# Patient Record
Sex: FEMALE | Race: WHITE | NOT HISPANIC OR LATINO | Employment: OTHER | URBAN - METROPOLITAN AREA
[De-identification: names, ages, dates, MRNs, and addresses within clinical notes are randomized per-mention and may not be internally consistent; named-entity substitution may affect disease eponyms.]

---

## 2017-04-04 DIAGNOSIS — Z76.0 ENCOUNTER FOR MEDICATION REFILL: ICD-10-CM

## 2017-04-04 RX ORDER — GABAPENTIN 300 MG/1
CAPSULE ORAL
Qty: 90 CAPSULE | Refills: 0 | Status: SHIPPED | OUTPATIENT
Start: 2017-04-04 | End: 2017-06-26

## 2017-05-01 DIAGNOSIS — Z76.0 ENCOUNTER FOR MEDICATION REFILL: ICD-10-CM

## 2017-05-01 RX ORDER — DILTIAZEM HYDROCHLORIDE 240 MG/1
CAPSULE, EXTENDED RELEASE ORAL
Qty: 90 CAPSULE | Refills: 0 | Status: SHIPPED | OUTPATIENT
Start: 2017-05-01 | End: 2022-07-29

## 2017-05-31 ENCOUNTER — TRANSFERRED RECORDS (OUTPATIENT)
Dept: FAMILY MEDICINE | Facility: CLINIC | Age: 42
End: 2017-05-31

## 2017-06-26 DIAGNOSIS — Z76.0 ENCOUNTER FOR MEDICATION REFILL: ICD-10-CM

## 2017-06-26 RX ORDER — TRAMADOL HYDROCHLORIDE 50 MG/1
TABLET ORAL
Qty: 40 TABLET | Refills: 0 | Status: SHIPPED | OUTPATIENT
Start: 2017-06-26 | End: 2022-07-29

## 2017-06-26 RX ORDER — GABAPENTIN 300 MG/1
CAPSULE ORAL
Qty: 90 CAPSULE | Refills: 0 | Status: SHIPPED | OUTPATIENT
Start: 2017-06-26 | End: 2022-07-29

## 2017-06-26 RX ORDER — ZOLPIDEM TARTRATE 5 MG/1
TABLET ORAL
Qty: 30 TABLET | Refills: 0 | Status: SHIPPED | OUTPATIENT
Start: 2017-06-26 | End: 2022-08-02

## 2017-11-09 ENCOUNTER — TRANSFERRED RECORDS (OUTPATIENT)
Dept: FAMILY MEDICINE | Facility: CLINIC | Age: 42
End: 2017-11-09

## 2018-07-01 ENCOUNTER — HEALTH MAINTENANCE LETTER (OUTPATIENT)
Age: 43
End: 2018-07-01

## 2019-07-25 ENCOUNTER — TRANSFERRED RECORDS (OUTPATIENT)
Dept: MULTI SPECIALTY CLINIC | Facility: CLINIC | Age: 44
End: 2019-07-25

## 2019-07-25 LAB
HPV ABSTRACT: NORMAL
PAP-ABSTRACT: NORMAL

## 2019-09-17 ENCOUNTER — RECORDS - HEALTHEAST (OUTPATIENT)
Dept: ADMINISTRATIVE | Facility: OTHER | Age: 44
End: 2019-09-17

## 2019-09-19 ENCOUNTER — RECORDS - HEALTHEAST (OUTPATIENT)
Dept: ADMINISTRATIVE | Facility: OTHER | Age: 44
End: 2019-09-19

## 2019-09-24 ENCOUNTER — RECORDS - HEALTHEAST (OUTPATIENT)
Dept: ADMINISTRATIVE | Facility: OTHER | Age: 44
End: 2019-09-24

## 2019-09-26 ASSESSMENT — MIFFLIN-ST. JEOR: SCORE: 2176.82

## 2019-09-27 ASSESSMENT — MIFFLIN-ST. JEOR: SCORE: 2176.66

## 2019-10-03 ENCOUNTER — ANESTHESIA - HEALTHEAST (OUTPATIENT)
Dept: SURGERY | Facility: CLINIC | Age: 44
End: 2019-10-03

## 2019-10-03 ENCOUNTER — SURGERY - HEALTHEAST (OUTPATIENT)
Dept: SURGERY | Facility: CLINIC | Age: 44
End: 2019-10-03

## 2019-10-03 ASSESSMENT — MIFFLIN-ST. JEOR: SCORE: 2167.87

## 2019-11-03 ENCOUNTER — HEALTH MAINTENANCE LETTER (OUTPATIENT)
Age: 44
End: 2019-11-03

## 2019-11-12 ENCOUNTER — RECORDS - HEALTHEAST (OUTPATIENT)
Dept: ADMINISTRATIVE | Facility: OTHER | Age: 44
End: 2019-11-12

## 2019-12-02 ASSESSMENT — MIFFLIN-ST. JEOR: SCORE: 2172.69

## 2019-12-05 ENCOUNTER — SURGERY - HEALTHEAST (OUTPATIENT)
Dept: SURGERY | Facility: CLINIC | Age: 44
End: 2019-12-05

## 2019-12-05 ENCOUNTER — ANESTHESIA - HEALTHEAST (OUTPATIENT)
Dept: SURGERY | Facility: CLINIC | Age: 44
End: 2019-12-05

## 2019-12-05 ASSESSMENT — MIFFLIN-ST. JEOR: SCORE: 2156.36

## 2019-12-20 ENCOUNTER — RECORDS - HEALTHEAST (OUTPATIENT)
Dept: ADMINISTRATIVE | Facility: OTHER | Age: 44
End: 2019-12-20

## 2019-12-23 ENCOUNTER — RECORDS - HEALTHEAST (OUTPATIENT)
Dept: ADMINISTRATIVE | Facility: OTHER | Age: 44
End: 2019-12-23

## 2020-02-10 ENCOUNTER — HEALTH MAINTENANCE LETTER (OUTPATIENT)
Age: 45
End: 2020-02-10

## 2020-11-16 ENCOUNTER — HEALTH MAINTENANCE LETTER (OUTPATIENT)
Age: 45
End: 2020-11-16

## 2021-06-02 NOTE — ANESTHESIA PROCEDURE NOTES
Peripheral Block    Patient location during procedure: pre-op  Start time: 10/3/2019 3:00 PM  End time: 10/3/2019 3:22 PM  post-op analgesia per surgeon order as noted in medical record  Staffing:  Performing  Anesthesiologist: Arleth Lizarraga MD  Preanesthetic Checklist  Completed: patient identified, site marked, risks, benefits, and alternatives discussed, timeout performed, consent obtained, airway assessed, oxygen available, suction available, emergency drugs available and hand hygiene performed  Peripheral Block  Block type: sciatic, popliteal  Prep: ChloraPrep  Patient position: supine  Patient monitoring: cardiac monitor, continuous pulse oximetry, heart rate and blood pressure  Laterality: left  Injection technique: ultrasound guided    Ultrasound used to visualize needle placement in proximity to nerve being blocked: yes   Permanent ultrasound image captured for medical record  Sterile gel and probe cover used for ultrasound.    Needle  Needle type: Stimuplex   Needle gauge: 22 G  Needle length: 6 in  no peripheral nerve catheter placed  Assessment  Injection assessment: no difficulty with injection, negative aspiration for heme and incremental injection

## 2021-06-02 NOTE — ANESTHESIA PREPROCEDURE EVALUATION
Anesthesia Evaluation      Patient summary reviewed   No history of anesthetic complications     Airway   Mallampati: II   Pulmonary - normal exam   (+) sleep apnea,                          Cardiovascular   Exercise tolerance: > or = 4 METS  Rhythm: regular  Rate: normal,         Neuro/Psych    (+) neuromuscular disease,      Endo/Other    (+) hypothyroidism, obesity,      GI/Hepatic/Renal            Dental                         Anesthesia Plan  Planned anesthetic: general endotracheal and peripheral nerve block    ASA 3   Induction: intravenous   Anesthetic plan and risks discussed with: patient    Post-op plan: routine recovery

## 2021-06-02 NOTE — ANESTHESIA POSTPROCEDURE EVALUATION
Patient: Estephania Wong  LEFT ANKLE ACHILLES TENDON DEBRIDEMENT INSERTIONAL ACHILLES TENDINOPATHY DEBRIDEMENT YULIA EXCISION GASTROC RECESSION, LEFT ANKLE HARDWARE REMOVAL  Anesthesia type: general    Patient location: Phase II Recovery  Last vitals:   Vitals Value Taken Time   /64 10/3/2019  7:30 PM   Temp 36.5  C (97.7  F) 10/3/2019  5:36 PM   Pulse 100 10/3/2019  7:33 PM   Resp 16 10/3/2019  6:25 PM   SpO2 100 % 10/3/2019  7:33 PM   Vitals shown include unvalidated device data.  Post vital signs: stable  Level of consciousness: awake and responds to simple questions  Post-anesthesia pain: pain controlled  Post-anesthesia nausea and vomiting: no  Pulmonary: unassisted, return to baseline  Cardiovascular: stable and blood pressure at baseline  Hydration: adequate  Anesthetic events: no    QCDR Measures:  ASA# 11 - Esha-op Cardiac Arrest: ASA11B - Patient did NOT experience unanticipated cardiac arrest  ASA# 12 - Esha-op Mortality Rate: ASA12B - Patient did NOT die  ASA# 13 - PACU Re-Intubation Rate: ASA13B - Patient did NOT require a new airway mgmt  ASA# 10 - Composite Anes Safety: ASA10A - No serious adverse event    Additional Notes: doing well after additional treatment for nausea; no pain due to good block

## 2021-06-02 NOTE — ANESTHESIA POSTPROCEDURE EVALUATION
Patient: Estephania Wong  LEFT ANKLE ACHILLES TENDON DEBRIDEMENT INSERTIONAL ACHILLES TENDINOPATHY DEBRIDEMENT YULIA EXCISION GASTROC RECESSION, LEFT ANKLE HARDWARE REMOVAL  Anesthesia type: general    Patient location: PACU  Last vitals:   Vitals Value Taken Time   /67 10/3/2019  6:10 PM   Temp 36.5  C (97.7  F) 10/3/2019  5:36 PM   Pulse 86 10/3/2019  6:14 PM   Resp 20 10/3/2019  6:14 PM   SpO2 99 % 10/3/2019  6:14 PM   Vitals shown include unvalidated device data.  Post vital signs: stable  Level of consciousness: awake and responds to simple questions  Post-anesthesia pain: pain controlled  Post-anesthesia nausea and vomiting: no  Pulmonary: unassisted, return to baseline  Cardiovascular: stable and blood pressure at baseline  Hydration: adequate  Anesthetic events: no    QCDR Measures:  ASA# 11 - Esha-op Cardiac Arrest: ASA11B - Patient did NOT experience unanticipated cardiac arrest  ASA# 12 - Esha-op Mortality Rate: ASA12B - Patient did NOT die  ASA# 13 - PACU Re-Intubation Rate: NA - No ETT / LMA used for case  ASA# 10 - Composite Anes Safety: ASA10A - No serious adverse event    Additional Notes:  CPAP overnight

## 2021-06-02 NOTE — ANESTHESIA PROCEDURE NOTES
Peripheral Block    Patient location during procedure: pre-op  Start time: 10/3/2019 3:22 PM  End time: 10/3/2019 3:26 PM  post-op analgesia per surgeon order as noted in medical record  Staffing:  Performing  Anesthesiologist: Arleth Lizarraga MD  Preanesthetic Checklist  Completed: patient identified, site marked, risks, benefits, and alternatives discussed, timeout performed, consent obtained, airway assessed, oxygen available, suction available, emergency drugs available and hand hygiene performed  Peripheral Block  Block type: saphenous, adductor canal block  Prep: ChloraPrep  Patient position: supine  Patient monitoring: cardiac monitor, continuous pulse oximetry, heart rate and blood pressure  Laterality: left  Injection technique: ultrasound guided    Ultrasound used to visualize needle placement in proximity to nerve being blocked: yes   Permanent ultrasound image captured for medical record  Sterile gel and probe cover used for ultrasound.    Needle  Needle type: Stimuplex   Needle gauge: 20G  Needle length: 6 in  no peripheral nerve catheter placed  Assessment  Injection assessment: no difficulty with injection, negative aspiration for heme, no paresthesia on injection and incremental injection

## 2021-06-02 NOTE — ANESTHESIA CARE TRANSFER NOTE
Last vitals:   Vitals:    10/03/19 1736   BP: (!) 144/95   Pulse: (!) 112   Resp: 20   Temp: 36.5  C (97.7  F)   SpO2: 100%     Patient's level of consciousness is awake  Spontaneous respirations: yes  Maintains airway independently: yes  Dentition unchanged: yes  Oropharynx: oropharynx clear of all foreign objects    QCDR Measures:  ASA# 20 - Surgical Safety Checklist: WHO surgical safety checklist completed prior to induction    PQRS# 430 - Adult PONV Prevention: 4558F - Pt received => 2 anti-emetic agents (different classes) preop & intraop  ASA# 8 - Peds PONV Prevention: NA - Not pediatric patient, not GA or 2 or more risk factors NOT present  PQRS# 424 - Esha-op Temp Management: 4559F - At least one body temp DOCUMENTED => 35.5C or 95.9F within required timeframe  PQRS# 426 - PACU Transfer Protocol: - Transfer of care checklist used  ASA# 14 - Acute Post-op Pain: ASA14B - Patient did NOT experience pain >= 7 out of 10

## 2021-06-03 VITALS — HEIGHT: 66 IN | BODY MASS INDEX: 47.09 KG/M2 | WEIGHT: 293 LBS

## 2021-06-03 VITALS — BODY MASS INDEX: 47.09 KG/M2 | WEIGHT: 293 LBS | HEIGHT: 66 IN

## 2021-06-04 NOTE — ANESTHESIA CARE TRANSFER NOTE
Last vitals:   Vitals:    12/05/19 1701   BP: 145/74   Pulse: (!) 116   Resp: 12   Temp: 36  C (96.8  F)   SpO2: 100%     Patient's level of consciousness is drowsy  Spontaneous respirations: yes  Maintains airway independently: yes  Dentition unchanged: yes  Oropharynx: oropharynx clear of all foreign objects    QCDR Measures:  ASA# 20 - Surgical Safety Checklist: WHO surgical safety checklist completed prior to induction    PQRS# 430 - Adult PONV Prevention: 4558F - Pt received => 2 anti-emetic agents (different classes) preop & intraop  ASA# 8 - Peds PONV Prevention: NA - Not pediatric patient, not GA or 2 or more risk factors NOT present  PQRS# 424 - Esha-op Temp Management: 4559F - At least one body temp DOCUMENTED => 35.5C or 95.9F within required timeframe  PQRS# 426 - PACU Transfer Protocol: - Transfer of care checklist used  ASA# 14 - Acute Post-op Pain: ASA14B - Patient did NOT experience pain >= 7 out of 10

## 2021-06-04 NOTE — ANESTHESIA POSTPROCEDURE EVALUATION
Patient: Estephania Wong  RIGHT HAGLUNDS RESECTION,ACHILLES REPAIR,, GASTROCNEMIUS RECESSIN  Anesthesia type: general    Patient location: PACU  Last vitals:   Vitals Value Taken Time   /57 12/5/2019  5:30 PM   Temp 36.1  C (96.9  F) 12/5/2019  5:20 PM   Pulse 60 12/5/2019  5:32 PM   Resp 13 12/5/2019  5:32 PM   SpO2 95 % 12/5/2019  5:32 PM   Vitals shown include unvalidated device data.  Post vital signs: stable  Level of consciousness: awake and responds to simple questions  Post-anesthesia pain: pain controlled  Post-anesthesia nausea and vomiting: no  Pulmonary: unassisted, return to baseline  Cardiovascular: stable and blood pressure at baseline  Hydration: adequate  Anesthetic events: no    QCDR Measures:  ASA# 11 - Esha-op Cardiac Arrest: ASA11B - Patient did NOT experience unanticipated cardiac arrest  ASA# 12 - Esha-op Mortality Rate: ASA12B - Patient did NOT die  ASA# 13 - PACU Re-Intubation Rate: ASA13B - Patient did NOT require a new airway mgmt  ASA# 10 - Composite Anes Safety: ASA10A - No serious adverse event    Additional Notes:

## 2021-06-04 NOTE — ANESTHESIA PREPROCEDURE EVALUATION
Anesthesia Evaluation      Patient summary reviewed   No history of anesthetic complications     Airway   Mallampati: II   Pulmonary - normal exam   (+) sleep apnea,                          Cardiovascular   Exercise tolerance: > or = 4 METS  Rhythm: regular  Rate: normal,         Neuro/Psych    (+) neuromuscular disease,      Endo/Other    (+) hypothyroidism, obesity,      GI/Hepatic/Renal            Dental                           Anesthesia Plan  Planned anesthetic: general endotracheal and peripheral nerve block  popliteal block for POP per surgeon request.  ASA 3   Induction: intravenous   Anesthetic plan and risks discussed with: patient    Post-op plan: routine recovery

## 2021-06-04 NOTE — ANESTHESIA PROCEDURE NOTES
Peripheral Block    Patient location during procedure: pre-op  Start time: 12/5/2019 2:41 PM  End time: 12/5/2019 2:58 PM  post-op analgesia per surgeon order as noted in medical record  Staffing:  Performing  Anesthesiologist: Arleth Lizarraga MD  Preanesthetic Checklist  Completed: patient identified, site marked, risks, benefits, and alternatives discussed, timeout performed, consent obtained, at patient's request, airway assessed, oxygen available, suction available, emergency drugs available and hand hygiene performed  Peripheral Block  Block type: sciatic, popliteal  Prep: ChloraPrep  Patient position: supine  Patient monitoring: cardiac monitor, continuous pulse oximetry, heart rate and blood pressure  Laterality: right  Injection technique: ultrasound guided    Ultrasound used to visualize needle placement in proximity to nerve being blocked: yes   US used to visualize anesthetic spread  Visualized anatomic structures normal  No Pathological Findings  Permanent ultrasound image captured for medical record  Sterile gel and probe cover used for ultrasound.  Needle  Needle type: Stimuplex   Needle gauge: 20G  Needle length: 6 in  no peripheral nerve catheter placed  Assessment  Injection assessment: no difficulty with injection, negative aspiration for heme, no paresthesia on injection and incremental injection  Additional Notes  Difficult to visualize popliteal nerve, technically difficult block.

## 2021-09-18 ENCOUNTER — HEALTH MAINTENANCE LETTER (OUTPATIENT)
Age: 46
End: 2021-09-18

## 2022-01-08 ENCOUNTER — HEALTH MAINTENANCE LETTER (OUTPATIENT)
Age: 47
End: 2022-01-08

## 2022-05-13 ENCOUNTER — TRANSFERRED RECORDS (OUTPATIENT)
Dept: HEALTH INFORMATION MANAGEMENT | Facility: CLINIC | Age: 47
End: 2022-05-13
Payer: COMMERCIAL

## 2022-07-28 ASSESSMENT — ENCOUNTER SYMPTOMS
DIZZINESS: 0
EYE PAIN: 0
CHILLS: 0
FREQUENCY: 0
NAUSEA: 0
DYSURIA: 0
PALPITATIONS: 0
HEMATOCHEZIA: 0
HEARTBURN: 1
ABDOMINAL PAIN: 0
PARESTHESIAS: 0
SHORTNESS OF BREATH: 0
BREAST MASS: 0
ARTHRALGIAS: 1
COUGH: 0
JOINT SWELLING: 0
HEMATURIA: 0
WEAKNESS: 0
DIARRHEA: 0
NERVOUS/ANXIOUS: 1
FEVER: 0
CONSTIPATION: 0
MYALGIAS: 0
SORE THROAT: 0
HEADACHES: 1

## 2022-07-29 ENCOUNTER — OFFICE VISIT (OUTPATIENT)
Dept: FAMILY MEDICINE | Facility: CLINIC | Age: 47
End: 2022-07-29
Payer: COMMERCIAL

## 2022-07-29 ENCOUNTER — MYC MEDICAL ADVICE (OUTPATIENT)
Dept: FAMILY MEDICINE | Facility: CLINIC | Age: 47
End: 2022-07-29

## 2022-07-29 VITALS
HEIGHT: 66 IN | WEIGHT: 293 LBS | BODY MASS INDEX: 47.09 KG/M2 | HEART RATE: 60 BPM | SYSTOLIC BLOOD PRESSURE: 139 MMHG | DIASTOLIC BLOOD PRESSURE: 85 MMHG

## 2022-07-29 DIAGNOSIS — Z13.220 SCREENING FOR HYPERLIPIDEMIA: ICD-10-CM

## 2022-07-29 DIAGNOSIS — Z12.31 VISIT FOR SCREENING MAMMOGRAM: ICD-10-CM

## 2022-07-29 DIAGNOSIS — R79.89 ELEVATED FERRITIN: ICD-10-CM

## 2022-07-29 DIAGNOSIS — E03.8 SUBCLINICAL HYPOTHYROIDISM: ICD-10-CM

## 2022-07-29 DIAGNOSIS — G25.81 RESTLESS LEGS SYNDROME (RLS): ICD-10-CM

## 2022-07-29 DIAGNOSIS — G47.00 INSOMNIA, UNSPECIFIED TYPE: ICD-10-CM

## 2022-07-29 DIAGNOSIS — Z12.11 SCREEN FOR COLON CANCER: ICD-10-CM

## 2022-07-29 DIAGNOSIS — Z86.2 HISTORY OF ANEMIA: ICD-10-CM

## 2022-07-29 DIAGNOSIS — K76.9 LIVER LESION: ICD-10-CM

## 2022-07-29 DIAGNOSIS — Z76.0 ENCOUNTER FOR MEDICATION REFILL: ICD-10-CM

## 2022-07-29 DIAGNOSIS — E66.01 MORBID OBESITY (H): ICD-10-CM

## 2022-07-29 DIAGNOSIS — Z00.00 ENCOUNTER FOR MEDICAL EXAMINATION TO ESTABLISH CARE: ICD-10-CM

## 2022-07-29 DIAGNOSIS — Z00.00 ANNUAL PHYSICAL EXAM: Primary | ICD-10-CM

## 2022-07-29 DIAGNOSIS — Z79.899 CONTROLLED SUBSTANCE AGREEMENT SIGNED: ICD-10-CM

## 2022-07-29 DIAGNOSIS — G47.00 INSOMNIA, UNSPECIFIED TYPE: Primary | ICD-10-CM

## 2022-07-29 PROBLEM — M54.6 PAIN IN THORACIC SPINE: Status: ACTIVE | Noted: 2018-10-01

## 2022-07-29 PROBLEM — Z97.5 IUD (INTRAUTERINE DEVICE) IN PLACE: Status: ACTIVE | Noted: 2022-07-29

## 2022-07-29 PROBLEM — H02.33 REDUNDANT EYELID OF BOTH EYES: Status: ACTIVE | Noted: 2021-10-27

## 2022-07-29 PROBLEM — D64.9 ANEMIA: Status: ACTIVE | Noted: 2019-09-24

## 2022-07-29 PROBLEM — M25.569 KNEE PAIN: Status: ACTIVE | Noted: 2021-03-01

## 2022-07-29 PROBLEM — D64.9 ANEMIA: Status: RESOLVED | Noted: 2019-09-24 | Resolved: 2022-07-29

## 2022-07-29 PROBLEM — I10 HYPERTENSION: Status: ACTIVE | Noted: 2019-09-24

## 2022-07-29 PROBLEM — R51.9 HEADACHE, CHRONIC DAILY: Status: ACTIVE | Noted: 2019-09-17

## 2022-07-29 PROBLEM — M25.519 PAIN IN SHOULDER: Status: ACTIVE | Noted: 2021-10-01

## 2022-07-29 PROBLEM — G43.909 MIGRAINE HEADACHE: Status: ACTIVE | Noted: 2018-10-01

## 2022-07-29 PROBLEM — H02.36 REDUNDANT EYELID OF BOTH EYES: Status: ACTIVE | Noted: 2021-10-27

## 2022-07-29 PROBLEM — M79.2 NEUROPATHIC PAIN: Status: ACTIVE | Noted: 2019-11-12

## 2022-07-29 LAB
ALBUMIN SERPL BCG-MCNC: 4.2 G/DL (ref 3.5–5.2)
ALP SERPL-CCNC: 59 U/L (ref 35–104)
ALT SERPL W P-5'-P-CCNC: 23 U/L (ref 10–35)
ANION GAP SERPL CALCULATED.3IONS-SCNC: 14 MMOL/L (ref 7–15)
AST SERPL W P-5'-P-CCNC: 16 U/L (ref 10–35)
BILIRUB SERPL-MCNC: 0.4 MG/DL
BUN SERPL-MCNC: 18.7 MG/DL (ref 6–20)
CALCIUM SERPL-MCNC: 9.6 MG/DL (ref 8.6–10)
CHLORIDE SERPL-SCNC: 100 MMOL/L (ref 98–107)
CHOLEST SERPL-MCNC: 195 MG/DL
CREAT SERPL-MCNC: 0.85 MG/DL (ref 0.51–0.95)
DEPRECATED HCO3 PLAS-SCNC: 24 MMOL/L (ref 22–29)
ERYTHROCYTE [DISTWIDTH] IN BLOOD BY AUTOMATED COUNT: 15.7 % (ref 10–15)
FERRITIN SERPL-MCNC: 732 NG/ML (ref 6–175)
GFR SERPL CREATININE-BSD FRML MDRD: 86 ML/MIN/1.73M2
GLUCOSE SERPL-MCNC: 99 MG/DL (ref 70–99)
HBA1C MFR BLD: 6.2 % (ref 0–5.6)
HCT VFR BLD AUTO: 40.8 % (ref 35–47)
HDLC SERPL-MCNC: 46 MG/DL
HGB BLD-MCNC: 13.4 G/DL (ref 11.7–15.7)
LDLC SERPL CALC-MCNC: 119 MG/DL
MCH RBC QN AUTO: 30 PG (ref 26.5–33)
MCHC RBC AUTO-ENTMCNC: 32.8 G/DL (ref 31.5–36.5)
MCV RBC AUTO: 92 FL (ref 78–100)
NONHDLC SERPL-MCNC: 149 MG/DL
PLATELET # BLD AUTO: 329 10E3/UL (ref 150–450)
POTASSIUM SERPL-SCNC: 3.8 MMOL/L (ref 3.4–5.3)
PROT SERPL-MCNC: 7.3 G/DL (ref 6.4–8.3)
RBC # BLD AUTO: 4.46 10E6/UL (ref 3.8–5.2)
SODIUM SERPL-SCNC: 138 MMOL/L (ref 136–145)
T4 FREE SERPL-MCNC: 1.08 NG/DL (ref 0.9–1.7)
TRIGL SERPL-MCNC: 149 MG/DL
TSH SERPL DL<=0.005 MIU/L-ACNC: 4.23 UIU/ML (ref 0.3–4.2)
WBC # BLD AUTO: 12 10E3/UL (ref 4–11)

## 2022-07-29 PROCEDURE — 80053 COMPREHEN METABOLIC PANEL: CPT | Performed by: FAMILY MEDICINE

## 2022-07-29 PROCEDURE — 99214 OFFICE O/P EST MOD 30 MIN: CPT | Mod: 25 | Performed by: FAMILY MEDICINE

## 2022-07-29 PROCEDURE — 83550 IRON BINDING TEST: CPT | Performed by: FAMILY MEDICINE

## 2022-07-29 PROCEDURE — 84443 ASSAY THYROID STIM HORMONE: CPT | Performed by: FAMILY MEDICINE

## 2022-07-29 PROCEDURE — 82728 ASSAY OF FERRITIN: CPT | Performed by: FAMILY MEDICINE

## 2022-07-29 PROCEDURE — 36415 COLL VENOUS BLD VENIPUNCTURE: CPT | Performed by: FAMILY MEDICINE

## 2022-07-29 PROCEDURE — 80061 LIPID PANEL: CPT | Performed by: FAMILY MEDICINE

## 2022-07-29 PROCEDURE — 99386 PREV VISIT NEW AGE 40-64: CPT | Performed by: FAMILY MEDICINE

## 2022-07-29 PROCEDURE — 83540 ASSAY OF IRON: CPT | Performed by: FAMILY MEDICINE

## 2022-07-29 PROCEDURE — 85027 COMPLETE CBC AUTOMATED: CPT | Performed by: FAMILY MEDICINE

## 2022-07-29 PROCEDURE — 83036 HEMOGLOBIN GLYCOSYLATED A1C: CPT | Performed by: FAMILY MEDICINE

## 2022-07-29 PROCEDURE — 84439 ASSAY OF FREE THYROXINE: CPT | Performed by: FAMILY MEDICINE

## 2022-07-29 RX ORDER — CLONAZEPAM 1 MG/1
1 TABLET ORAL AT BEDTIME
Qty: 30 TABLET | Refills: 0 | Status: SHIPPED | OUTPATIENT
Start: 2022-07-29 | End: 2022-08-29

## 2022-07-29 RX ORDER — METHOCARBAMOL 500 MG/1
TABLET, FILM COATED ORAL
COMMUNITY
Start: 2022-07-26 | End: 2022-08-24

## 2022-07-29 RX ORDER — HYDROCHLOROTHIAZIDE 50 MG/1
50 TABLET ORAL DAILY
COMMUNITY
Start: 2022-06-10 | End: 2023-03-24

## 2022-07-29 RX ORDER — CELECOXIB 100 MG/1
100 CAPSULE ORAL 2 TIMES DAILY
Status: ON HOLD | COMMUNITY
Start: 2022-06-24 | End: 2022-10-06 | Stop reason: SINTOL

## 2022-07-29 RX ORDER — BUPROPION HYDROCHLORIDE 150 MG/1
150 TABLET ORAL EVERY MORNING
Qty: 90 TABLET | Refills: 3 | Status: SHIPPED | OUTPATIENT
Start: 2022-07-29 | End: 2023-07-10

## 2022-07-29 RX ORDER — PREGABALIN 200 MG/1
200 CAPSULE ORAL AT BEDTIME
COMMUNITY
Start: 2022-07-20

## 2022-07-29 RX ORDER — NORTRIPTYLINE HCL 10 MG
20 CAPSULE ORAL AT BEDTIME
COMMUNITY
Start: 2022-07-08

## 2022-07-29 RX ORDER — MULTIPLE VITAMINS W/ MINERALS TAB 9MG-400MCG
1 TAB ORAL DAILY
COMMUNITY

## 2022-07-29 RX ORDER — FLUTICASONE PROPIONATE 50 MCG
2 SPRAY, SUSPENSION (ML) NASAL DAILY PRN
COMMUNITY
Start: 2014-10-01

## 2022-07-29 RX ORDER — DULOXETIN HYDROCHLORIDE 20 MG/1
CAPSULE, DELAYED RELEASE ORAL
COMMUNITY
Start: 2020-07-01 | End: 2022-09-21

## 2022-07-29 RX ORDER — RIZATRIPTAN BENZOATE 10 MG/1
10 TABLET, ORALLY DISINTEGRATING ORAL
COMMUNITY
Start: 2022-03-20

## 2022-07-29 RX ORDER — POTASSIUM CHLORIDE 1500 MG/1
20 TABLET, EXTENDED RELEASE ORAL DAILY
COMMUNITY
Start: 2022-07-10 | End: 2022-12-26

## 2022-07-29 RX ORDER — PREGABALIN 100 MG/1
100 CAPSULE ORAL 2 TIMES DAILY
COMMUNITY
Start: 2022-07-20 | End: 2024-03-05 | Stop reason: ALTCHOICE

## 2022-07-29 RX ORDER — CLONAZEPAM 1 MG/1
TABLET ORAL
COMMUNITY
Start: 2022-06-30 | End: 2022-07-29

## 2022-07-29 RX ORDER — MAGNESIUM OXIDE 240 MG
POWDER IN PACKET (EA) ORAL
Status: ON HOLD | COMMUNITY
Start: 2021-09-14 | End: 2022-10-06

## 2022-07-29 RX ORDER — TIZANIDINE 2 MG/1
2 TABLET ORAL AT BEDTIME
COMMUNITY
Start: 2022-07-18 | End: 2023-11-29

## 2022-07-29 RX ORDER — VENLAFAXINE 25 MG/1
25 TABLET ORAL
COMMUNITY
Start: 2022-07-18 | End: 2024-03-05 | Stop reason: DRUGHIGH

## 2022-07-29 RX ORDER — ROPINIROLE 0.25 MG/1
0.25 TABLET, FILM COATED ORAL 2 TIMES DAILY
COMMUNITY
Start: 2022-07-18 | End: 2023-01-24

## 2022-07-29 RX ORDER — PROPRANOLOL HYDROCHLORIDE 80 MG/1
CAPSULE, EXTENDED RELEASE ORAL
COMMUNITY
Start: 2022-05-10 | End: 2022-08-15

## 2022-07-29 ASSESSMENT — ENCOUNTER SYMPTOMS
HEADACHES: 1
COUGH: 0
ARTHRALGIAS: 1
DIARRHEA: 0
ABDOMINAL PAIN: 0
JOINT SWELLING: 0
DYSURIA: 0
NERVOUS/ANXIOUS: 1
FREQUENCY: 0
HEMATURIA: 0
WEAKNESS: 0
SORE THROAT: 0
CONSTIPATION: 0
PALPITATIONS: 0
SHORTNESS OF BREATH: 0
HEMATOCHEZIA: 0
FEVER: 0
DIZZINESS: 0
NAUSEA: 0
CHILLS: 0
EYE PAIN: 0
MYALGIAS: 0
PARESTHESIAS: 0
BREAST MASS: 0
HEARTBURN: 1

## 2022-07-29 ASSESSMENT — ANXIETY QUESTIONNAIRES
2. NOT BEING ABLE TO STOP OR CONTROL WORRYING: NOT AT ALL
IF YOU CHECKED OFF ANY PROBLEMS ON THIS QUESTIONNAIRE, HOW DIFFICULT HAVE THESE PROBLEMS MADE IT FOR YOU TO DO YOUR WORK, TAKE CARE OF THINGS AT HOME, OR GET ALONG WITH OTHER PEOPLE: SOMEWHAT DIFFICULT
GAD7 TOTAL SCORE: 3
5. BEING SO RESTLESS THAT IT IS HARD TO SIT STILL: SEVERAL DAYS
3. WORRYING TOO MUCH ABOUT DIFFERENT THINGS: NOT AT ALL
GAD7 TOTAL SCORE: 3
6. BECOMING EASILY ANNOYED OR IRRITABLE: NOT AT ALL
7. FEELING AFRAID AS IF SOMETHING AWFUL MIGHT HAPPEN: NOT AT ALL
1. FEELING NERVOUS, ANXIOUS, OR ON EDGE: SEVERAL DAYS

## 2022-07-29 ASSESSMENT — PATIENT HEALTH QUESTIONNAIRE - PHQ9
SUM OF ALL RESPONSES TO PHQ QUESTIONS 1-9: 6
5. POOR APPETITE OR OVEREATING: SEVERAL DAYS

## 2022-07-29 NOTE — PROGRESS NOTES
SUBJECTIVE:   CC: Estephania Wong is an 46 year old woman who presents for preventive health visit.     Chief Complaint   Patient presents with     Physical     Fasting for labs.         Patient has been advised of split billing requirements and indicates understanding: Yes  Healthy Habits:     Getting at least 3 servings of Calcium per day:  NO    Bi-annual eye exam:  NO    Dental care twice a year:  NO    Sleep apnea or symptoms of sleep apnea:  Sleep apnea    Diet:  Regular (no restrictions)    Frequency of exercise:  2-3 days/week    Duration of exercise:  15-30 minutes    Medication side effects:  Not applicable    PHQ-2 Total Score: 2    Additional concerns today:  Yes    Previously seeing provider at [facility].     August 2003 - pulmonary embolism - broken ankle, sprained other ankle, immobilized, on OCPs.   Homocysteine level checked after this.    Liver lesion - 2018, hadn't been feeling well, went to ED, CT scan showed liver spot. Has been monitoring annually and stable.     C7 stenosis, worsening headaches. Following with [facility], s/p injection, took edge off pain.    Specialists  [facility] - neurology and sleep apnea  [facility] - Dr. Hart - spine, Dr. Magdaleno - shoulder and knee , Dr. Ford - laminectomy   [facility] - [facility], certifying for cannabis   Hematology - [facility]       Taking over prescriptions -   Celebrex  Clonazepam  fluticasone  hydrochlorothiazide  Methocarbamol   Potassium  Propranolol - initially for migraines  Requip  Zanaflex  Ambien       Ambien - has been taking for at least 10 years, started with [facility].   Restless leg symptoms were originally treated with gabapentin, wasn't working as well. Tried to add on clonazepam. RLS had came back, restarted with rheumatology, was working well, trying to titrate up. [facility] recommended pain management therapist. No longer seeing rheum, not underlying rheum problem.     Less sedated with  methocarbamol. Tizanidine at bedtime, sedating.    WEIGHT MANAGEMENT: Started effexor this spring, up 20 lbs without other changes. Has worked with dieticians in the past. Was considering bariatric surgery in the past. Insurance won't cover. Mobility is challenging with back pain. [place], has animals, likes to walk around and stay busy.     Today's PHQ-2 Score:   PHQ-2 ( 1999 [employer]) 7/28/2022   Q1: Little interest or pleasure in doing things 1   Q2: Feeling down, depressed or hopeless 1   PHQ-2 Score 2   Q1: Little interest or pleasure in doing things Several days   Q2: Feeling down, depressed or hopeless Several days   PHQ-2 Score 2     PHQ9: 6/27  GAD7: 3/21    Abuse: Current or Past (Physical, Sexual or Emotional) - No  Do you feel safe in your environment? Yes    Have you ever done Advance Care Planning? (For example, a Health Directive, POLST, or a discussion with a medical provider or your loved ones about your wishes): Yes, patient states has an Advance Care Planning document and will bring a copy to the clinic.    Social History     Tobacco Use     Smoking status: Never Smoker     Smokeless tobacco: Never Used   Substance Use Topics     Alcohol use: Never     Alcohol/week: 0.0 standard drinks     If you drink alcohol do you typically have >3 drinks per day or >7 drinks per week? No    Alcohol Use 7/28/2022   Prescreen: >3 drinks/day or >7 drinks/week? Not Applicable   Prescreen: >3 drinks/day or >7 drinks/week? -       Reviewed orders with patient.  Reviewed health maintenance and updated orders accordingly - Yes      Breast Cancer Screening:    Breast CA Risk Assessment (FHS-7) 7/28/2022   Do you have a family history of breast, colon, or ovarian cancer? No / Unknown     Mammogram Screening: Recommended annual mammography  Pertinent mammograms are reviewed under the imaging tab.    History of abnormal Pap smear: NO - age 30-65 PAP every 5 years with negative HPV co-testing recommended    "  Reviewed and updated as needed this visit by clinical staff   Tobacco  Allergies  Meds                Reviewed and updated as needed this visit by Provider   Tobacco  Allergies  Meds  Problems  Med Hx  Surg Hx  Fam Hx             Review of Systems   Constitutional: Negative for chills and fever.   HENT: Negative for congestion, ear pain, hearing loss and sore throat.    Eyes: Negative for pain and visual disturbance.   Respiratory: Negative for cough and shortness of breath.    Cardiovascular: Positive for peripheral edema. Negative for chest pain and palpitations.   Gastrointestinal: Positive for heartburn. Negative for abdominal pain, constipation, diarrhea, hematochezia and nausea.   Breasts:  Negative for tenderness, breast mass and discharge.   Genitourinary: Negative for dysuria, frequency, genital sores, hematuria, pelvic pain, urgency, vaginal bleeding and vaginal discharge.   Musculoskeletal: Positive for arthralgias. Negative for joint swelling and myalgias.   Skin: Negative for rash.   Neurological: Positive for headaches. Negative for dizziness, weakness and paresthesias.   Psychiatric/Behavioral: Negative for mood changes. The patient is nervous/anxious.      Symptoms are all at baseline and adequately controlled.      OBJECTIVE:   /85 (BP Location: Left arm, Patient Position: Sitting, Cuff Size: Adult Regular)   Pulse 60   Ht 1.683 m (5' 6.25\")   Wt (!) 169.2 kg (373 lb)   LMP  (LMP Unknown)   BMI 59.75 kg/m    Physical Exam  GENERAL: healthy, morbidly obese, alert and no distress  EYES: Eyes grossly normal to inspection, PERRL and conjunctivae and sclerae normal  HENT: ear canals and TM's normal, nose and mouth without ulcers or lesions  NECK: no adenopathy, no asymmetry, masses, or scars and thyroid normal to palpation  RESP: lungs clear to auscultation - no rales, rhonchi or wheezes  BREAST: declined  CV: regular rate and rhythm, normal S1 S2, no S3 or S4, no murmur, click or " rub, no peripheral edema and peripheral pulses strong  ABDOMEN: soft, nontender, no hepatosplenomegaly, no masses and bowel sounds normal  MS: no gross musculoskeletal defects noted, no edema  SKIN: no suspicious lesions or rashes  NEURO: Normal strength and tone, mentation intact and speech normal  PSYCH: mentation appears normal, affect normal/bright      ASSESSMENT/PLAN:     1. Annual physical exam  2. Encounter for medical examination to establish care  - REVIEW OF HEALTH MAINTENANCE PROTOCOL ORDERS    Reviewed health history and health maintenance recommendations.    3. Morbid obesity (H)  - Comprehensive Weight Management; Future  - buPROPion (WELLBUTRIN XL) 150 MG 24 hr tablet; Take 1 tablet (150 mg) by mouth every morning  Dispense: 90 tablet; Refill: 3  - Lipid panel reflex to direct LDL Fasting  - Comprehensive metabolic panel (BMP + Alb, Alk Phos, ALT, AST, Total. Bili, TP)  - Hemoglobin A1c  - TSH with free T4 reflex    BMI is 59.  Not interested in surgical weight loss.  Recommend trial of Wellbutrin as this might also help other comorbidities.  We will titrate up as tolerated.  She is open to meeting with comprehensive weight management team.  Screen for hypothyroidism or other metabolic abnormalities contributing to or side effect of morbid obesity.    4. Controlled substance agreement signed - 7/29/22  5. Insomnia, unspecified type  6. Restless legs syndrome (RLS)  - clonazePAM (KLONOPIN) 1 MG tablet; Take 1 tablet (1 mg) by mouth At Bedtime  Dispense: 30 tablet; Refill: 0    Controlled substance agreement reviewed and signed today.  Reviewed [place] .  I will take over prescribing her clonazepam and her Ambien as she has been on this combination for a long time.  I did discuss my concerns for respiratory suppression combining different sedating medications and we will continue to monitor.    7. Subclinical hypothyroidism  - TSH with free T4 reflex    Surveillance labs.  Has not been treated  "with levothyroxine.    8. History of anemia  - CBC with platelets  - Ferritin    Trending labs today.  Following with [facility] for hematology evaluation.  Wondering about possible referral to the you?  Await lab results.    9. Screening for hyperlipidemia  - Lipid panel reflex to direct LDL Fasting    Screening labs.    10. Visit for screening mammogram  - MA SCREENING DIGITAL BILAT - Future  (s+30); Future    11. Screen for colon cancer  - COLOGUARD([employer])     Patient has been advised of split billing requirements and indicates understanding: Yes    COUNSELING:  Reviewed preventive health counseling, as reflected in patient instructions    Estimated body mass index is 59.75 kg/m  as calculated from the following:    Height as of this encounter: 1.683 m (5' 6.25\").    Weight as of this encounter: 169.2 kg (373 lb).    Weight management plan: Patient referred to endocrine and/or weight management specialty    She reports that she has never smoked. She has never used smokeless tobacco.      Counseling Resources:  ATP IV Guidelines  Pooled Cohorts Equation Calculator  Breast Cancer Risk Calculator  BRCA-Related Cancer Risk Assessment: FHS-7 Tool  FRAX Risk Assessment  ICSI Preventive Guidelines  Dietary Guidelines for Americans, 2010  USDA's MyPlate  ASA Prophylaxis  Lung CA Screening    Damaris Son, DO  [facility]  "

## 2022-07-29 NOTE — LETTER
[facility]  07/29/22  Patient: Estephania Wong  YOB: 1975  Medical Record Number: [id number]                                                                                  Non-Opioid Controlled Substance Agreement    This is an agreement between you and your provider regarding safe and appropriate use of controlled substances prescribed by your care team. Controlled substances are?medicines that can cause physical and mental dependence (abuse).     There are strict laws about having and using these medicines. We here at [facility] are  committed to working with you in your efforts to get better. To support you in this work, we'll help you schedule regular office appointments for medicine refills. If we must cancel or change your appointment for any reason, we'll make sure you have enough medicine to last until your next appointment.     As a Provider, I will:     Listen carefully to your concerns while treating you with respect.     Recommend a treatment plan that I believe is in your best interest and may involve therapies other than medicine.      Talk with you often about the possible benefits and the risk of harm of any medicine that we prescribe for you.    Assess the safety of this medicine and check how well it works.      Provide a plan on how to taper (discontinue or go off) using this medicine if the decision is made to stop its use.      ::  As a Patient, I understand controlled substances:       Are prescribed by my care provider to help me function or work and manage my condition(s).?    Are strong medicines and can cause serious side effects.       Need to be taken exactly as prescribed.?Combining controlled substances with certain medicines or chemicals (such as illegal drugs, alcohol, sedatives, sleeping pills, and benzodiazepines) can be dangerous or even fatal.? If I stop taking my medicines suddenly, I may have severe withdrawal symptoms.      The risks, benefits, and side effects of these medicine(s) were explained to me. I agree that:    1. I will take part in other treatments as advised by my care team. This may be psychiatry or counseling, physical therapy, behavioral therapy, group treatment or a referral to specialist.    2. I will keep all my appointments and understand this is part of the monitoring of controlled substances.?My care team may require an office visit for EVERY controlled substance refill. If I miss appointments or don t follow instructions, my care team may stop my medicine    3. I will take my medicines as prescribed. I will not change the dose or schedule unless my care team tells me to. There will be no refills if I run out early.      4. I may be asked to come to the clinic and complete a urine drug test or complete a pill count. If I don t give a urine sample or participate in a pill count, the care team may stop my medicine.    5. I will only receive controlled substance prescriptions from this clinic. If I am treated by another provider, I will tell them that I am taking controlled substances and that I have a treatment agreement with this provider. I will inform my [facility] care team within one business day if I am given a prescription for any controlled substance by another healthcare provider. My [facility] care team can contact other providers and pharmacists about my use of any medicines.    6. It is up to me to make sure that I don't run out of my medicines on weekends or holidays.?If my care team is willing to refill my prescription without a visit, I must request refills only during office hours. Refills may take up to 3 business days to process. I will use one pharmacy to fill all my controlled substance prescriptions. I will notify the clinic about any changes to my insurance or medicine availability.    7. I am responsible for my prescriptions. If the medicine/prescription is lost, stolen or  destroyed, it will not be replaced.?I also agree not to share controlled substance medicines with anyone.     8. I am aware I should not use any illegal or recreational drugs. I agree not to drink alcohol unless my care team says I can.     9. If I enroll in the [place] Medical Cannabis program, I will tell my care team before my next refill.    10. I will tell my care team right away if I become pregnant, have a new medical problem treated outside of my regular clinic, or have a change in my medicines.     11. I understand that this medicine can affect my thinking, judgment and reaction time.? Alcohol and drugs affect the brain and body, which can affect the safety of my driving. Being under the influence of alcohol or drugs can affect my decision-making, behaviors, personal safety and the safety of others. Driving while impaired (DWI) can occur if a person is driving, operating or in physical control of a car, motorcycle, boat, snowmobile, ATV, motorbike, off-road vehicle or any other motor vehicle ([place] Statute 169A.20). I understand the risk if I choose to drive or operate any vehicle or machinery.    I understand that if I do not follow any of the conditions above, my prescriptions or treatment may be stopped or changed.   I agree that my provider, clinic care team and pharmacy may work with any city, state or federal law enforcement agency that investigates the misuse, sale or other diversion of my controlled medicine. I will allow my provider to discuss my care with, or share a copy of, this agreement with any other treating provider, pharmacy or emergency room where I receive care.     I have read this agreement and have asked questions about anything I did not understand.    ________________________________________________________  Patient Signature - Estephania Wong     ___________________                   Date     ________________________________________________________  Provider Signature - Damaris VENEGAS  Son, DO       ___________________                   Date     ________________________________________________________  Witness Signature (required if provider not present while patient signing)          ___________________                   Date

## 2022-07-30 LAB
IRON BINDING CAPACITY (ROCHE): 334 UG/DL (ref 240–430)
IRON SATN MFR SERPL: 16 % (ref 15–46)
IRON SERPL-MCNC: 52 UG/DL (ref 37–145)

## 2022-07-30 NOTE — RESULT ENCOUNTER NOTE
"Patient updated by [employer] message with lab results.   The 10-year ASCVD risk score ([place] HARPER Jr., et al., 2013) is: 1.6%      Dawit Frafan,  Your labs have returned.   Ferritin still elevated, hemoglobin normalized, I am adding on iron binding studies to help clarify.   TSH mildly elevated, free T4 normal. This is \"subclinical hypothyroidism\" and we wll continue to monitor.   Cholesterol labs look okay. Continue working on healthy lifestyle habits to help boost your HDL (good cholesterol) and manage LDL (bad cholesterol).  A1c is 6.2, in prediabetes range.   Everything else looks good.  Please reach out by Cam[employer]Cam with questions or concerns.  Damaris Son, DO   "

## 2022-07-31 NOTE — RESULT ENCOUNTER NOTE
"Patient updated by [employer] message with iron studies results.      Iron studies show a lower iron saturation index. This means you do not have \"iron overload\" despite the elevated ferritin.   Damaris Son, DO "

## 2022-08-01 ENCOUNTER — TRANSFERRED RECORDS (OUTPATIENT)
Dept: HEALTH INFORMATION MANAGEMENT | Facility: CLINIC | Age: 47
End: 2022-08-01

## 2022-08-02 RX ORDER — ZOLPIDEM TARTRATE 5 MG/1
TABLET ORAL
Qty: 30 TABLET | Refills: 0 | Status: SHIPPED | OUTPATIENT
Start: 2022-08-02 | End: 2022-08-29

## 2022-08-02 NOTE — TELEPHONE ENCOUNTER
Please see [employer] message. Pharmacy and medication pended.     Jeni sOcar RN

## 2022-08-02 NOTE — TELEPHONE ENCOUNTER
1. Encounter for medication refill  2. Insomnia, unspecified type  - zolpidem (AMBIEN) 5 MG tablet; TAKE 1 TABLET BY MOUTH EVERY DAY AT BEDTIME AS NEEDED FOR SLEEP  Dispense: 30 tablet; Refill: 0    Sign CSA at appointment.  [facility] reviewed.  Is due for refill.  Refill sent to pharmacy.    3. Liver lesion  - MR Liver wo & w Contrast; Future     Proceed with MRI of liver to further evaluate liver lesion so we can have a definitive plan for follow-up in place.    Damaris Son, DO

## 2022-08-09 ENCOUNTER — TRANSFERRED RECORDS (OUTPATIENT)
Dept: HEALTH INFORMATION MANAGEMENT | Facility: CLINIC | Age: 47
End: 2022-08-09

## 2022-08-09 ENCOUNTER — ANCILLARY PROCEDURE (OUTPATIENT)
Dept: MAMMOGRAPHY | Facility: HOSPITAL | Age: 47
End: 2022-08-09
Attending: FAMILY MEDICINE
Payer: COMMERCIAL

## 2022-08-09 DIAGNOSIS — Z12.31 VISIT FOR SCREENING MAMMOGRAM: ICD-10-CM

## 2022-08-09 PROCEDURE — 77067 SCR MAMMO BI INCL CAD: CPT

## 2022-08-15 ENCOUNTER — MYC MEDICAL ADVICE (OUTPATIENT)
Dept: FAMILY MEDICINE | Facility: CLINIC | Age: 47
End: 2022-08-15

## 2022-08-15 DIAGNOSIS — F40.232 FEAR OF OTHER MEDICAL CARE: ICD-10-CM

## 2022-08-15 DIAGNOSIS — G43.909 MIGRAINE WITHOUT STATUS MIGRAINOSUS, NOT INTRACTABLE, UNSPECIFIED MIGRAINE TYPE: Primary | ICD-10-CM

## 2022-08-15 DIAGNOSIS — F41.9 ANXIETY DUE TO INVASIVE PROCEDURE: ICD-10-CM

## 2022-08-16 RX ORDER — PROPRANOLOL HYDROCHLORIDE 80 MG/1
80 CAPSULE, EXTENDED RELEASE ORAL DAILY
Qty: 90 CAPSULE | Refills: 3 | Status: SHIPPED | OUTPATIENT
Start: 2022-08-16 | End: 2023-09-08

## 2022-08-16 RX ORDER — DIAZEPAM 10 MG
10 TABLET ORAL
Qty: 1 TABLET | Refills: 0 | Status: SHIPPED | OUTPATIENT
Start: 2022-08-16 | End: 2022-09-21

## 2022-08-16 NOTE — TELEPHONE ENCOUNTER
3. Fear of other medical care  - diazepam (VALIUM) 10 MG tablet; Take 1 tablet (10 mg) by mouth once as needed for anxiety (procedural sedation)  Dispense: 1 tablet; Refill: 0     Procedural sedation sent to pharmacy.    Damaris Son DO

## 2022-08-16 NOTE — TELEPHONE ENCOUNTER
1. Migraine without status migrainosus, not intractable, unspecified migraine type  - propranolol ER (INDERAL LA) 80 MG 24 hr capsule; Take 1 capsule (80 mg) by mouth daily  Dispense: 90 capsule; Refill: 3     Damaris Son DO

## 2022-08-23 ENCOUNTER — TRANSFERRED RECORDS (OUTPATIENT)
Dept: HEALTH INFORMATION MANAGEMENT | Facility: CLINIC | Age: 47
End: 2022-08-23

## 2022-09-14 ENCOUNTER — HOSPITAL ENCOUNTER (OUTPATIENT)
Dept: MRI IMAGING | Facility: HOSPITAL | Age: 47
Discharge: HOME | End: 2022-09-14
Attending: FAMILY MEDICINE | Admitting: FAMILY MEDICINE
Payer: COMMERCIAL

## 2022-09-14 DIAGNOSIS — K76.9 LIVER LESION: ICD-10-CM

## 2022-09-14 PROCEDURE — A9581 GADOXETATE DISODIUM INJ: HCPCS | Performed by: FAMILY MEDICINE

## 2022-09-14 PROCEDURE — 255N000002 HC RX 255 OP 636: Performed by: FAMILY MEDICINE

## 2022-09-14 PROCEDURE — 74183 MRI ABD W/O CNTR FLWD CNTR: CPT

## 2022-09-14 RX ADMIN — GADOXETATE DISODIUM 10 ML: 181.43 INJECTION, SOLUTION INTRAVENOUS at 16:23

## 2022-09-15 NOTE — RESULT ENCOUNTER NOTE
Patient updated by [employer] message with MRI results.       Liver spot appears benign and unchanged. We do not need to continue monitoring this.  Damaris Son, DO

## 2022-09-20 ENCOUNTER — TRANSFERRED RECORDS (OUTPATIENT)
Dept: HEALTH INFORMATION MANAGEMENT | Facility: CLINIC | Age: 47
End: 2022-09-20

## 2022-09-21 ENCOUNTER — OFFICE VISIT (OUTPATIENT)
Dept: FAMILY MEDICINE | Facility: CLINIC | Age: 47
End: 2022-09-21
Payer: COMMERCIAL

## 2022-09-21 VITALS
DIASTOLIC BLOOD PRESSURE: 84 MMHG | HEIGHT: 66 IN | BODY MASS INDEX: 47.09 KG/M2 | HEART RATE: 74 BPM | SYSTOLIC BLOOD PRESSURE: 128 MMHG | WEIGHT: 293 LBS

## 2022-09-21 DIAGNOSIS — M25.511 CHRONIC RIGHT SHOULDER PAIN: ICD-10-CM

## 2022-09-21 DIAGNOSIS — G89.29 CHRONIC RIGHT SHOULDER PAIN: ICD-10-CM

## 2022-09-21 DIAGNOSIS — Z23 IMMUNIZATION DUE: ICD-10-CM

## 2022-09-21 DIAGNOSIS — Z01.818 PREOP GENERAL PHYSICAL EXAM: Primary | ICD-10-CM

## 2022-09-21 LAB
ALBUMIN SERPL BCG-MCNC: 4.1 G/DL (ref 3.5–5.2)
ALP SERPL-CCNC: 62 U/L (ref 35–104)
ALT SERPL W P-5'-P-CCNC: 27 U/L (ref 10–35)
ANION GAP SERPL CALCULATED.3IONS-SCNC: 10 MMOL/L (ref 7–15)
AST SERPL W P-5'-P-CCNC: 24 U/L (ref 10–35)
BILIRUB SERPL-MCNC: 0.5 MG/DL
BUN SERPL-MCNC: 14.7 MG/DL (ref 6–20)
CALCIUM SERPL-MCNC: 9.3 MG/DL (ref 8.6–10)
CHLORIDE SERPL-SCNC: 100 MMOL/L (ref 98–107)
CREAT SERPL-MCNC: 0.9 MG/DL (ref 0.51–0.95)
DEPRECATED HCO3 PLAS-SCNC: 27 MMOL/L (ref 22–29)
ERYTHROCYTE [DISTWIDTH] IN BLOOD BY AUTOMATED COUNT: 16.2 % (ref 10–15)
GFR SERPL CREATININE-BSD FRML MDRD: 80 ML/MIN/1.73M2
GLUCOSE SERPL-MCNC: 116 MG/DL (ref 70–99)
HCG SERPL QL: NEGATIVE
HCT VFR BLD AUTO: 39.8 % (ref 35–47)
HGB BLD-MCNC: 13 G/DL (ref 11.7–15.7)
MCH RBC QN AUTO: 30 PG (ref 26.5–33)
MCHC RBC AUTO-ENTMCNC: 32.7 G/DL (ref 31.5–36.5)
MCV RBC AUTO: 92 FL (ref 78–100)
PLATELET # BLD AUTO: 305 10E3/UL (ref 150–450)
POTASSIUM SERPL-SCNC: 3.7 MMOL/L (ref 3.4–5.3)
PROT SERPL-MCNC: 7.2 G/DL (ref 6.4–8.3)
RBC # BLD AUTO: 4.34 10E6/UL (ref 3.8–5.2)
SODIUM SERPL-SCNC: 137 MMOL/L (ref 136–145)
WBC # BLD AUTO: 8.2 10E3/UL (ref 4–11)

## 2022-09-21 PROCEDURE — 36415 COLL VENOUS BLD VENIPUNCTURE: CPT | Performed by: FAMILY MEDICINE

## 2022-09-21 PROCEDURE — 91312 COVID-19,PF,PFIZER BOOSTER BIVALENT: CPT | Performed by: FAMILY MEDICINE

## 2022-09-21 PROCEDURE — 93005 ELECTROCARDIOGRAM TRACING: CPT | Performed by: FAMILY MEDICINE

## 2022-09-21 PROCEDURE — 93010 ELECTROCARDIOGRAM REPORT: CPT | Performed by: INTERNAL MEDICINE

## 2022-09-21 PROCEDURE — 0124A COVID-19,PF,PFIZER BOOSTER BIVALENT: CPT | Performed by: FAMILY MEDICINE

## 2022-09-21 PROCEDURE — 99214 OFFICE O/P EST MOD 30 MIN: CPT | Mod: 25 | Performed by: FAMILY MEDICINE

## 2022-09-21 PROCEDURE — 90686 IIV4 VACC NO PRSV 0.5 ML IM: CPT | Performed by: FAMILY MEDICINE

## 2022-09-21 PROCEDURE — 90471 IMMUNIZATION ADMIN: CPT | Performed by: FAMILY MEDICINE

## 2022-09-21 PROCEDURE — 85027 COMPLETE CBC AUTOMATED: CPT | Performed by: FAMILY MEDICINE

## 2022-09-21 PROCEDURE — 80053 COMPREHEN METABOLIC PANEL: CPT | Performed by: FAMILY MEDICINE

## 2022-09-21 NOTE — PATIENT INSTRUCTIONS
Preparing for Your Surgery  Getting started  A nurse will call you to review your health history and instructions. They will give you an arrival time based on your scheduled surgery time. Please be ready to share:    Your doctor's clinic name and phone number    Your medical, surgical and anesthesia history    A list of allergies and sensitivities    A list of medicines, including herbal treatments and over-the-counter drugs    Whether the patient has a legal guardian (ask how to send us the papers in advance)  Please tell us if you're pregnant--or if there's any chance you might be pregnant. Some surgeries may injure a fetus (unborn baby), so they require a pregnancy test. Surgeries that are safe for a fetus don't always need a test, and you can choose whether to have one.   If you have a child who's having surgery, please ask for a copy of Preparing for Your Child's Surgery.    Preparing for surgery    Within 30 days of surgery: Have a pre-op exam (sometimes called an H&P, or History and Physical). This can be done at a clinic or pre-operative center.  ? If you're having a , you may not need this exam. Talk to your care team.    At your pre-op exam, talk to your care team about all medicines you take. If you need to stop any medicines before surgery, ask when to start taking them again.  ? We do this for your safety. Many medicines can make you bleed too much during surgery. Some change how well surgery (anesthesia) drugs work.    Call your insurance company to let them know you're having surgery. (If you don't have insurance, call [phone].)    Call your clinic if there's any change in your health. This includes signs of a cold or flu (sore throat, runny nose, cough, rash, fever). It also includes a scrape or scratch near the surgery site.    If you have questions on the day of surgery, call your hospital or surgery center.  COVID testing  You may need to be tested for COVID-19 before having  surgery. If so, we will give you instructions.  Eating and drinking guidelines  For your safety: Unless your surgeon tells you otherwise, follow the guidelines below.    Eat and drink as usual until 8 hours before surgery. After that, no food or milk.    Drink clear liquids until 2 hours before surgery. These are liquids you can see through, like water, Gatorade and Propel Water. You may also have black coffee and tea (no cream or milk).    Nothing by mouth within 2 hours of surgery. This includes gum, candy and breath mints.    If you drink alcohol: Stop drinking it the night before surgery.    If your care team tells you to take medicine on the morning of surgery, it's okay to take it with a sip of water.  Preventing infection    Shower or bathe the night before and morning of your surgery. Follow the instructions your clinic gave you. (If no instructions, use regular soap.)    Don't shave or clip hair near your surgery site. We'll remove the hair if needed.    Don't smoke or vape the morning of surgery. You may chew nicotine gum up to 2 hours before surgery. A nicotine patch is okay.  ? Note: Some surgeries require you to completely quit smoking and nicotine. Check with your surgeon.    Your care team will make every effort to keep you safe from infection. We will:  ? Clean our hands often with soap and water (or an alcohol-based hand rub).  ? Clean the skin at your surgery site with a special soap that kills germs.  ? Give you a special gown to keep you warm. (Cold raises the risk of infection.)  ? Wear special hair covers, masks, gowns and gloves during surgery.  ? Give antibiotic medicine, if prescribed. Not all surgeries need antibiotics.  What to bring on the day of surgery    Photo ID and insurance card    Copy of your health care directive, if you have one    Glasses and hearing aides (bring cases)  ? You can't wear contacts during surgery    Inhaler and eye drops, if you use them (tell us about these when  you arrive)    CPAP machine or breathing device, if you use them    A few personal items, if spending the night    If you have . . .  ? A pacemaker, ICD (cardiac defibrillator) or other implant: Bring the ID card.  ? An implanted stimulator: Bring the remote control.  ? A legal guardian: Bring a copy of the certified (court-stamped) guardianship papers.  Please remove any jewelry, including body piercings. Leave jewelry and other valuables at home.  If you're going home the day of surgery    You must have a responsible adult drive you home. They should stay with you overnight as well.    If you don't have someone to stay with you, and you aren't safe to go home alone, we may keep you overnight. Insurance often won't pay for this.  After surgery  If it's hard to control your pain or you need more pain medicine, please call your surgeon's office.  Questions?   If you have any questions for your care team, list them here: _________________________________________________________________________________________________________________________________________________________________________ ____________________________________ ____________________________________ ____________________________________  For informational purposes only. Not to replace the advice of your health care provider. Copyright   2003, 2019 [facility]. All rights reserved. Clinically reviewed by Ro Wooten MD. [employer] [id number] - REV 07/21.    How to Take Your Medication Before Surgery  - Take all of your medications before surgery except as noted below  - HOLD (do not take) Celebrex for Ibuprofen for 7 days before surgery

## 2022-09-21 NOTE — PROGRESS NOTES
[facility]  [address] [facility] [address]  Phone: [phone]  Fax: [phone]  Primary Provider: System, Provider Not In  Pre-op Performing Provider: AKBAR HUNTLEY    PREOPERATIVE EVALUATION:  Today's date: 9/21/2022    Estephania Wong is a 46 year old female who presents for a preoperative evaluation.    Surgical Information:  Surgery/Procedure: RIGHT SHOULDER ARTHROSCOPY, ACROMIOPLASTY, BICEPS TENOTOMY  Surgery Location: [facility]  Surgeon: Dr. Magdaleno  Surgery Date: 10/6/22  Time of Surgery: TBD  Where patient plans to recover: At home with family  Fax number for surgical facility: [phone]    Type of Anesthesia Anticipated: General with Block    Assessment & Plan     The proposed surgical procedure is considered INTERMEDIATE risk.    1. Preop general physical exam  2. Chronic right shoulder pain  - Comprehensive metabolic panel (BMP + Alb, Alk Phos, ALT, AST, Total. Bili, TP); Future  - CBC with platelets; Future  - EKG 12-lead, tracing only    Risks and Recommendations:  The patient has the following additional risks and recommendations for perioperative complications:   - Morbid obesity (BMI >40)   - Obstructive sleep apnea   - Hx provoked PE, encourage ambulation post op, monitor for VTE symptoms    Medication Instructions:  Patient is to take all scheduled medications on the day of surgery EXCEPT for modifications listed below:   - celecoxib (Celebrex): HOLD 3 days before surgery. May continue without modification for management of severe pain.     RECOMMENDATION:  APPROVAL GIVEN to proceed with proposed procedure, without further diagnostic evaluation.    3. Immunization due  - INFLUENZA VACCINE IM > 6 MONTHS VALENT IIV4 (AFLURIA/FLUZONE)  - COVID-19,PF,PFIZER BOOSTER BIVALENT (12+YRS)         Subjective     HPI related to upcoming procedure:     Scheduled for right shoulder arthroscopy, acromioplasty, and biceps tenotomy on 10/6/22.     Covid: Has  PCR scheduled.      Preop Questions 9/20/2022   1. Have you ever had a heart attack or stroke? No   2. Have you ever had surgery on your heart or blood vessels, such as a stent placement, a coronary artery bypass, or surgery on an artery in your head, neck, heart, or legs? No   3. Do you have chest pain with activity? No   4. Do you have a history of  heart failure? No   5. Do you currently have a cold, bronchitis or symptoms of other infection? No   6. Do you have a cough, shortness of breath, or wheezing? No   7. Do you or anyone in your family have previous history of blood clots? YES - provoked, OCPs and immobility with ankle fracture and sprained ankle.   8. Do you or does anyone in your family have a serious bleeding problem such as prolonged bleeding following surgeries or cuts? No   9. Have you ever had problems with anemia or been told to take iron pills? YES - remote history, most recent hemoglobin normal. Has been treated with iron infusions for RLS.    10. Have you had any abnormal blood loss such as black, tarry or bloody stools, or abnormal vaginal bleeding? YES - abnormal uterine bleeding, on Mirena IUD.    11. Have you ever had a blood transfusion? YES - menorrhagia    11a. Have you ever had a transfusion reaction? No   12. Are you willing to have a blood transfusion if it is medically needed before, during, or after your surgery? Yes   13. Have you or any of your relatives ever had problems with anesthesia? No   14. Do you have sleep apnea, excessive snoring or daytime drowsiness? YES - has CPAP   14a. Do you have a CPAP machine? Yes   15. Do you have any artifical heart valves or other implanted medical devices like a pacemaker, defibrillator, or continuous glucose monitor? No   16. Do you have artificial joints? No   17. Are you allergic to latex? No   18. Is there any chance that you may be pregnant? No       Health Care Directive:  Patient does not have a Health Care Directive or Living Will:  Discussed advance care planning with patient; however, patient declined at this time.    Preoperative Review of :   reviewed - controlled substances reflected in medication list.      Status of Chronic Conditions:  See problem list for active medical problems.  Problems all longstanding and stable, except as noted/documented.  See ROS for pertinent symptoms related to these conditions.      Review of Systems  Constitutional, neuro, ENT, endocrine, pulmonary, cardiac, gastrointestinal, genitourinary, musculoskeletal, integument and psychiatric systems are negative, except as otherwise noted.    Patient Active Problem List    Diagnosis Date Noted     Mirena IUD (intrauterine device) in place - inserted July 2019 07/29/2022     Priority: Medium     Controlled substance agreement signed - 7/29/22 07/29/2022     Priority: Medium     Redundant eyelid of both eyes 10/27/2021     Priority: Medium     Pulmonary embolism (H) 10/27/2021     Priority: Medium     Formatting of this note might be different from the original.  Formatting of this note might be different from the original.  FROM OC 'S AND FRACTURE ANKLE  Formatting of this note might be different from the original.  FROM OC 'S AND FRACTURE ANKLE       Elevated homocysteine 10/27/2021     Priority: Medium     Pain in shoulder 10/01/2021     Priority: Medium     Knee pain 03/01/2021     Priority: Medium     Neuropathic pain 11/12/2019     Priority: Medium     Hypertension 09/24/2019     Priority: Medium     Headache, chronic daily 09/17/2019     Priority: Medium     Liver lesion 07/25/2019     Priority: Medium     6/28/19: Dense hepatic steatosis. 2 x 2 cm indeterminate mass. Recommend either multiphasic enhanced CT or MRI as an outpatient for further evaluation.    9/17/20: Mild hepatic steatosis has improved. Stable 2 cm enhancing mass in segment 2 of the liver.     8/24/21: Stable 2 cm enhancing lesion in segment 2 of the liver.          Pain in thoracic  spine 10/01/2018     Priority: Medium     Migraine headache 10/01/2018     Priority: Medium     Insomnia 08/01/2017     Priority: Medium     Family history of ischemic heart disease 12/01/2015     Priority: Medium     SOB (shortness of breath) 12/01/2015     Priority: Medium     Subclinical hypothyroidism 05/20/2015     Priority: Medium     Morbid obesity (H) 05/20/2015     Priority: Medium     Low back pain 05/20/2015     Priority: Medium     Diagnosis updated by automated process. Provider to review and confirm.       Restless legs syndrome (RLS) 05/20/2015     Priority: Medium     Vitamin D deficiency 05/20/2015     Priority: Medium     Mixed hyperlipidemia 05/20/2015     Priority: Medium     Father with CAD 55       GERD (gastroesophageal reflux disease) 03/13/2014     Priority: Medium     Obstructive sleep apnea 12/02/2011     Priority: Medium     Metabolic syndrome 10/03/2011     Priority: Medium     Drug resistance to insulin 10/03/2011     Priority: Medium     Fibromyalgia 07/01/2010     Priority: Medium      Past Medical History:   Diagnosis Date     Anemia 9/24/2019     Back pain oct 2013     Fracture of ankle, left, closed 2003    with plates & screws     Hypocalcemia 2/14/2016    Formatting of this note might be different from the original. Formatting of this note might be different from the original. Noted on February 13, 2016. Formatting of this note might be different from the original. Noted on February 13, 2016.     Iron deficiency anemia 5/20/2015     IUD contraception 2009     LBP (low back pain) 5/20/2015     Pneumonia 2009     Pulmonary embolism, bilateral (H) 2003     Sleep apnea      Past Surgical History:   Procedure Laterality Date     GALLBLADDER SURGERY  01/01/1999     HC GASTROCNEMIUS RECESSION Left 10/03/2019    Procedure: LEFT ANKLE ACHILLES TENDON DEBRIDEMENT INSERTIONAL ACHILLES TENDINOPATHY DEBRIDEMENT YULIA EXCISION GASTROC RECESSION;  Surgeon: Jace Rocha DO;  Location:  [facility];  Service: Orthopedics     HC GASTROCNEMIUS RECESSION Right 12/05/2019    Procedure: GASTROCNEMIUS RECESSIN;  Surgeon: Jace Rocha DO;  Location: [facility] OR;  Service: Orthopedics     LAMINECTOMY       REMOVE HARDWARE LOWER EXTREMITY Left 10/03/2019    Procedure: LEFT ANKLE HARDWARE REMOVAL;  Surgeon: Jace Rocha DO;  Location: [facility] OR;  Service: Orthopedics     REPAIR TENDON ACHILLES Right 12/05/2019    Procedure: RIGHT HAGLUNDS RESECTION,ACHILLES REPAIR,;  Surgeon: Jace Rocha DO;  Location: [facility] OR;  Service: Orthopedics     Current Outpatient Medications   Medication Sig Dispense Refill     buPROPion (WELLBUTRIN XL) 150 MG 24 hr tablet Take 1 tablet (150 mg) by mouth every morning 90 tablet 3     celecoxib (CELEBREX) 100 MG capsule        cholecalciferol 25 MCG (1000 UT) TABS Take 1,000 Units by mouth       clonazePAM (KLONOPIN) 1 MG tablet TAKE ONE TABLET BY MOUTH AT BEDTIME 30 tablet 0     fluticasone (FLONASE) 50 MCG/ACT nasal spray 2 sprays       hydrochlorothiazide (HYDRODIURIL) 50 MG tablet        ibuprofen (ADVIL/MOTRIN) 200 MG capsule Take 800 mg by mouth 2 times daily as needed for pain (back pain - 4-6 tabs/day)       levonorgestrel (MIRENA) 20 MCG/DAY IUD 1 each by Intrauterine route       Magnesium Oxide (MAGNESIUM OXIDE 400) 240 MG PACK        medical cannabis (Patient's own supply)        methocarbamol (ROBAXIN) 500 MG tablet Take 1 tablet (500 mg) by mouth daily as needed for muscle spasms 90 tablet 1     multivitamin w/minerals (THERA-VIT-M) tablet Take 1 tablet by mouth daily       nortriptyline (PAMELOR) 10 MG capsule        omeprazole (PRILOSEC) 20 MG DR capsule Take 20 mg by mouth       potassium chloride ER (KLOR-CON M) 20 MEQ CR tablet        pregabalin (LYRICA) 100 MG capsule        Pregabalin (LYRICA) 200 MG capsule        propranolol ER (INDERAL LA) 80 MG 24 hr capsule Take 1 capsule (80 mg) by mouth daily 90 capsule  "3     rizatriptan (MAXALT-MLT) 10 MG ODT        rOPINIRole (REQUIP) 0.25 MG tablet        tiZANidine (ZANAFLEX) 2 MG tablet        venlafaxine (EFFEXOR) 25 MG tablet        zolpidem (AMBIEN) 5 MG tablet TAKE 1 TABLET BY MOUTH EVERY DAY AT BEDTIME AS NEEDED FOR SLEEP 30 tablet 0     acetaminophen (TYLENOL) 500 MG tablet Take 2 tablets (1,000 mg) by mouth 2 times daily as needed for mild pain (Patient not taking: No sig reported) 100 tablet 0     diazepam (VALIUM) 10 MG tablet Take 1 tablet (10 mg) by mouth once as needed for anxiety (procedural sedation) 1 tablet 0     DULoxetine (CYMBALTA) 20 MG capsule  (Patient not taking: Reported on 9/21/2022)         Allergies   Allergen Reactions     Vancomycin Itching     Metformin Nausea     Sulfa Drugs Rash        Social History     Tobacco Use     Smoking status: Never Smoker     Smokeless tobacco: Never Used   Substance Use Topics     Alcohol use: Never     Family History   Problem Relation Age of Onset     Cancer Mother      Coronary Artery Disease Father 55     Venous thrombosis Sister      History   Drug Use Unknown         Objective     /84 (BP Location: Left arm, Patient Position: Sitting, Cuff Size: Adult Large)   Pulse 74   Ht 1.683 m (5' 6.25\")   Wt (!) 167.9 kg (370 lb 4 oz)   LMP  (LMP Unknown)   BMI 59.31 kg/m      Physical Exam    GENERAL APPEARANCE: healthy, obese, alert and no distress     EYES: EOMI, PERRL     HENT: ear canals and TM's normal and nose and mouth without ulcers or lesions     NECK: no adenopathy, no asymmetry, masses, or scars and thyroid normal to palpation     RESP: lungs clear to auscultation - no rales, rhonchi or wheezes     CV: regular rates and rhythm, normal S1 S2, no S3 or S4 and no murmur, click or rub     ABDOMEN:  soft, nontender, no HSM or masses and bowel sounds normal     MS: extremities normal- no gross deformities noted, no evidence of inflammation in joints, FROM in all extremities.     SKIN: no suspicious lesions " or rashes     NEURO: Normal strength and tone, sensory exam grossly normal, mentation intact and speech normal     PSYCH: mentation appears normal. and affect normal/bright     LYMPHATICS: No cervical adenopathy    Recent Labs   Lab Test 07/29/22  1635   HGB 13.4         POTASSIUM 3.8   CR 0.85   A1C 6.2*        Diagnostics:   Recent Results (from the past 240 hour(s))   EKG 12-lead, tracing only    Collection Time: 09/21/22  2:54 PM   Result Value Ref Range    Systolic Blood Pressure  mmHg    Diastolic Blood Pressure  mmHg    Ventricular Rate 67 BPM    Atrial Rate 67 BPM    MS Interval 168 ms    QRS Duration 88 ms     ms    QTc 443 ms    P Axis 44 degrees    R AXIS 7 degrees    T Axis 42 degrees    Interpretation ECG       Sinus rhythm  Normal ECG  When compared with ECG of 06-NOV-2018 03:29,  No significant change was found  Confirmed by SHARMIN GUILLEN MD LOC: ([id number]) on 9/23/2022 8:14:38 AM     Comprehensive metabolic panel (BMP + Alb, Alk Phos, ALT, AST, Total. Bili, TP)    Collection Time: 09/21/22  3:17 PM   Result Value Ref Range    Sodium 137 136 - 145 mmol/L    Potassium 3.7 3.4 - 5.3 mmol/L    Chloride 100 98 - 107 mmol/L    Carbon Dioxide (CO2) 27 22 - 29 mmol/L    Anion Gap 10 7 - 15 mmol/L    Urea Nitrogen 14.7 6.0 - 20.0 mg/dL    Creatinine 0.90 0.51 - 0.95 mg/dL    Calcium 9.3 8.6 - 10.0 mg/dL    Glucose 116 (H) 70 - 99 mg/dL    Alkaline Phosphatase 62 35 - 104 U/L    AST 24 10 - 35 U/L    ALT 27 10 - 35 U/L    Protein Total 7.2 6.4 - 8.3 g/dL    Albumin 4.1 3.5 - 5.2 g/dL    Bilirubin Total 0.5 <=1.2 mg/dL    GFR Estimate 80 >60 mL/min/1.73m2   CBC with platelets    Collection Time: 09/21/22  3:17 PM   Result Value Ref Range    WBC Count 8.2 4.0 - 11.0 10e3/uL    RBC Count 4.34 3.80 - 5.20 10e6/uL    Hemoglobin 13.0 11.7 - 15.7 g/dL    Hematocrit 39.8 35.0 - 47.0 %    MCV 92 78 - 100 fL    MCH 30.0 26.5 - 33.0 pg    MCHC 32.7 31.5 - 36.5 g/dL    RDW 16.2 (H) 10.0 - 15.0 %     Platelet Count 305 150 - 450 10e3/uL   HCG qualitative    Collection Time: 09/21/22  3:17 PM   Result Value Ref Range    hCG Serum Qualitative Negative Negative        EKG required for comorbidities and not completed in the last 90 days.     Revised Cardiac Risk Index (RCRI):  The patient has the following serious cardiovascular risks for perioperative complications:   - No serious cardiac risks = 0 points     RCRI Interpretation: 0 points: Class I (very low risk - 0.4% complication rate)           Signed Electronically by: Damaris Son DO  Copy of this evaluation report is provided to requesting physician.

## 2022-09-21 NOTE — H&P (VIEW-ONLY)
[facility]  [address] [facility] [address]  Phone: [phone]  Fax: [phone]  Primary Provider: System, Provider Not In  Pre-op Performing Provider: AKBAR HUNTLEY    PREOPERATIVE EVALUATION:  Today's date: 9/21/2022    Estephania Wong is a 46 year old female who presents for a preoperative evaluation.    Surgical Information:  Surgery/Procedure: RIGHT SHOULDER ARTHROSCOPY, ACROMIOPLASTY, BICEPS TENOTOMY  Surgery Location: [facility]  Surgeon: Dr. Magdaleno  Surgery Date: 10/6/22  Time of Surgery: TBD  Where patient plans to recover: At home with family  Fax number for surgical facility: [phone]    Type of Anesthesia Anticipated: General with Block    Assessment & Plan     The proposed surgical procedure is considered INTERMEDIATE risk.    1. Preop general physical exam  2. Chronic right shoulder pain  - Comprehensive metabolic panel (BMP + Alb, Alk Phos, ALT, AST, Total. Bili, TP); Future  - CBC with platelets; Future  - EKG 12-lead, tracing only    Risks and Recommendations:  The patient has the following additional risks and recommendations for perioperative complications:   - Morbid obesity (BMI >40)   - Obstructive sleep apnea   - Hx provoked PE, encourage ambulation post op, monitor for VTE symptoms    Medication Instructions:  Patient is to take all scheduled medications on the day of surgery EXCEPT for modifications listed below:   - celecoxib (Celebrex): HOLD 3 days before surgery. May continue without modification for management of severe pain.     RECOMMENDATION:  APPROVAL GIVEN to proceed with proposed procedure, without further diagnostic evaluation.    3. Immunization due  - INFLUENZA VACCINE IM > 6 MONTHS VALENT IIV4 (AFLURIA/FLUZONE)  - COVID-19,PF,PFIZER BOOSTER BIVALENT (12+YRS)         Subjective     HPI related to upcoming procedure:     Scheduled for right shoulder arthroscopy, acromioplasty, and biceps tenotomy on 10/6/22.     Covid: Has  PCR scheduled.      Preop Questions 9/20/2022   1. Have you ever had a heart attack or stroke? No   2. Have you ever had surgery on your heart or blood vessels, such as a stent placement, a coronary artery bypass, or surgery on an artery in your head, neck, heart, or legs? No   3. Do you have chest pain with activity? No   4. Do you have a history of  heart failure? No   5. Do you currently have a cold, bronchitis or symptoms of other infection? No   6. Do you have a cough, shortness of breath, or wheezing? No   7. Do you or anyone in your family have previous history of blood clots? YES - provoked, OCPs and immobility with ankle fracture and sprained ankle.   8. Do you or does anyone in your family have a serious bleeding problem such as prolonged bleeding following surgeries or cuts? No   9. Have you ever had problems with anemia or been told to take iron pills? YES - remote history, most recent hemoglobin normal. Has been treated with iron infusions for RLS.    10. Have you had any abnormal blood loss such as black, tarry or bloody stools, or abnormal vaginal bleeding? YES - abnormal uterine bleeding, on Mirena IUD.    11. Have you ever had a blood transfusion? YES - menorrhagia    11a. Have you ever had a transfusion reaction? No   12. Are you willing to have a blood transfusion if it is medically needed before, during, or after your surgery? Yes   13. Have you or any of your relatives ever had problems with anesthesia? No   14. Do you have sleep apnea, excessive snoring or daytime drowsiness? YES - has CPAP   14a. Do you have a CPAP machine? Yes   15. Do you have any artifical heart valves or other implanted medical devices like a pacemaker, defibrillator, or continuous glucose monitor? No   16. Do you have artificial joints? No   17. Are you allergic to latex? No   18. Is there any chance that you may be pregnant? No       Health Care Directive:  Patient does not have a Health Care Directive or Living Will:  Discussed advance care planning with patient; however, patient declined at this time.    Preoperative Review of :   reviewed - controlled substances reflected in medication list.      Status of Chronic Conditions:  See problem list for active medical problems.  Problems all longstanding and stable, except as noted/documented.  See ROS for pertinent symptoms related to these conditions.      Review of Systems  Constitutional, neuro, ENT, endocrine, pulmonary, cardiac, gastrointestinal, genitourinary, musculoskeletal, integument and psychiatric systems are negative, except as otherwise noted.    Patient Active Problem List    Diagnosis Date Noted     Mirena IUD (intrauterine device) in place - inserted July 2019 07/29/2022     Priority: Medium     Controlled substance agreement signed - 7/29/22 07/29/2022     Priority: Medium     Redundant eyelid of both eyes 10/27/2021     Priority: Medium     Pulmonary embolism (H) 10/27/2021     Priority: Medium     Formatting of this note might be different from the original.  Formatting of this note might be different from the original.  FROM OC 'S AND FRACTURE ANKLE  Formatting of this note might be different from the original.  FROM OC 'S AND FRACTURE ANKLE       Elevated homocysteine 10/27/2021     Priority: Medium     Pain in shoulder 10/01/2021     Priority: Medium     Knee pain 03/01/2021     Priority: Medium     Neuropathic pain 11/12/2019     Priority: Medium     Hypertension 09/24/2019     Priority: Medium     Headache, chronic daily 09/17/2019     Priority: Medium     Liver lesion 07/25/2019     Priority: Medium     6/28/19: Dense hepatic steatosis. 2 x 2 cm indeterminate mass. Recommend either multiphasic enhanced CT or MRI as an outpatient for further evaluation.    9/17/20: Mild hepatic steatosis has improved. Stable 2 cm enhancing mass in segment 2 of the liver.     8/24/21: Stable 2 cm enhancing lesion in segment 2 of the liver.          Pain in thoracic  spine 10/01/2018     Priority: Medium     Migraine headache 10/01/2018     Priority: Medium     Insomnia 08/01/2017     Priority: Medium     Family history of ischemic heart disease 12/01/2015     Priority: Medium     SOB (shortness of breath) 12/01/2015     Priority: Medium     Subclinical hypothyroidism 05/20/2015     Priority: Medium     Morbid obesity (H) 05/20/2015     Priority: Medium     Low back pain 05/20/2015     Priority: Medium     Diagnosis updated by automated process. Provider to review and confirm.       Restless legs syndrome (RLS) 05/20/2015     Priority: Medium     Vitamin D deficiency 05/20/2015     Priority: Medium     Mixed hyperlipidemia 05/20/2015     Priority: Medium     Father with CAD 55       GERD (gastroesophageal reflux disease) 03/13/2014     Priority: Medium     Obstructive sleep apnea 12/02/2011     Priority: Medium     Metabolic syndrome 10/03/2011     Priority: Medium     Drug resistance to insulin 10/03/2011     Priority: Medium     Fibromyalgia 07/01/2010     Priority: Medium      Past Medical History:   Diagnosis Date     Anemia 9/24/2019     Back pain oct 2013     Fracture of ankle, left, closed 2003    with plates & screws     Hypocalcemia 2/14/2016    Formatting of this note might be different from the original. Formatting of this note might be different from the original. Noted on February 13, 2016. Formatting of this note might be different from the original. Noted on February 13, 2016.     Iron deficiency anemia 5/20/2015     IUD contraception 2009     LBP (low back pain) 5/20/2015     Pneumonia 2009     Pulmonary embolism, bilateral (H) 2003     Sleep apnea      Past Surgical History:   Procedure Laterality Date     GALLBLADDER SURGERY  01/01/1999     HC GASTROCNEMIUS RECESSION Left 10/03/2019    Procedure: LEFT ANKLE ACHILLES TENDON DEBRIDEMENT INSERTIONAL ACHILLES TENDINOPATHY DEBRIDEMENT YULIA EXCISION GASTROC RECESSION;  Surgeon: Jace Rocha DO;  Location:  [facility];  Service: Orthopedics     HC GASTROCNEMIUS RECESSION Right 12/05/2019    Procedure: GASTROCNEMIUS RECESSIN;  Surgeon: Jace Rocha DO;  Location: [facility] OR;  Service: Orthopedics     LAMINECTOMY       REMOVE HARDWARE LOWER EXTREMITY Left 10/03/2019    Procedure: LEFT ANKLE HARDWARE REMOVAL;  Surgeon: Jace Rocha DO;  Location: [facility] OR;  Service: Orthopedics     REPAIR TENDON ACHILLES Right 12/05/2019    Procedure: RIGHT HAGLUNDS RESECTION,ACHILLES REPAIR,;  Surgeon: Jace Rocha DO;  Location: [facility] OR;  Service: Orthopedics     Current Outpatient Medications   Medication Sig Dispense Refill     buPROPion (WELLBUTRIN XL) 150 MG 24 hr tablet Take 1 tablet (150 mg) by mouth every morning 90 tablet 3     celecoxib (CELEBREX) 100 MG capsule        cholecalciferol 25 MCG (1000 UT) TABS Take 1,000 Units by mouth       clonazePAM (KLONOPIN) 1 MG tablet TAKE ONE TABLET BY MOUTH AT BEDTIME 30 tablet 0     fluticasone (FLONASE) 50 MCG/ACT nasal spray 2 sprays       hydrochlorothiazide (HYDRODIURIL) 50 MG tablet        ibuprofen (ADVIL/MOTRIN) 200 MG capsule Take 800 mg by mouth 2 times daily as needed for pain (back pain - 4-6 tabs/day)       levonorgestrel (MIRENA) 20 MCG/DAY IUD 1 each by Intrauterine route       Magnesium Oxide (MAGNESIUM OXIDE 400) 240 MG PACK        medical cannabis (Patient's own supply)        methocarbamol (ROBAXIN) 500 MG tablet Take 1 tablet (500 mg) by mouth daily as needed for muscle spasms 90 tablet 1     multivitamin w/minerals (THERA-VIT-M) tablet Take 1 tablet by mouth daily       nortriptyline (PAMELOR) 10 MG capsule        omeprazole (PRILOSEC) 20 MG DR capsule Take 20 mg by mouth       potassium chloride ER (KLOR-CON M) 20 MEQ CR tablet        pregabalin (LYRICA) 100 MG capsule        Pregabalin (LYRICA) 200 MG capsule        propranolol ER (INDERAL LA) 80 MG 24 hr capsule Take 1 capsule (80 mg) by mouth daily 90 capsule  "3     rizatriptan (MAXALT-MLT) 10 MG ODT        rOPINIRole (REQUIP) 0.25 MG tablet        tiZANidine (ZANAFLEX) 2 MG tablet        venlafaxine (EFFEXOR) 25 MG tablet        zolpidem (AMBIEN) 5 MG tablet TAKE 1 TABLET BY MOUTH EVERY DAY AT BEDTIME AS NEEDED FOR SLEEP 30 tablet 0     acetaminophen (TYLENOL) 500 MG tablet Take 2 tablets (1,000 mg) by mouth 2 times daily as needed for mild pain (Patient not taking: No sig reported) 100 tablet 0     diazepam (VALIUM) 10 MG tablet Take 1 tablet (10 mg) by mouth once as needed for anxiety (procedural sedation) 1 tablet 0     DULoxetine (CYMBALTA) 20 MG capsule  (Patient not taking: Reported on 9/21/2022)         Allergies   Allergen Reactions     Vancomycin Itching     Metformin Nausea     Sulfa Drugs Rash        Social History     Tobacco Use     Smoking status: Never Smoker     Smokeless tobacco: Never Used   Substance Use Topics     Alcohol use: Never     Family History   Problem Relation Age of Onset     Cancer Mother      Coronary Artery Disease Father 55     Venous thrombosis Sister      History   Drug Use Unknown         Objective     /84 (BP Location: Left arm, Patient Position: Sitting, Cuff Size: Adult Large)   Pulse 74   Ht 1.683 m (5' 6.25\")   Wt (!) 167.9 kg (370 lb 4 oz)   LMP  (LMP Unknown)   BMI 59.31 kg/m      Physical Exam    GENERAL APPEARANCE: healthy, obese, alert and no distress     EYES: EOMI, PERRL     HENT: ear canals and TM's normal and nose and mouth without ulcers or lesions     NECK: no adenopathy, no asymmetry, masses, or scars and thyroid normal to palpation     RESP: lungs clear to auscultation - no rales, rhonchi or wheezes     CV: regular rates and rhythm, normal S1 S2, no S3 or S4 and no murmur, click or rub     ABDOMEN:  soft, nontender, no HSM or masses and bowel sounds normal     MS: extremities normal- no gross deformities noted, no evidence of inflammation in joints, FROM in all extremities.     SKIN: no suspicious lesions " or rashes     NEURO: Normal strength and tone, sensory exam grossly normal, mentation intact and speech normal     PSYCH: mentation appears normal. and affect normal/bright     LYMPHATICS: No cervical adenopathy    Recent Labs   Lab Test 07/29/22  1635   HGB 13.4         POTASSIUM 3.8   CR 0.85   A1C 6.2*        Diagnostics:   Recent Results (from the past 240 hour(s))   EKG 12-lead, tracing only    Collection Time: 09/21/22  2:54 PM   Result Value Ref Range    Systolic Blood Pressure  mmHg    Diastolic Blood Pressure  mmHg    Ventricular Rate 67 BPM    Atrial Rate 67 BPM    WV Interval 168 ms    QRS Duration 88 ms     ms    QTc 443 ms    P Axis 44 degrees    R AXIS 7 degrees    T Axis 42 degrees    Interpretation ECG       Sinus rhythm  Normal ECG  When compared with ECG of 06-NOV-2018 03:29,  No significant change was found  Confirmed by SHARMIN GUILLEN MD LOC: ([id number]) on 9/23/2022 8:14:38 AM     Comprehensive metabolic panel (BMP + Alb, Alk Phos, ALT, AST, Total. Bili, TP)    Collection Time: 09/21/22  3:17 PM   Result Value Ref Range    Sodium 137 136 - 145 mmol/L    Potassium 3.7 3.4 - 5.3 mmol/L    Chloride 100 98 - 107 mmol/L    Carbon Dioxide (CO2) 27 22 - 29 mmol/L    Anion Gap 10 7 - 15 mmol/L    Urea Nitrogen 14.7 6.0 - 20.0 mg/dL    Creatinine 0.90 0.51 - 0.95 mg/dL    Calcium 9.3 8.6 - 10.0 mg/dL    Glucose 116 (H) 70 - 99 mg/dL    Alkaline Phosphatase 62 35 - 104 U/L    AST 24 10 - 35 U/L    ALT 27 10 - 35 U/L    Protein Total 7.2 6.4 - 8.3 g/dL    Albumin 4.1 3.5 - 5.2 g/dL    Bilirubin Total 0.5 <=1.2 mg/dL    GFR Estimate 80 >60 mL/min/1.73m2   CBC with platelets    Collection Time: 09/21/22  3:17 PM   Result Value Ref Range    WBC Count 8.2 4.0 - 11.0 10e3/uL    RBC Count 4.34 3.80 - 5.20 10e6/uL    Hemoglobin 13.0 11.7 - 15.7 g/dL    Hematocrit 39.8 35.0 - 47.0 %    MCV 92 78 - 100 fL    MCH 30.0 26.5 - 33.0 pg    MCHC 32.7 31.5 - 36.5 g/dL    RDW 16.2 (H) 10.0 - 15.0 %     Platelet Count 305 150 - 450 10e3/uL   HCG qualitative    Collection Time: 09/21/22  3:17 PM   Result Value Ref Range    hCG Serum Qualitative Negative Negative        EKG required for comorbidities and not completed in the last 90 days.     Revised Cardiac Risk Index (RCRI):  The patient has the following serious cardiovascular risks for perioperative complications:   - No serious cardiac risks = 0 points     RCRI Interpretation: 0 points: Class I (very low risk - 0.4% complication rate)           Signed Electronically by: Damaris Son DO  Copy of this evaluation report is provided to requesting physician.

## 2022-09-22 NOTE — RESULT ENCOUNTER NOTE
Patient updated by [employer] message with lab results.       Dawit Farfan,  Your labs have returned and are in the normal range.  Damaris Son, DO

## 2022-09-23 LAB
ATRIAL RATE - MUSE: 67 BPM
DIASTOLIC BLOOD PRESSURE - MUSE: NORMAL MMHG
INTERPRETATION ECG - MUSE: NORMAL
P AXIS - MUSE: 44 DEGREES
PR INTERVAL - MUSE: 168 MS
QRS DURATION - MUSE: 88 MS
QT - MUSE: 420 MS
QTC - MUSE: 443 MS
R AXIS - MUSE: 7 DEGREES
SYSTOLIC BLOOD PRESSURE - MUSE: NORMAL MMHG
T AXIS - MUSE: 42 DEGREES
VENTRICULAR RATE- MUSE: 67 BPM

## 2022-09-26 NOTE — RESULT ENCOUNTER NOTE
Patient updated by [employer] message with EKG results.      Dawit Farfan,  EKG reviewed by cardiology as well, normal, no significant change compared to previous.  Damaris Son, DO

## 2022-10-03 ENCOUNTER — LAB (OUTPATIENT)
Dept: LAB | Facility: CLINIC | Age: 47
End: 2022-10-03
Payer: COMMERCIAL

## 2022-10-03 DIAGNOSIS — Z01.818 PREOP GENERAL PHYSICAL EXAM: ICD-10-CM

## 2022-10-03 DIAGNOSIS — Z20.822 ENCOUNTER FOR LABORATORY TESTING FOR COVID-19 VIRUS: ICD-10-CM

## 2022-10-03 LAB — HCG UR QL: NEGATIVE

## 2022-10-03 PROCEDURE — 81025 URINE PREGNANCY TEST: CPT

## 2022-10-03 PROCEDURE — U0005 INFEC AGEN DETEC AMPLI PROBE: HCPCS

## 2022-10-03 PROCEDURE — U0003 INFECTIOUS AGENT DETECTION BY NUCLEIC ACID (DNA OR RNA); SEVERE ACUTE RESPIRATORY SYNDROME CORONAVIRUS 2 (SARS-COV-2) (CORONAVIRUS DISEASE [COVID-19]), AMPLIFIED PROBE TECHNIQUE, MAKING USE OF HIGH THROUGHPUT TECHNOLOGIES AS DESCRIBED BY CMS-2020-01-R: HCPCS

## 2022-10-04 LAB — SARS-COV-2 RNA RESP QL NAA+PROBE: NEGATIVE

## 2022-10-05 ENCOUNTER — ANESTHESIA EVENT (OUTPATIENT)
Dept: SURGERY | Facility: CLINIC | Age: 47
End: 2022-10-05
Payer: COMMERCIAL

## 2022-10-06 ENCOUNTER — ANESTHESIA (OUTPATIENT)
Dept: SURGERY | Facility: CLINIC | Age: 47
End: 2022-10-06
Payer: COMMERCIAL

## 2022-10-06 ENCOUNTER — HOSPITAL ENCOUNTER (OUTPATIENT)
Facility: CLINIC | Age: 47
Discharge: HOME | End: 2022-10-07
Attending: ORTHOPAEDIC SURGERY | Admitting: ORTHOPAEDIC SURGERY
Payer: COMMERCIAL

## 2022-10-06 DIAGNOSIS — G89.29 CHRONIC RIGHT SHOULDER PAIN: ICD-10-CM

## 2022-10-06 DIAGNOSIS — Z98.890 HISTORY OF ARTHROSCOPY OF RIGHT SHOULDER: Primary | ICD-10-CM

## 2022-10-06 DIAGNOSIS — M25.511 CHRONIC RIGHT SHOULDER PAIN: ICD-10-CM

## 2022-10-06 PROCEDURE — 258N000003 HC RX IP 258 OP 636: Performed by: ANESTHESIOLOGY

## 2022-10-06 PROCEDURE — 272N000001 HC OR GENERAL SUPPLY STERILE: Performed by: ORTHOPAEDIC SURGERY

## 2022-10-06 PROCEDURE — 250N000011 HC RX IP 250 OP 636: Performed by: PHYSICIAN ASSISTANT

## 2022-10-06 PROCEDURE — 250N000011 HC RX IP 250 OP 636: Performed by: ANESTHESIOLOGY

## 2022-10-06 PROCEDURE — 250N000013 HC RX MED GY IP 250 OP 250 PS 637: Performed by: PHYSICIAN ASSISTANT

## 2022-10-06 PROCEDURE — 360N000077 HC SURGERY LEVEL 4, PER MIN: Performed by: ORTHOPAEDIC SURGERY

## 2022-10-06 PROCEDURE — 250N000011 HC RX IP 250 OP 636: Performed by: ORTHOPAEDIC SURGERY

## 2022-10-06 PROCEDURE — 258N000001 HC RX 258: Performed by: ORTHOPAEDIC SURGERY

## 2022-10-06 PROCEDURE — 250N000009 HC RX 250: Performed by: NURSE ANESTHETIST, CERTIFIED REGISTERED

## 2022-10-06 PROCEDURE — 370N000017 HC ANESTHESIA TECHNICAL FEE, PER MIN: Performed by: ORTHOPAEDIC SURGERY

## 2022-10-06 PROCEDURE — 258N000003 HC RX IP 258 OP 636: Performed by: PHYSICIAN ASSISTANT

## 2022-10-06 PROCEDURE — 250N000009 HC RX 250: Performed by: ANESTHESIOLOGY

## 2022-10-06 PROCEDURE — 250N000011 HC RX IP 250 OP 636: Performed by: NURSE ANESTHETIST, CERTIFIED REGISTERED

## 2022-10-06 PROCEDURE — 250N000025 HC SEVOFLURANE, PER MIN: Performed by: ORTHOPAEDIC SURGERY

## 2022-10-06 PROCEDURE — 710N000010 HC RECOVERY PHASE 1, LEVEL 2, PER MIN: Performed by: ORTHOPAEDIC SURGERY

## 2022-10-06 PROCEDURE — C9290 INJ, BUPIVACAINE LIPOSOME: HCPCS | Performed by: ANESTHESIOLOGY

## 2022-10-06 PROCEDURE — 258N000003 HC RX IP 258 OP 636: Performed by: NURSE ANESTHETIST, CERTIFIED REGISTERED

## 2022-10-06 PROCEDURE — 999N000141 HC STATISTIC PRE-PROCEDURE NURSING ASSESSMENT: Performed by: ORTHOPAEDIC SURGERY

## 2022-10-06 RX ORDER — FENTANYL CITRATE 50 UG/ML
50 INJECTION, SOLUTION INTRAMUSCULAR; INTRAVENOUS
Status: DISCONTINUED | OUTPATIENT
Start: 2022-10-06 | End: 2022-10-06 | Stop reason: HOSPADM

## 2022-10-06 RX ORDER — OXYCODONE HYDROCHLORIDE 5 MG/1
10 TABLET ORAL EVERY 4 HOURS PRN
Status: DISCONTINUED | OUTPATIENT
Start: 2022-10-06 | End: 2022-10-07 | Stop reason: HOSPADM

## 2022-10-06 RX ORDER — ONDANSETRON 2 MG/ML
INJECTION INTRAMUSCULAR; INTRAVENOUS PRN
Status: DISCONTINUED | OUTPATIENT
Start: 2022-10-06 | End: 2022-10-06

## 2022-10-06 RX ORDER — NALOXONE HYDROCHLORIDE 0.4 MG/ML
0.2 INJECTION, SOLUTION INTRAMUSCULAR; INTRAVENOUS; SUBCUTANEOUS
Status: DISCONTINUED | OUTPATIENT
Start: 2022-10-06 | End: 2022-10-07 | Stop reason: HOSPADM

## 2022-10-06 RX ORDER — VENLAFAXINE 25 MG/1
25 TABLET ORAL DAILY
Status: DISCONTINUED | OUTPATIENT
Start: 2022-10-06 | End: 2022-10-07 | Stop reason: HOSPADM

## 2022-10-06 RX ORDER — NEOSTIGMINE METHYLSULFATE 1 MG/ML
VIAL (ML) INJECTION PRN
Status: DISCONTINUED | OUTPATIENT
Start: 2022-10-06 | End: 2022-10-06

## 2022-10-06 RX ORDER — NALOXONE HYDROCHLORIDE 0.4 MG/ML
0.4 INJECTION, SOLUTION INTRAMUSCULAR; INTRAVENOUS; SUBCUTANEOUS
Status: DISCONTINUED | OUTPATIENT
Start: 2022-10-06 | End: 2022-10-07 | Stop reason: HOSPADM

## 2022-10-06 RX ORDER — SODIUM CHLORIDE, SODIUM LACTATE, POTASSIUM CHLORIDE, CALCIUM CHLORIDE 600; 310; 30; 20 MG/100ML; MG/100ML; MG/100ML; MG/100ML
INJECTION, SOLUTION INTRAVENOUS CONTINUOUS
Status: DISCONTINUED | OUTPATIENT
Start: 2022-10-06 | End: 2022-10-07 | Stop reason: HOSPADM

## 2022-10-06 RX ORDER — AMOXICILLIN 250 MG
1 CAPSULE ORAL 2 TIMES DAILY
Status: DISCONTINUED | OUTPATIENT
Start: 2022-10-06 | End: 2022-10-07 | Stop reason: HOSPADM

## 2022-10-06 RX ORDER — BUPIVACAINE HYDROCHLORIDE 5 MG/ML
INJECTION, SOLUTION EPIDURAL; INTRACAUDAL
Status: COMPLETED | OUTPATIENT
Start: 2022-10-06 | End: 2022-10-06

## 2022-10-06 RX ORDER — PROPOFOL 10 MG/ML
INJECTION, EMULSION INTRAVENOUS PRN
Status: DISCONTINUED | OUTPATIENT
Start: 2022-10-06 | End: 2022-10-06

## 2022-10-06 RX ORDER — SODIUM CHLORIDE, SODIUM LACTATE, POTASSIUM CHLORIDE, CALCIUM CHLORIDE 600; 310; 30; 20 MG/100ML; MG/100ML; MG/100ML; MG/100ML
INJECTION, SOLUTION INTRAVENOUS CONTINUOUS
Status: DISCONTINUED | OUTPATIENT
Start: 2022-10-06 | End: 2022-10-06 | Stop reason: HOSPADM

## 2022-10-06 RX ORDER — FLUTICASONE PROPIONATE 50 MCG
2 SPRAY, SUSPENSION (ML) NASAL DAILY PRN
Status: DISCONTINUED | OUTPATIENT
Start: 2022-10-06 | End: 2022-10-07 | Stop reason: HOSPADM

## 2022-10-06 RX ORDER — ACETAMINOPHEN 325 MG/1
975 TABLET ORAL EVERY 8 HOURS
Status: DISCONTINUED | OUTPATIENT
Start: 2022-10-06 | End: 2022-10-07 | Stop reason: HOSPADM

## 2022-10-06 RX ORDER — HYDROMORPHONE HCL IN WATER/PF 6 MG/30 ML
0.4 PATIENT CONTROLLED ANALGESIA SYRINGE INTRAVENOUS
Status: DISCONTINUED | OUTPATIENT
Start: 2022-10-06 | End: 2022-10-07 | Stop reason: HOSPADM

## 2022-10-06 RX ORDER — ACETAMINOPHEN 325 MG/1
650 TABLET ORAL EVERY 4 HOURS PRN
Status: DISCONTINUED | OUTPATIENT
Start: 2022-10-09 | End: 2022-10-07 | Stop reason: HOSPADM

## 2022-10-06 RX ORDER — BISACODYL 10 MG
10 SUPPOSITORY, RECTAL RECTAL DAILY PRN
Status: DISCONTINUED | OUTPATIENT
Start: 2022-10-06 | End: 2022-10-07 | Stop reason: HOSPADM

## 2022-10-06 RX ORDER — DEXMEDETOMIDINE HYDROCHLORIDE 4 UG/ML
INJECTION, SOLUTION INTRAVENOUS CONTINUOUS PRN
Status: DISCONTINUED | OUTPATIENT
Start: 2022-10-06 | End: 2022-10-06

## 2022-10-06 RX ORDER — KETOROLAC TROMETHAMINE 30 MG/ML
15 INJECTION, SOLUTION INTRAMUSCULAR; INTRAVENOUS EVERY 6 HOURS
Status: CANCELLED | OUTPATIENT
Start: 2022-10-06 | End: 2022-10-07

## 2022-10-06 RX ORDER — OXYCODONE HYDROCHLORIDE 5 MG/1
5 TABLET ORAL EVERY 4 HOURS PRN
Status: DISCONTINUED | OUTPATIENT
Start: 2022-10-06 | End: 2022-10-07 | Stop reason: HOSPADM

## 2022-10-06 RX ORDER — CALCIUM CARBONATE 500 MG/1
500 TABLET, CHEWABLE ORAL 4 TIMES DAILY PRN
Status: DISCONTINUED | OUTPATIENT
Start: 2022-10-06 | End: 2022-10-07 | Stop reason: HOSPADM

## 2022-10-06 RX ORDER — PROPRANOLOL HYDROCHLORIDE 80 MG/1
80 CAPSULE, EXTENDED RELEASE ORAL AT BEDTIME
Status: DISCONTINUED | OUTPATIENT
Start: 2022-10-06 | End: 2022-10-07 | Stop reason: HOSPADM

## 2022-10-06 RX ORDER — GLYCOPYRROLATE 0.2 MG/ML
INJECTION, SOLUTION INTRAMUSCULAR; INTRAVENOUS PRN
Status: DISCONTINUED | OUTPATIENT
Start: 2022-10-06 | End: 2022-10-06

## 2022-10-06 RX ORDER — HYDROXYZINE HYDROCHLORIDE 25 MG/1
25 TABLET, FILM COATED ORAL EVERY 6 HOURS PRN
Status: DISCONTINUED | OUTPATIENT
Start: 2022-10-06 | End: 2022-10-07 | Stop reason: HOSPADM

## 2022-10-06 RX ORDER — OXYCODONE HYDROCHLORIDE 5 MG/1
5 TABLET ORAL EVERY 4 HOURS PRN
Status: DISCONTINUED | OUTPATIENT
Start: 2022-10-06 | End: 2022-10-06 | Stop reason: HOSPADM

## 2022-10-06 RX ORDER — PANTOPRAZOLE SODIUM 20 MG/1
40 TABLET, DELAYED RELEASE ORAL
Status: DISCONTINUED | OUTPATIENT
Start: 2022-10-07 | End: 2022-10-07 | Stop reason: HOSPADM

## 2022-10-06 RX ORDER — ONDANSETRON 2 MG/ML
4 INJECTION INTRAMUSCULAR; INTRAVENOUS EVERY 6 HOURS PRN
Status: DISCONTINUED | OUTPATIENT
Start: 2022-10-06 | End: 2022-10-07 | Stop reason: HOSPADM

## 2022-10-06 RX ORDER — MAGNESIUM OXIDE 400 MG/1
400 TABLET ORAL 2 TIMES DAILY
COMMUNITY

## 2022-10-06 RX ORDER — LIDOCAINE HYDROCHLORIDE 10 MG/ML
INJECTION, SOLUTION INFILTRATION; PERINEURAL PRN
Status: DISCONTINUED | OUTPATIENT
Start: 2022-10-06 | End: 2022-10-06

## 2022-10-06 RX ORDER — RIZATRIPTAN BENZOATE 5 MG/1
10 TABLET, ORALLY DISINTEGRATING ORAL
Status: DISCONTINUED | OUTPATIENT
Start: 2022-10-06 | End: 2022-10-07 | Stop reason: HOSPADM

## 2022-10-06 RX ORDER — CEFAZOLIN SODIUM/WATER 3 G/30 ML
3 SYRINGE (ML) INTRAVENOUS
Status: COMPLETED | OUTPATIENT
Start: 2022-10-06 | End: 2022-10-06

## 2022-10-06 RX ORDER — BUPROPION HYDROCHLORIDE 150 MG/1
150 TABLET ORAL EVERY MORNING
Status: DISCONTINUED | OUTPATIENT
Start: 2022-10-07 | End: 2022-10-07 | Stop reason: HOSPADM

## 2022-10-06 RX ORDER — POLYETHYLENE GLYCOL 3350 17 G/17G
17 POWDER, FOR SOLUTION ORAL DAILY
Status: DISCONTINUED | OUTPATIENT
Start: 2022-10-07 | End: 2022-10-07 | Stop reason: HOSPADM

## 2022-10-06 RX ORDER — CEFAZOLIN SODIUM/WATER 3 G/30 ML
3 SYRINGE (ML) INTRAVENOUS SEE ADMIN INSTRUCTIONS
Status: DISCONTINUED | OUTPATIENT
Start: 2022-10-06 | End: 2022-10-06 | Stop reason: HOSPADM

## 2022-10-06 RX ORDER — AMOXICILLIN 250 MG
1-2 CAPSULE ORAL 2 TIMES DAILY
Qty: 30 TABLET | Refills: 0 | Status: SHIPPED | OUTPATIENT
Start: 2022-10-06 | End: 2022-12-19

## 2022-10-06 RX ORDER — PROCHLORPERAZINE MALEATE 10 MG
10 TABLET ORAL EVERY 6 HOURS PRN
Status: DISCONTINUED | OUTPATIENT
Start: 2022-10-06 | End: 2022-10-07 | Stop reason: HOSPADM

## 2022-10-06 RX ORDER — ONDANSETRON 2 MG/ML
4 INJECTION INTRAMUSCULAR; INTRAVENOUS EVERY 30 MIN PRN
Status: DISCONTINUED | OUTPATIENT
Start: 2022-10-06 | End: 2022-10-06 | Stop reason: HOSPADM

## 2022-10-06 RX ORDER — DEXAMETHASONE SODIUM PHOSPHATE 4 MG/ML
INJECTION, SOLUTION INTRA-ARTICULAR; INTRALESIONAL; INTRAMUSCULAR; INTRAVENOUS; SOFT TISSUE PRN
Status: DISCONTINUED | OUTPATIENT
Start: 2022-10-06 | End: 2022-10-06

## 2022-10-06 RX ORDER — HYDROMORPHONE HCL IN WATER/PF 6 MG/30 ML
0.2 PATIENT CONTROLLED ANALGESIA SYRINGE INTRAVENOUS EVERY 5 MIN PRN
Status: DISCONTINUED | OUTPATIENT
Start: 2022-10-06 | End: 2022-10-06 | Stop reason: HOSPADM

## 2022-10-06 RX ORDER — PROPOFOL 10 MG/ML
INJECTION, EMULSION INTRAVENOUS CONTINUOUS PRN
Status: DISCONTINUED | OUTPATIENT
Start: 2022-10-06 | End: 2022-10-06

## 2022-10-06 RX ORDER — ONDANSETRON 4 MG/1
4 TABLET, ORALLY DISINTEGRATING ORAL EVERY 30 MIN PRN
Status: DISCONTINUED | OUTPATIENT
Start: 2022-10-06 | End: 2022-10-06 | Stop reason: HOSPADM

## 2022-10-06 RX ORDER — HYDROXYZINE HYDROCHLORIDE 25 MG/1
25 TABLET, FILM COATED ORAL EVERY 6 HOURS PRN
Qty: 30 TABLET | Refills: 0 | Status: SHIPPED | OUTPATIENT
Start: 2022-10-06 | End: 2022-12-19

## 2022-10-06 RX ORDER — CEFAZOLIN SODIUM 2 G/100ML
2 INJECTION, SOLUTION INTRAVENOUS EVERY 8 HOURS
Status: COMPLETED | OUTPATIENT
Start: 2022-10-06 | End: 2022-10-07

## 2022-10-06 RX ORDER — LIDOCAINE 40 MG/G
CREAM TOPICAL
Status: DISCONTINUED | OUTPATIENT
Start: 2022-10-06 | End: 2022-10-07 | Stop reason: HOSPADM

## 2022-10-06 RX ORDER — HYDROMORPHONE HCL IN WATER/PF 6 MG/30 ML
0.2 PATIENT CONTROLLED ANALGESIA SYRINGE INTRAVENOUS
Status: DISCONTINUED | OUTPATIENT
Start: 2022-10-06 | End: 2022-10-07 | Stop reason: HOSPADM

## 2022-10-06 RX ORDER — LIDOCAINE 40 MG/G
CREAM TOPICAL
Status: DISCONTINUED | OUTPATIENT
Start: 2022-10-06 | End: 2022-10-06 | Stop reason: HOSPADM

## 2022-10-06 RX ORDER — ROPINIROLE 0.25 MG/1
0.25 TABLET, FILM COATED ORAL 2 TIMES DAILY
Status: DISCONTINUED | OUTPATIENT
Start: 2022-10-06 | End: 2022-10-07 | Stop reason: HOSPADM

## 2022-10-06 RX ORDER — OXYCODONE HYDROCHLORIDE 5 MG/1
5-10 TABLET ORAL EVERY 6 HOURS PRN
Qty: 20 TABLET | Refills: 0 | Status: SHIPPED | OUTPATIENT
Start: 2022-10-06 | End: 2022-12-19

## 2022-10-06 RX ORDER — ONDANSETRON 4 MG/1
4 TABLET, ORALLY DISINTEGRATING ORAL EVERY 6 HOURS PRN
Status: DISCONTINUED | OUTPATIENT
Start: 2022-10-06 | End: 2022-10-07 | Stop reason: HOSPADM

## 2022-10-06 RX ORDER — FENTANYL CITRATE 50 UG/ML
INJECTION, SOLUTION INTRAMUSCULAR; INTRAVENOUS PRN
Status: DISCONTINUED | OUTPATIENT
Start: 2022-10-06 | End: 2022-10-06

## 2022-10-06 RX ORDER — HYDROCHLOROTHIAZIDE 25 MG/1
50 TABLET ORAL ONCE
Status: COMPLETED | OUTPATIENT
Start: 2022-10-06 | End: 2022-10-06

## 2022-10-06 RX ORDER — PREGABALIN 100 MG/1
100 CAPSULE ORAL DAILY
Status: DISCONTINUED | OUTPATIENT
Start: 2022-10-07 | End: 2022-10-07 | Stop reason: HOSPADM

## 2022-10-06 RX ADMIN — CEFAZOLIN SODIUM 2 G: 2 INJECTION, SOLUTION INTRAVENOUS at 20:01

## 2022-10-06 RX ADMIN — MIDAZOLAM HYDROCHLORIDE 2 MG: 1 INJECTION, SOLUTION INTRAMUSCULAR; INTRAVENOUS at 11:36

## 2022-10-06 RX ADMIN — PROPOFOL 200 MG: 10 INJECTION, EMULSION INTRAVENOUS at 12:15

## 2022-10-06 RX ADMIN — PROPOFOL 50 MCG/KG/MIN: 10 INJECTION, EMULSION INTRAVENOUS at 12:26

## 2022-10-06 RX ADMIN — BUPIVACAINE HYDROCHLORIDE 20 ML: 5 INJECTION, SOLUTION EPIDURAL; INTRACAUDAL; PERINEURAL at 11:34

## 2022-10-06 RX ADMIN — HYDROCHLOROTHIAZIDE 50 MG: 25 TABLET ORAL at 18:33

## 2022-10-06 RX ADMIN — Medication 3 G: at 12:08

## 2022-10-06 RX ADMIN — LIDOCAINE HYDROCHLORIDE 20 MG: 10 INJECTION, SOLUTION INFILTRATION; PERINEURAL at 12:15

## 2022-10-06 RX ADMIN — GLYCOPYRROLATE 0.8 MG: 0.2 INJECTION, SOLUTION INTRAMUSCULAR; INTRAVENOUS at 13:45

## 2022-10-06 RX ADMIN — BUPIVACAINE 10 ML: 13.3 INJECTION, SUSPENSION, LIPOSOMAL INFILTRATION at 11:34

## 2022-10-06 RX ADMIN — ROPINIROLE HYDROCHLORIDE 0.25 MG: 0.25 TABLET, FILM COATED ORAL at 22:39

## 2022-10-06 RX ADMIN — PHENYLEPHRINE HYDROCHLORIDE 100 MCG: 10 INJECTION INTRAVENOUS at 12:48

## 2022-10-06 RX ADMIN — SENNOSIDES AND DOCUSATE SODIUM 1 TABLET: 50; 8.6 TABLET ORAL at 20:01

## 2022-10-06 RX ADMIN — ROPINIROLE HYDROCHLORIDE 0.25 MG: 0.25 TABLET, FILM COATED ORAL at 18:41

## 2022-10-06 RX ADMIN — FENTANYL CITRATE 50 MCG: 50 INJECTION, SOLUTION INTRAMUSCULAR; INTRAVENOUS at 12:25

## 2022-10-06 RX ADMIN — ONDANSETRON 4 MG: 2 INJECTION INTRAMUSCULAR; INTRAVENOUS at 13:55

## 2022-10-06 RX ADMIN — DEXAMETHASONE SODIUM PHOSPHATE 4 MG: 4 INJECTION, SOLUTION INTRA-ARTICULAR; INTRALESIONAL; INTRAMUSCULAR; INTRAVENOUS; SOFT TISSUE at 12:15

## 2022-10-06 RX ADMIN — VENLAFAXINE 25 MG: 25 TABLET ORAL at 18:41

## 2022-10-06 RX ADMIN — PROPRANOLOL HYDROCHLORIDE 80 MG: 80 CAPSULE, EXTENDED RELEASE ORAL at 20:01

## 2022-10-06 RX ADMIN — ROCURONIUM BROMIDE 20 MG: 10 INJECTION, SOLUTION INTRAVENOUS at 13:02

## 2022-10-06 RX ADMIN — SODIUM CHLORIDE, POTASSIUM CHLORIDE, SODIUM LACTATE AND CALCIUM CHLORIDE: 600; 310; 30; 20 INJECTION, SOLUTION INTRAVENOUS at 10:28

## 2022-10-06 RX ADMIN — ROCURONIUM BROMIDE 50 MG: 10 INJECTION, SOLUTION INTRAVENOUS at 12:15

## 2022-10-06 RX ADMIN — OXYCODONE HYDROCHLORIDE 5 MG: 5 TABLET ORAL at 21:02

## 2022-10-06 RX ADMIN — DEXMEDETOMIDINE 0.5 MCG/KG/HR: 100 INJECTION, SOLUTION, CONCENTRATE INTRAVENOUS at 12:30

## 2022-10-06 RX ADMIN — SODIUM CHLORIDE, POTASSIUM CHLORIDE, SODIUM LACTATE AND CALCIUM CHLORIDE: 600; 310; 30; 20 INJECTION, SOLUTION INTRAVENOUS at 13:42

## 2022-10-06 RX ADMIN — ACETAMINOPHEN 975 MG: 325 TABLET, FILM COATED ORAL at 18:33

## 2022-10-06 RX ADMIN — FENTANYL CITRATE 50 MCG: 50 INJECTION, SOLUTION INTRAMUSCULAR; INTRAVENOUS at 12:15

## 2022-10-06 RX ADMIN — NEOSTIGMINE METHYLSULFATE 5 MG: 1 INJECTION, SOLUTION INTRAVENOUS at 13:45

## 2022-10-06 RX ADMIN — SODIUM CHLORIDE, POTASSIUM CHLORIDE, SODIUM LACTATE AND CALCIUM CHLORIDE: 600; 310; 30; 20 INJECTION, SOLUTION INTRAVENOUS at 18:24

## 2022-10-06 RX ADMIN — FENTANYL CITRATE 100 MCG: 50 INJECTION, SOLUTION INTRAMUSCULAR; INTRAVENOUS at 11:35

## 2022-10-06 ASSESSMENT — ACTIVITIES OF DAILY LIVING (ADL)
ADLS_ACUITY_SCORE: 35
ADLS_ACUITY_SCORE: 40
ADLS_ACUITY_SCORE: 39
ADLS_ACUITY_SCORE: 35
ADLS_ACUITY_SCORE: 39

## 2022-10-06 NOTE — ANESTHESIA PROCEDURE NOTES
Interscalene Procedure Note    Pre-Procedure   Staff -        Anesthesiologist:  Casper Tyler MD       Performed By: anesthesiologist       Location: pre-op       Procedure Start/Stop Times: 10/6/2022 11:34 AM and 10/6/2022 11:40 AM       Pre-Anesthestic Checklist: patient identified, IV checked, site marked, risks and benefits discussed, informed consent, monitors and equipment checked, pre-op evaluation, at physician/surgeon's request and post-op pain management  Timeout:       Correct Patient: Yes        Correct Procedure: Yes        Correct Site: Yes        Correct Position: Yes        Correct Laterality: Yes        Site Marked: Yes  Procedure Documentation  Procedure: Interscalene       Laterality: right       Patient Position: sitting       Patient Prep/Sterile Barriers: sterile gloves, mask       Skin prep: Chloraprep       Needle Type: short bevel       Needle Gauge: 22.        Needle Length (Inches): 4        Ultrasound guided       1. Ultrasound was used to identify targeted nerve, plexus, vascular marker, or fascial plane and place a needle adjacent to it in real-time.       2. Ultrasound was used to visualize the spread of anesthetic in close proximity to the above referenced structure.       3. A permanent image is entered into the patient's record.       4. The visualized anatomic structures appeared normal.       5. There were no apparent abnormal pathologic findings.    Assessment/Narrative         The placement was negative for: blood aspirated, painful injection and site bleeding       Paresthesias: No.       Bolus given via needle. no blood aspirated via catheter.        Secured via.        Insertion/Infusion Method: Single Shot       Complications: none    Medication(s) Administered   Bupivacaine 0.5% PF (Infiltration) - Infiltration   20 mL - 10/6/2022 11:34:00 AM  Bupivacaine liposome (Exparel) 1.3% LA inj susp (Infiltration) - Infiltration   10 mL - 10/6/2022 11:34:00 AM  Medication  Administration Time: 10/6/2022 11:34 AM

## 2022-10-06 NOTE — INTERVAL H&P NOTE
"I have reviewed the surgical (or preoperative) H&P that is linked to this encounter, and examined the patient. There are no significant changes    Clinical Conditions Present on Arrival:  Clinically Significant Risk Factors Present on Admission                   # Severe Obesity: Estimated body mass index is 59.31 kg/m  as calculated from the following:    Height as of 9/21/22: 1.683 m (5' 6.25\").    Weight as of 9/21/22: 167.9 kg (370 lb 4 oz).     Jai Magdaleno MD, DO on 10/6/2022 at 9:42 AM    "

## 2022-10-06 NOTE — ANESTHESIA PROCEDURE NOTES
Airway         Procedure Start/Stop Times: 10/6/2022 12:17 PM  Staff -        Anesthesiologist:  Casper Tyler MD       CRNA: Kezia Carbajal APRN CRNA       Performed By: anesthesiologistIndications and Patient Condition       Indications for airway management: elenita-procedural       Induction type:intravenous       Mask difficulty assessment: 1 - vent by mask    Final Airway Details       Final airway type: endotracheal airway       Successful airway: ETT - single  Endotracheal Airway Details        ETT size (mm): 7.5       Cuffed: yes       Cuff volume (mL): 7       Successful intubation technique: direct laryngoscopy       DL Blade Type: Landaverde 2       Grade View of Cords: 1       Adjucts: stylet and tooth guard       Position: Left       Measured from: gums/teeth       Secured at (cm): 22       Bite block used: Soft    Post intubation assessment        Placement verified by: capnometry, equal breath sounds and chest rise        Number of attempts at approach: 1       Number of other approaches attempted: 0       Secured with: silk tape       Ease of procedure: easy (small mouth and large tonguebut good view of cords with cricoid pressure)       Dentition: Intact and Unchanged    Medication(s) Administered   Medication Administration Time: 10/6/2022 12:17 PM

## 2022-10-06 NOTE — OP NOTE
Procedure Date: 10/06/2022    PREOPERATIVE DIAGNOSES:  Right shoulder impingement syndrome with biceps tendinitis.    POSTOPERATIVE DIAGNOSES:  Right shoulder impingement syndrome with biceps tendinitis, including full thickness delamination of articular cartilage to the inferior third of the glenoid.    PROCEDURE:  Right shoulder arthroscopy, chondroplasty, microfracture glenoid, acromioplasty, and biceps tenotomy.    SURGEON:  Jai Magdaleno DO    ASSISTANT:  Sindhu Guzman, expert PA-USAMA, who assisted in patient positioning, patient safety, instrumentation assistance, incision closure, dressing and sling application.  Her training in orthopedic surgery was invaluable throughout the procedure and the portions she assisted with were not appropriate for a surgical technician.    ANESTHESIA:  General with regional block.    ESTIMATED BLOOD LOSS:  Minimal.    SPECIMENS:  None.    COMPLICATIONS:  None.    DISPOSITION:  The patient tolerated the procedure well.    INDICATIONS FOR PROCEDURE:  Estephania Wong is a 46-year-old female with ongoing chronic right shoulder pain, has failed outpatient nonoperative therapies due to the above-stated preoperative diagnosis.  After discussing the diagnosis, risks, benefits, potential complications, alternatives, we elected to proceed with surgical treatment.  She understands the risks of surgery to include but not be limited to bleeding, infection, blood clot, reaction to anesthesia, need for future surgery, injury to artery, nerve, artery, vein and/or death.  She received a preoperative history and physical. In the preoperative holding area, operative extremity was marked.  Informed consent was obtained.  She received preoperative antibiotics and an interscalene block.    PROCEDURE AND FINDINGS:  The patient was transferred to the operative suite and anesthetized.  She was placed in beach chair on the [facility].  Her head was well secured and all bony prominences were well padded.   The right upper extremity was prepped and draped in the usual sterile fashion.  Appropriate timeout was taken.  Posterior portal was localized with a spinal needle.  Joint was insufflated with fluid.  A stab incision was created.  The arthroscope was placed into the joint showing intact articular cartilage to the humeral head and superior two-thirds of the glenoid.  Anterior inferior glenoid showed large carpet-type delamination of the articular cartilage with a posterior hinge. Labrum was intact.  Biceps anchor was normal.  Biceps tendon was normally located.  Looking anteriorly showed intact subscapularis at the lesser tuberosity.  Axillary recess was unremarkable.  No loose joint bodies were noted.  A look superiorly showed very low-grade articular-sided tearing of the rotator cuff at the greater tuberosity.  Anterior superior portal was created.  Probing the superior labrum showed no significant separation of the labrum from the glenoid.  Retraction of the biceps tendon showed it to be erythematous and thickened.  Meniscal biter was then used to complete a biceps tenotomy. Mechanical shaver was reintroduced into the joint and tendinous debris was lavaged, followed by chondroplasty to the inferior glenoid to remove all unstable cartilage.  Mechanical shaver then used to debride the subchondral bone and a microfracture awl used to stimulate subchondral bone with good evidence of bleeding.  Bony debris was lavaged.  Arthroscope was removed from the joint and placed in the subacromial space where marked subacromial subdeltoid bursitis was encountered.  Lateral working portal was created.  Subacromial, subdeltoid bursectomies were completed with mechanical shaver.  Soft tissue ablator was then used to skeletonize the acromion and AC joint, showing maintenance of the AC joint with slight inferior hypertrophic spur.  An anteroinferior acromial prominence.  Mechanical bur was then used to complete acromioplasty with  coplaning of the distal clavicle.  Viewing laterally, inspection of the rotator cuff from the bursal aspect showed evidence of high-grade or full thickness tears. Subacromial space was thoroughly lavaged and aspirated and the arthroscope and equipment removed.  Arthroscopy portals were closed with simple nylon sutures.  Soft sterile compression dressing was applied, followed by application of a super sling.  The patient was transferred to recovery room in satisfactory condition.    Jai Magdaleno DO        D: 10/06/2022   T: 10/06/2022   MT: peggy    Name:     RAGHAVENDRA WILLIAMBradley  MRN:      [id number]        Account:        [id number]   :      1975           Procedure Date: 10/06/2022     Document: [id number]

## 2022-10-06 NOTE — ANESTHESIA POSTPROCEDURE EVALUATION
Patient: Estephania Wong    Procedure: Procedure(s):  RIGHT SHOULDER ARTHROSCOPY, ACROMIOPLASTY, BICEPS TENOTOMY, CHONDROPLASTY WITH MICROFRACTURE GLENOID       Anesthesia Type:  General    Note:  Disposition: Inpatient   Postop Pain Control: Uneventful            Sign Out: Well controlled pain   PONV: No   Neuro/Psych: Uneventful            Sign Out: Acceptable/Baseline neuro status   Airway/Respiratory: Uneventful            Sign Out: Acceptable/Baseline resp. status   CV/Hemodynamics: Uneventful            Sign Out: Acceptable CV status; No obvious hypovolemia; No obvious fluid overload   Other NRE: NONE   DID A NON-ROUTINE EVENT OCCUR? No           Last vitals:  Vitals Value Taken Time   /67 10/06/22 1420   Temp 35.9  C (96.6  F) 10/06/22 1418   Pulse 82 10/06/22 1421   Resp 24 10/06/22 1421   SpO2 96 % 10/06/22 1421   Vitals shown include unvalidated device data.    Electronically Signed By: Casper Tyler MD  October 6, 2022  2:23 PM

## 2022-10-06 NOTE — PHARMACY-ADMISSION MEDICATION HISTORY
Pharmacy Note - Admission Medication History    Pertinent Provider Information: N/A   ______________________________________________________________________    Prior To Admission (PTA) med list completed and updated in EMR.       PTA Med List   Medication Sig Last Dose     aspirin (ASA) 325 MG EC tablet Take 1 tablet (325 mg) by mouth daily      buPROPion (WELLBUTRIN XL) 150 MG 24 hr tablet Take 1 tablet (150 mg) by mouth every morning 10/6/2022 at AM     celecoxib (CELEBREX) 100 MG capsule Take 100 mg by mouth 2 times daily 10/6/2022 at AM     clonazePAM (KLONOPIN) 1 MG tablet TAKE ONE TABLET BY MOUTH EVERY NIGHT AT BEDTIME 10/5/2022 at PM     fluticasone (FLONASE) 50 MCG/ACT nasal spray Spray 2 sprays in nostril daily as needed Unknown at Unknown time     hydrochlorothiazide (HYDRODIURIL) 50 MG tablet Take 50 mg by mouth daily 10/5/2022 at AM     hydrOXYzine (ATARAX) 25 MG tablet Take 1 tablet (25 mg) by mouth every 6 hours as needed for itching or anxiety (with pain, moderate pain)      ibuprofen (ADVIL/MOTRIN) 200 MG capsule Take 800 mg by mouth 2 times daily as needed for pain (back pain - 4-6 tabs/day) Unknown at Unknown time     levonorgestrel (MIRENA) 20 MCG/DAY IUD 1 each by Intrauterine route once      magnesium oxide (MAG-OX) 400 MG tablet Take 400 mg by mouth 2 times daily 10/5/2022 at Unknown time     medical cannabis (Patient's own supply)       methocarbamol (ROBAXIN) 500 MG tablet Take 1 tablet (500 mg) by mouth daily as needed for muscle spasms Unknown at Unknown time     multivitamin w/minerals (THERA-VIT-M) tablet Take 1 tablet by mouth daily 9/29/2022     nortriptyline (PAMELOR) 10 MG capsule Take 20 mg by mouth At Bedtime 10/5/2022 at PM     omeprazole (PRILOSEC) 20 MG DR capsule Take 20 mg by mouth daily 10/5/2022 at AM     oxyCODONE (ROXICODONE) 5 MG tablet Take 1-2 tablets (5-10 mg) by mouth every 6 hours as needed for moderate to severe pain      potassium chloride ER (KLOR-CON M) 20 MEQ CR  tablet Take 20 mEq by mouth daily 10/5/2022 at AM     pregabalin (LYRICA) 100 MG capsule Take 100 mg by mouth 2 times daily Morning and Afternoon 10/6/2022 at AM x1     Pregabalin (LYRICA) 200 MG capsule Take 200 mg by mouth At Bedtime 10/5/2022 at PM     propranolol ER (INDERAL LA) 80 MG 24 hr capsule Take 1 capsule (80 mg) by mouth daily 10/5/2022 at PM     rizatriptan (MAXALT-MLT) 10 MG ODT Take 10 mg by mouth at onset of headache Unknown at Unknown time     rOPINIRole (REQUIP) 0.25 MG tablet Take 0.25 mg by mouth 2 times daily 6PM, 11PM 10/5/2022 at 2300     senna-docusate (SENOKOT-S/PERICOLACE) 8.6-50 MG tablet Take 1-2 tablets by mouth 2 times daily Take while on oral narcotics to prevent or treat constipation.      tiZANidine (ZANAFLEX) 2 MG tablet Take 2 mg by mouth At Bedtime 10/5/2022 at PM     venlafaxine (EFFEXOR) 25 MG tablet Take 25 mg by mouth daily (with lunch) 10/5/2022 at 1200     Vitamin D3 (CHOLECALCIFEROL) 125 MCG (5000 UT) tablet Take 1 tablet by mouth daily 10/5/2022 at Unknown time     zolpidem (AMBIEN) 5 MG tablet TAKE 1 TABLET BY MOUTH EVERY DAY AT BEDTIME AS NEEDED FOR SLEEP Unknown at Unknown time       Information source(s): Patient and Clinic records    Method of interview communication: in-person    Patient was asked about OTC/herbal products specifically.  PTA med list reflects this.    Based on the pharmacist's assessment, the PTA med list information appears reliable    Allergies were reviewed, assessed, and updated with the patient.      Patient does not use any multi-dose medications prior to admission.     Thank you for the opportunity to participate in the care of this patient.      Omar Roberts [facility]     10/6/2022     10:34 AM

## 2022-10-06 NOTE — ANESTHESIA CARE TRANSFER NOTE
Patient: Estephania Wong    Procedure: Procedure(s):  RIGHT SHOULDER ARTHROSCOPY, ACROMIOPLASTY, BICEPS TENOTOMY, CHONDROPLASTY WITH MICROFRACTURE GLENOID       Diagnosis: Biceps tendinitis [M75.20]  Osteoarthritis of AC (acromioclavicular) joint [M19.019]  Shoulder impingement, right [M75.41]  Diagnosis Additional Information: No value filed.    Anesthesia Type:   General     Note:    Oropharynx: nasal airway in place  Level of Consciousness: awake  Oxygen Supplementation: face mask  Level of Supplemental Oxygen (L/min / FiO2): 6  Independent Airway: airway patency satisfactory and stable  Dentition: dentition unchanged  Vital Signs Stable: post-procedure vital signs reviewed and stable  Report to RN Given: handoff report given  Patient transferred to: PACU    Handoff Report: Identifed the Patient, Identified the Reponsible Provider, Reviewed the pertinent medical history, Discussed the surgical course, Reviewed Intra-OP anesthesia mangement and issues during anesthesia, Set expectations for post-procedure period and Allowed opportunity for questions and acknowledgement of understanding      Vitals:  Vitals Value Taken Time   /67 10/06/22 1420   Temp 35.9  C (96.6  F) 10/06/22 1418   Pulse 79 10/06/22 1420   Resp 26 10/06/22 1420   SpO2 96 % 10/06/22 1420   Vitals shown include unvalidated device data.    Electronically Signed By: HILDA Washington CRNA  October 6, 2022  2:22 PM

## 2022-10-06 NOTE — ANESTHESIA PREPROCEDURE EVALUATION
Anesthesia Pre-Procedure Evaluation    Patient: Estephania Wong   MRN: [id number] : 1975        Procedure : Procedure(s):  RIGHT SHOULDER ARTHROSCOPY, ACROMIOPLASTY, BICEPS TENOTOMY          Past Medical History:   Diagnosis Date     Anemia 2019     Back pain 10/2013     Fracture of ankle, left, closed     with plates & screws     Hypertension      Hypocalcemia 2016    Formatting of this note might be different from the original. Formatting of this note might be different from the original. Noted on 2016. Formatting of this note might be different from the original. Noted on 2016.     Iron deficiency anemia 2015     IUD contraception      LBP (low back pain) 2015     Obese      Pneumonia      Pulmonary embolism, bilateral (H)      Sleep apnea       Past Surgical History:   Procedure Laterality Date     GALLBLADDER SURGERY  1999     HC GASTROCNEMIUS RECESSION Left 10/03/2019    Procedure: LEFT ANKLE ACHILLES TENDON DEBRIDEMENT INSERTIONAL ACHILLES TENDINOPATHY DEBRIDEMENT YULIA EXCISION GASTROC RECESSION;  Surgeon: Jace Rocha DO;  Location: [facility] Main OR;  Service: Orthopedics     HC GASTROCNEMIUS RECESSION Right 2019    Procedure: GASTROCNEMIUS RECESSIN;  Surgeon: Jace Rocha DO;  Location: [facility] Main OR;  Service: Orthopedics     LAMINECTOMY       REMOVE HARDWARE LOWER EXTREMITY Left 10/03/2019    Procedure: LEFT ANKLE HARDWARE REMOVAL;  Surgeon: Jace Rocha DO;  Location: [facility] Main OR;  Service: Orthopedics     REPAIR TENDON ACHILLES Right 2019    Procedure: RIGHT HAGLUNDS RESECTION,ACHILLES REPAIR,;  Surgeon: Jace Rocha DO;  Location: [facility] Main OR;  Service: Orthopedics      Allergies   Allergen Reactions     Vancomycin Itching     Metformin Nausea     Sulfa Drugs Rash      Social History     Tobacco Use     Smoking status: Never Smoker     Smokeless tobacco:  Never Used   Substance Use Topics     Alcohol use: Never      Wt Readings from Last 1 Encounters:   10/06/22 (!) 167.7 kg (369 lb 11.2 oz)        Anesthesia Evaluation            ROS/MED HX  ENT/Pulmonary:  - neg pulmonary ROS   (+) sleep apnea, uses CPAP, RAYRAY risk factors, recent URI,     Neurologic:  - neg neurologic ROS     Cardiovascular:  - neg cardiovascular ROS   (+) hypertension-----    METS/Exercise Tolerance:     Hematologic:  - neg hematologic  ROS     Musculoskeletal:  - neg musculoskeletal ROS     GI/Hepatic:     (+) GERD,     Renal/Genitourinary:  - neg Renal ROS     Endo: Comment: BMI 59    (+) thyroid problem, Obesity,     Psychiatric/Substance Use:  - neg psychiatric ROS     Infectious Disease:  - neg infectious disease ROS     Malignancy:  - neg malignancy ROS     Other:  - neg other ROS          Physical Exam    Airway        Mallampati: II   TM distance: > 3 FB   Neck ROM: full   Mouth opening: > 3 cm    Respiratory Devices and Support         Dental  no notable dental history         Cardiovascular   cardiovascular exam normal       Rhythm and rate: regular and normal     Pulmonary   pulmonary exam normal                OUTSIDE LABS:  CBC:   Lab Results   Component Value Date    WBC 8.2 09/21/2022    WBC 12.0 (H) 07/29/2022    HGB 13.0 09/21/2022    HGB 13.4 07/29/2022    HCT 39.8 09/21/2022    HCT 40.8 07/29/2022     09/21/2022     07/29/2022     BMP:   Lab Results   Component Value Date     09/21/2022     07/29/2022    POTASSIUM 3.7 09/21/2022    POTASSIUM 3.8 07/29/2022    CHLORIDE 100 09/21/2022    CHLORIDE 100 07/29/2022    CO2 27 09/21/2022    CO2 24 07/29/2022    BUN 14.7 09/21/2022    BUN 18.7 07/29/2022    CR 0.90 09/21/2022    CR 0.85 07/29/2022     (H) 09/21/2022    GLC 99 07/29/2022     COAGS:   Lab Results   Component Value Date    PTT 29 05/06/2009    INR 1.07 11/23/2015    FIBR 429 (H) 05/06/2009     POC:   Lab Results   Component Value Date    BGM  141 (H) 05/07/2009    HCG Negative 10/03/2022    HCGS Negative 09/21/2022     HEPATIC:   Lab Results   Component Value Date    ALBUMIN 4.1 09/21/2022    PROTTOTAL 7.2 09/21/2022    ALT 27 09/21/2022    AST 24 09/21/2022    ALKPHOS 62 09/21/2022    BILITOTAL 0.5 09/21/2022     OTHER:   Lab Results   Component Value Date    A1C 6.2 (H) 07/29/2022    MABEL 9.3 09/21/2022    PHOS 3.2 05/08/2009    MAG 1.9 05/07/2009    LIPASE 68 (H) 11/06/2018    TSH 4.23 (H) 07/29/2022    T4 1.08 07/29/2022    CRP 5.7 (H) 06/28/2019    SED 30 (H) 06/28/2019       Anesthesia Plan    ASA Status:  3   NPO Status:  NPO Appropriate    Anesthesia Type: General.     - Airway: ETT      Maintenance: Balanced.        Consents    Anesthesia Plan(s) and associated risks, benefits, and realistic alternatives discussed. Questions answered and patient/representative(s) expressed understanding.    - Discussed:     - Discussed with:  Patient, Spouse      - Extended Intubation/Ventilatory Support Discussed: No.      - Patient is DNR/DNI Status: No    Use of blood products discussed: No .     Postoperative Care    Pain management: IV analgesics, Peripheral nerve block (Single Shot).   PONV prophylaxis: Ondansetron (or other 5HT-3), Aprepitant     Comments:    Other Comments: Plan for GETA with zofran, decadron, RISNB PSR for POPC            Casper Tyler MD

## 2022-10-06 NOTE — BRIEF OP NOTE
[facility]    Brief Operative Note    Pre-operative diagnosis: Biceps tendinitis [M75.20]  Osteoarthritis of AC (acromioclavicular) joint [M19.019]  Shoulder impingement, right [M75.41]  Post-operative diagnosis Same as preoperative diagnosis including full thickness articular cartilage delamination to the glendoid    Procedure: Procedure(s):  RIGHT SHOULDER ARTHROSCOPY, ACROMIOPLASTY, BICEPS TENOTOMY, CHONDROPLASTY WITH MICROFRACTURE GLENOID  Surgeon: Surgeon(s) and Role:     * Jai Magdaleno MD, DO - Primary     * Sindhu Guzman PA-C - Assisting  Anesthesia: General with Block   Estimated Blood Loss: Minimal    Drains: None  Specimens: * No specimens in log *  Findings:   See dictated report  Complications: None.  Implants: * No implants in log *    Jai Magdaleno MD, DO on 10/6/2022 at 1:59 PM

## 2022-10-07 ENCOUNTER — APPOINTMENT (OUTPATIENT)
Dept: OCCUPATIONAL THERAPY | Facility: CLINIC | Age: 47
End: 2022-10-07
Attending: PHYSICIAN ASSISTANT
Payer: COMMERCIAL

## 2022-10-07 VITALS
RESPIRATION RATE: 20 BRPM | HEART RATE: 78 BPM | WEIGHT: 293 LBS | OXYGEN SATURATION: 93 % | HEIGHT: 66 IN | DIASTOLIC BLOOD PRESSURE: 59 MMHG | BODY MASS INDEX: 47.09 KG/M2 | SYSTOLIC BLOOD PRESSURE: 116 MMHG | TEMPERATURE: 97.7 F

## 2022-10-07 LAB
FASTING STATUS PATIENT QL REPORTED: ABNORMAL
GLUCOSE BLD-MCNC: 137 MG/DL (ref 70–125)
HGB BLD-MCNC: 13.1 G/DL (ref 11.7–15.7)

## 2022-10-07 PROCEDURE — 250N000013 HC RX MED GY IP 250 OP 250 PS 637: Performed by: PHYSICIAN ASSISTANT

## 2022-10-07 PROCEDURE — 97166 OT EVAL MOD COMPLEX 45 MIN: CPT | Mod: GO

## 2022-10-07 PROCEDURE — 85018 HEMOGLOBIN: CPT | Performed by: PHYSICIAN ASSISTANT

## 2022-10-07 PROCEDURE — 97535 SELF CARE MNGMENT TRAINING: CPT | Mod: GO

## 2022-10-07 PROCEDURE — 36415 COLL VENOUS BLD VENIPUNCTURE: CPT | Performed by: ORTHOPAEDIC SURGERY

## 2022-10-07 PROCEDURE — 82947 ASSAY GLUCOSE BLOOD QUANT: CPT | Performed by: ORTHOPAEDIC SURGERY

## 2022-10-07 PROCEDURE — 250N000011 HC RX IP 250 OP 636: Performed by: PHYSICIAN ASSISTANT

## 2022-10-07 RX ORDER — ACETAMINOPHEN 325 MG/1
650 TABLET ORAL EVERY 4 HOURS PRN
COMMUNITY
Start: 2022-10-09

## 2022-10-07 RX ADMIN — SENNOSIDES AND DOCUSATE SODIUM 1 TABLET: 50; 8.6 TABLET ORAL at 09:29

## 2022-10-07 RX ADMIN — OXYCODONE HYDROCHLORIDE 5 MG: 5 TABLET ORAL at 02:41

## 2022-10-07 RX ADMIN — PANTOPRAZOLE SODIUM 40 MG: 20 TABLET, DELAYED RELEASE ORAL at 05:56

## 2022-10-07 RX ADMIN — VENLAFAXINE 25 MG: 25 TABLET ORAL at 09:29

## 2022-10-07 RX ADMIN — BUPROPION 150 MG: 150 TABLET, EXTENDED RELEASE ORAL at 11:35

## 2022-10-07 RX ADMIN — ACETAMINOPHEN 975 MG: 325 TABLET, FILM COATED ORAL at 09:29

## 2022-10-07 RX ADMIN — POLYETHYLENE GLYCOL (3350) 17 G: 17 POWDER, FOR SOLUTION ORAL at 09:27

## 2022-10-07 RX ADMIN — CEFAZOLIN SODIUM 2 G: 2 INJECTION, SOLUTION INTRAVENOUS at 04:15

## 2022-10-07 RX ADMIN — ACETAMINOPHEN 975 MG: 325 TABLET, FILM COATED ORAL at 00:31

## 2022-10-07 RX ADMIN — BENZOCAINE AND MENTHOL 1 LOZENGE: 15; 3.6 LOZENGE ORAL at 01:00

## 2022-10-07 RX ADMIN — OXYCODONE HYDROCHLORIDE 10 MG: 5 TABLET ORAL at 06:54

## 2022-10-07 RX ADMIN — PREGABALIN 100 MG: 100 CAPSULE ORAL at 09:30

## 2022-10-07 ASSESSMENT — ACTIVITIES OF DAILY LIVING (ADL)
ADLS_ACUITY_SCORE: 42
IADL_COMMENTS: SPOUSE CAN ASSIST AS NEEDED
ADLS_ACUITY_SCORE: 40
ADLS_ACUITY_SCORE: 42
PREVIOUS_RESPONSIBILITIES: MEAL PREP;HOUSEKEEPING;MEDICATION MANAGEMENT

## 2022-10-07 NOTE — DISCHARGE SUMMARY
"Discharge instruction and education provided to pt including \"Stop\" light tool. Pt and family acknowledge understanding. Pt discharge to home with family transport.   "

## 2022-10-07 NOTE — PROGRESS NOTES
"Addendum: Hgb 13.1. Okay to discharge.    Viky Avalos PA-C on 10/7/2022 at 12:00 PM      Orthopedic Progress Note      Assessment: 1 Day Post-Op  S/P Procedure(s):  RIGHT SHOULDER ARTHROSCOPY, ACROMIOPLASTY, BICEPS TENOTOMY, CHONDROPLASTY WITH MICROFRACTURE GLENOID     Plan:   - Continue OT.  - Weightbearing status: NWB Right Upper Extremity  - Continue sling & abduction pillow  - Anticoagulation: Xarelto, in addition to SCDs, karyn stockings and early ambulation.  - Discharge planning: Discharge to home today pending OT eval, hgb.    Subjective:  Pain: Left shoulder pain well controlled on Tylenol, oxycodone and aspirin.  Nausea, Vomiting:  No  Chest pain: No  Lightheadedness, Dizziness:  No  Neuro: Denies new numbness or tingling in right upper extremity    Patient is doing well on POD #1. Tolerating oral intake, pain well controlled on oral medications, voiding & ambulating. She is using her IS. Hgb pending.    Objective:  /59 (BP Location: Left arm)   Pulse 78   Temp 97.7  F (36.5  C) (Oral)   Resp 20   Ht 1.676 m (5' 6\")   Wt (!) 167.7 kg (369 lb 11.2 oz)   SpO2 93%   BMI 59.67 kg/m    The patient is A&Ox3. Appears comfortable, sitting up at bedside. Wearing sling.  Sensation is intact to light touch in Right upper extremity, hand & fingers.  Right upper extremity motor strength 5/5 in ulnar, median and radial nerve distributions. Fires axillary. Flexes elbow. Flexes & extends wrist. Full composite fist. Opposes thumb.  Palpable Right radial pulse. Hand warm with brisk cap refill in fingers.  Calves are soft and non-tender.   Right shoulder dressing is covered. Dressing C/D/I.     Pertinent Labs   Lab Results: personally reviewed.   Lab Results   Component Value Date    INR 1.07 11/23/2015    INR 1.04 08/25/2009    INR 1.08 05/06/2009     Lab Results   Component Value Date    WBC 8.2 09/21/2022    HGB 13.0 09/21/2022    HCT 39.8 09/21/2022    MCV 92 09/21/2022     09/21/2022     Lab Results "   Component Value Date     09/21/2022    CO2 27 09/21/2022     Report completed by:  Viky Avalos PA-C  [facility]    Date: 10/7/2022  Time: 8:30 AM

## 2022-10-07 NOTE — PLAN OF CARE
Patient vital signs are at baseline: Yes  Patient able to ambulate as they were prior to admission or with assist devices provided by therapies during their stay:  Yes  Patient MUST void prior to discharge:  Yes  Patient able to tolerate oral intake:  Yes  Pain has adequate pain control using Oral analgesics:  Yes  Does patient have an identified :  Yes  Has goal D/C date and time been discussed with patient:  Yes - Pt's CMS is intact to right arm, only numbness to thumb noted. Dressing CDI. Plan to discharge home today with spouse. Discussed with the pt a transfer to the PT gym at 7 to wait for discharge. She agreed to this plan.

## 2022-10-07 NOTE — PROGRESS NOTES
Care Management Discharge Note    Discharge Date: 10/07/2022     Discharge Disposition:  home    Discharge Services:  none    Discharge Transportation:  Family or friend    Education Provided on the Discharge Plan:  AVS per bedside RN.    Persons Notified of Discharge Plans: patient    Patient/Family in Agreement with the Plan:  yes      Additional Information:  Chart reviewed. Patient discharge per bedside RN.         Radha Chery RN

## 2022-10-07 NOTE — DISCHARGE SUMMARY
ORTHOPEDIC DISCHARGE SUMMARY       Estephania Wong,  1975, MRN [id number]    Admission Date: 10/6/2022      Admission Diagnoses: Biceps tendinitis [M75.20]  Osteoarthritis of AC (acromioclavicular) joint [M19.019]  Shoulder impingement, right [M75.41]  Impingement syndrome of right shoulder [M75.41]     Discharge Date:  10/7/2022    Post-operative Day:  1 Day Post-Op    Reason for Admission: The patient was admitted for the following: Procedure(s):  RIGHT SHOULDER ARTHROSCOPY, ACROMIOPLASTY, BICEPS TENOTOMY, CHONDROPLASTY WITH MICROFRACTURE GLENOID    BRIEF HOSPITAL COURSE   Estephania Wong is a pleasant 46 year old female who underwent the aforementioned procedure with Dr. Magdaleno on 10/6/2022. There were no intraoperative complications and the patient was transferred to the recovery room and later the orthopedic unit in stable condition. Once the patient reached the orthopedic floor our orthopedic pain protocol was implemented along with the following:    Anticoagulation Medications: Xarelto  Therapy: PT and OT  Activity: NWB  Bracing: None    Consultations during Admission: None    COMPLICATIONS/SIGNIFICANT FINDINGS    None    DISCHARGE INFORMATION   Condition at discharge: Good  Discharge destination: Home  Patient was seen by myself on the date of discharge.    FOLLOW UP CARE   Follow up with orthopedics in 2 weeks or sooner should the need arise. Ortho will continue to manage pain control, post op anticoagulation and incision care.     Follow up with your PCP for management of chronic medical problems and to evaluate for post op medical complications including constipation, nausea/vomiting, DVT/PE, anemia, changes in blood pressure, fevers/chills, urinary retention and atelectasis/pneumonia.     Subjective   Patient is doing well on POD #1. Pain is well controlled with oral medications. Ambulating. Tolerating oral intake. Voiding.    Physical Exam   /59 (BP Location: Left arm)   Pulse 78   " Temp 97.7  F (36.5  C) (Oral)   Resp 20   Ht 1.676 m (5' 6\")   Wt (!) 167.7 kg (369 lb 11.2 oz)   SpO2 93%   BMI 59.67 kg/m    The patient is A&Ox3. Appears comfortable, sitting up at bedside. Wearing sling.  Sensation is intact to light touch in Right upper extremity, hand & fingers.  Right upper extremity motor strength 5/5 in ulnar, median and radial nerve distributions. Fires axillary. Flexes elbow. Flexes & extends wrist. Full composite fist. Opposes thumb.  Palpable Right radial pulse. Hand warm with brisk cap refill in fingers.  Calves are soft and non-tender.   Right shoulder dressing is covered. Dressing C/D/I.     Pertinent Results at Discharge     Hemoglobin   Date/Time Value Ref Range Status   09/21/2022 03:17 PM 13.0 11.7 - 15.7 g/dL Final   07/29/2022 04:35 PM 13.4 11.7 - 15.7 g/dL Final   06/28/2019 08:46 PM 12.1 12.0 - 16.0 g/dL Final   11/23/2015 06:40 PM 11.7 11.7 - 15.7 g/dL Final   04/28/2015 01:36 PM 11.5 (A) 11.8 - 15.5 g/dl Final   08/25/2009 03:10 PM 10.6 (L) 11.7 - 15.7 g/dL Final     INR   Date/Time Value Ref Range Status   11/23/2015 06:40 PM 1.07 0.86 - 1.14 Final   08/25/2009 03:10 PM 1.04 0.86 - 1.14 Final   05/06/2009 04:50 AM 1.08 0.86 - 1.14 Final     Platelet Count   Date/Time Value Ref Range Status   09/21/2022 03:17  150 - 450 10e3/uL Final   07/29/2022 04:35  150 - 450 10e3/uL Final   06/28/2019 08:46  140 - 440 thou/uL Final   11/23/2015 06:40  150 - 450 10e9/L Final   04/28/2015 01:36  140 - 450 K/uL Final   08/25/2009 03:10  150 - 450 10e9/L Final       Problem List   Active Problems:    * No active hospital problems. *    Viky Avalos PA-C/Dr. Magdaleno  [facility]  [phone]  Date: 10/7/2022  Time: 8:33 AM    "

## 2022-10-07 NOTE — PROGRESS NOTES
Patient vital signs are at baseline: No,  Reason:  Pt is weaned down to 1 L O2, encouraging IS use.  Patient able to ambulate as they were prior to admission or with assist devices provided by therapies during their stay:  Yes  Patient MUST void prior to discharge:  Yes  Patient able to tolerate oral intake:  Yes  Pain has adequate pain control using Oral analgesics:  Yes  Does patient have an identified :  Yes  Has goal D/C date and time been discussed with patient:  Yes     Plan is to discharge home tomorrow with her .

## 2022-10-13 ENCOUNTER — E-VISIT (OUTPATIENT)
Dept: FAMILY MEDICINE | Facility: CLINIC | Age: 47
End: 2022-10-13
Payer: COMMERCIAL

## 2022-10-13 DIAGNOSIS — N89.8 VAGINAL DISCHARGE: Primary | ICD-10-CM

## 2022-10-13 PROCEDURE — 99207 PR NON-BILLABLE SERV PER CHARTING: CPT | Performed by: FAMILY MEDICINE

## 2022-10-14 NOTE — PATIENT INSTRUCTIONS
I have asked my staff to reach out to schedule a visit so we can appropriately evaluate this concern.   I can add you on to the end of my schedule tomorrow.  Damaris Son, DO

## 2022-10-14 NOTE — TELEPHONE ENCOUNTER
Provider E-Visit time total (minutes): n/a    Please schedule Naheed at the end of my day tomorrow (Friday 10/14) for exam and testing. Okay to double book at the end of the day.    Damaris Son, DO

## 2022-10-21 ENCOUNTER — TRANSFERRED RECORDS (OUTPATIENT)
Dept: HEALTH INFORMATION MANAGEMENT | Facility: CLINIC | Age: 47
End: 2022-10-21

## 2022-10-25 ENCOUNTER — TRANSFERRED RECORDS (OUTPATIENT)
Dept: HEALTH INFORMATION MANAGEMENT | Facility: CLINIC | Age: 47
End: 2022-10-25

## 2022-11-18 ENCOUNTER — TRANSFERRED RECORDS (OUTPATIENT)
Dept: HEALTH INFORMATION MANAGEMENT | Facility: CLINIC | Age: 47
End: 2022-11-18

## 2022-11-29 ENCOUNTER — TRANSFERRED RECORDS (OUTPATIENT)
Dept: HEALTH INFORMATION MANAGEMENT | Facility: CLINIC | Age: 47
End: 2022-11-29

## 2022-12-05 ENCOUNTER — TRANSFERRED RECORDS (OUTPATIENT)
Dept: HEALTH INFORMATION MANAGEMENT | Facility: CLINIC | Age: 47
End: 2022-12-05

## 2022-12-07 ENCOUNTER — MYC MEDICAL ADVICE (OUTPATIENT)
Dept: FAMILY MEDICINE | Facility: CLINIC | Age: 47
End: 2022-12-07

## 2022-12-13 ENCOUNTER — TRANSFERRED RECORDS (OUTPATIENT)
Dept: HEALTH INFORMATION MANAGEMENT | Facility: CLINIC | Age: 47
End: 2022-12-13

## 2022-12-18 DIAGNOSIS — G25.81 RESTLESS LEGS SYNDROME (RLS): ICD-10-CM

## 2022-12-18 DIAGNOSIS — Z79.899 CONTROLLED SUBSTANCE AGREEMENT SIGNED: ICD-10-CM

## 2022-12-18 DIAGNOSIS — G47.00 INSOMNIA, UNSPECIFIED TYPE: ICD-10-CM

## 2022-12-19 ENCOUNTER — VIRTUAL VISIT (OUTPATIENT)
Dept: FAMILY MEDICINE | Facility: CLINIC | Age: 47
End: 2022-12-19
Payer: COMMERCIAL

## 2022-12-19 DIAGNOSIS — U07.1 INFECTION DUE TO 2019 NOVEL CORONAVIRUS: Primary | ICD-10-CM

## 2022-12-19 PROCEDURE — 99213 OFFICE O/P EST LOW 20 MIN: CPT | Mod: 95 | Performed by: NURSE PRACTITIONER

## 2022-12-19 RX ORDER — CLONAZEPAM 1 MG/1
TABLET ORAL
Qty: 30 TABLET | Refills: 0 | Status: SHIPPED | OUTPATIENT
Start: 2022-12-19 | End: 2023-01-23

## 2022-12-19 NOTE — PROGRESS NOTES
Naheed is a 46 year old who is being evaluated via a billable video visit.      How would you like to obtain your AVS? MyChart  If the video visit is dropped, the invitation should be resent by: Text to cell phone: [phone]  Will anyone else be joining your video visit? No        Assessment & Plan       Infection due to 2019 novel coronavirus  Tolerating symptoms well.  Discussed options.  Wishes to hold clonazepam and ambien for 8 days while taking paxlovid.  Concerned with previous pneumonia.  - nirmatrelvir and ritonavir (PAXLOVID) therapy pack; Take 3 tablets by mouth 2 times daily for 5 days (Take 2 Nirmatrelvir tablets and 1 Ritonavir tablet twice daily for 5 days)            No follow-ups on file.    HILDA France Ra, CNP  [facility]    Subjective   Naheed is a 46 year old, presenting for the following health issues:  Covid Concern      HPI       COVID-19 Symptom Review  How many days ago did these symptoms start? 12/18/2022    Are any of the following symptoms significant for you?    New or worsening difficulty breathing? No    Worsening cough? Yes, I am coughing up mucus.    Fever or chills? Yes, I felt feverish or had chills.    Headache: YES    Sore throat: No    Chest pain: No    Diarrhea: No    Body aches? YES    What treatments has patient tried? Advil   Does patient live in a nursing home, group home, or shelter? No  Does patient have a way to get food/medications during quarantined? Yes, I have a friend or family member who can help me.            Review of Systems   Constitutional, HEENT, cardiovascular, pulmonary, gi and gu systems are negative, except as otherwise noted.      Objective           Vitals:  No vitals were obtained today due to virtual visit.    Physical Exam   GENERAL: Healthy, alert and no distress  EYES: Eyes grossly normal to inspection.  No discharge or erythema, or obvious scleral/conjunctival abnormalities.  RESP: No audible wheeze, cough, or  visible cyanosis.  No visible retractions or increased work of breathing.    SKIN: Visible skin clear. No significant rash, abnormal pigmentation or lesions.  NEURO: Cranial nerves grossly intact.  Mentation and speech appropriate for age.  PSYCH: Mentation appears normal, affect normal/bright, judgement and insight intact, normal speech and appearance well-groomed.        .        Video-Visit Details    Video Start Time: 1:52 PM    Type of service:  Video Visit    Video End Time:2:04 PM    Originating Location (pt. Location): Home    Distant Location (provider location):  On-site    Platform used for Video Visit: Chitra

## 2022-12-19 NOTE — PATIENT INSTRUCTIONS
Hold your clonazepam and ambien for 8 days.  COVID-19 Outpatient Treatments  Your care team can help you find the best treatments for COVID-19. Talk to a health care provider or refer to the FDA medicine fact sheets below.  Important: You can't have Paxlovid or molnupiravir if you're starting the medicine more than 5 days after your symptoms have started.  Paxlovid: https://www.fda.gov/media/341757/download  Molnupiravir (Lagevrio): https://www.fda.gov/media/971586/download  Paxlovid (nimatrelvir and ritonavir)  How it works  Two medicines (nirmatrelvir and ritonavir) are taken together. They stop the virus from growing. Less amount of virus is easier for your body to fight.  Benefits  Lowers risk of a hospital stay or death from COVID-19.  How to take  Medicine comes in a daily container with both medicine tablets. Take by mouth twice daily (once in the morning, once at night) for 5 days.  The number of tablets to take varies by patient.  Don't chew or break capsules. Swallow whole.  When to take  Take as soon as possible after positive COVID-19 test result, and within 5 days of your first symptoms.  Who can take it  Patients must be 12 years or older, weigh at least 88 pounds, and have tested positive for COVID-19. Paxlovid is the preferred treatment for pregnant patients.  Possible side effects  Can cause altered sense of taste, diarrhea (loose, watery stools), high blood pressure, muscle aches.  Medicine conflicts  Some medicines may conflict with Paxlovid and may cause serious side effects.  Tell your care team about all the medicines you take, including prescription and over-the-counter medicines, vitamins, and herbal supplements.  Your care team will review your medicines to make sure that you can safely take Paxlovid.  Cautions  Paxlovid is not advised for patients with severe kidney or liver disease. If you have kidney or liver problems, the dose may need to be changed.  If you're pregnant or breastfeeding,  talk to your care team about your options.  If you take hormonal birth control (such as the Pill), then you or your partner should also use a non-hormonal form of birth control (such as a condom). Keep doing this for 1 menstrual cycle after your last dose of Paxlovid.  Molnupiravir (Lagevrio)  How it works  Stops the virus from growing. Less amount of virus is easier for your body to fight.  Benefits  Lowers risk of a hospital stay or death from COVID-19.  How to take  Take 4 capsules by mouth every 12 hours (4 in the morning and 4 at night) for 5 days. Don't chew or break capsules. Swallow whole.  When to take  Take as soon as possible after positive COVID-19 test result, and within 5 days of your first symptoms.  Who can take it  Patients must be 18 years or older and have tested positive for COVID-19.  Possible side effects  Diarrhea (loose, watery stools), nausea (feeling sick to your stomach), dizziness, headaches.  Medicine conflicts  Right now, there are no known conflicts with other drugs. But tell your care team about all medicines you take.  Cautions  This medicine is not advised for patients who are pregnant.  If you are someone who could become pregnant, use trusted birth control until 4 days after your last dose of molnupiravir.  If your partner could become pregnant, you should use trusted birth control until 3 months after your last dose of molnupiravir.  For informational purposes only. Not to replace the advice of your health care provider. Copyright   2022 [facility], [place]. All rights reserved. Clinically reviewed by Jeni Pa, PharmD, BCACP. DOZ [id number] - REV 12/22.

## 2022-12-19 NOTE — TELEPHONE ENCOUNTER
Routing refill request to provider for review/approval because:  Controlled substance request    Last Written Prescription Date:  11/1/22  Last Fill Quantity: 30,  # refills: 0   Last office visit provider:  9/21/22     Requested Prescriptions   Pending Prescriptions Disp Refills     clonazePAM (KLONOPIN) 1 MG tablet [Pharmacy Med Name: CLONAZEPAM 1MG TABS] 30 tablet 0     Sig: TAKE ONE TABLET BY MOUTH EVERY NIGHT AT BEDTIME       There is no refill protocol information for this order          Martin Rutherford RN 12/19/22 12:38 PM

## 2022-12-20 NOTE — TELEPHONE ENCOUNTER
1. Insomnia, unspecified type  2. Controlled substance agreement signed - 7/29/22  3. Restless legs syndrome (RLS)  - clonazePAM (KLONOPIN) 1 MG tablet; TAKE ONE TABLET BY MOUTH EVERY NIGHT AT BEDTIME  Dispense: 30 tablet; Refill: 0    Reviewed [place] . Due for refill. Prescription sent to the pharmacy.    Damaris Son, DO

## 2023-01-03 ENCOUNTER — E-VISIT (OUTPATIENT)
Dept: URGENT CARE | Facility: CLINIC | Age: 48
End: 2023-01-03
Payer: COMMERCIAL

## 2023-01-03 DIAGNOSIS — J01.90 ACUTE BACTERIAL SINUSITIS: Primary | ICD-10-CM

## 2023-01-03 DIAGNOSIS — B96.89 ACUTE BACTERIAL SINUSITIS: Primary | ICD-10-CM

## 2023-01-03 PROCEDURE — 99421 OL DIG E/M SVC 5-10 MIN: CPT | Performed by: EMERGENCY MEDICINE

## 2023-01-03 NOTE — PATIENT INSTRUCTIONS
Sinusitis (Antibiotic Treatment)    The sinuses are air-filled spaces within the bones of the face. They connect to the inside of the nose. Sinusitis is an inflammation of the tissue that lines the sinuses. Sinusitis can occur during a cold. It can also happen due to allergies to pollens and other particles in the air. Sinusitis can cause symptoms of sinus congestion and a feeling of fullness. A sinus infection causes fever, headache, and facial pain. There is often green or yellow fluid draining from the nose or into the back of the throat (post-nasal drip). You have been given antibiotics to treat this condition.   Home care    Take the full course of antibiotics as instructed. Don't stop taking them, even when you feel better.    Drink plenty of water, hot tea, and other liquids as directed by the healthcare provider. This may help thin nasal mucus. It also may help your sinuses drain fluids.    Heat may help soothe painful areas of your face. Use a towel soaked in hot water. Or,  the shower and direct the warm spray onto your face. Using a vaporizer along with a menthol rub at night may also help soothe symptoms.     An expectorant with guaifenesin may help thin nasal mucus and help your sinuses drain fluids. Talk with your provider or pharmacists before taking an over-the-counter (OTC) medicine if you have any questions about it or its side effects..    You can use an OTC decongestant, unless a similar medicine was prescribed to you. Nasal sprays work the fastest. Use one that contains phenylephrine or oxymetazoline. First blow your nose gently. Then use the spray. Don't use these medicines more often than directed on the label. If you do, your symptoms may get worse. You may also take pills that contain pseudoephedrine. Don t use products that combine multiple medicines. This is because side effects may be increased. Read labels. You can also ask the pharmacist for help. (People with high blood  pressure should not use decongestants. They can raise blood pressure.) Talk with your provider or pharmacist if you have any questions about the medicine..    OTC antihistamines may help if allergies contributed to your sinusitis. Talk with your provider or pharmacist if you have any questions about the medicine..    Don't use nasal rinses or irrigation during an acute sinus infection, unless your healthcare provider tells you to. Rinsing may spread the infection to other areas in your sinuses.    Use acetaminophen or ibuprofen to control pain, unless another pain medicine was prescribed to you. If you have chronic liver or kidney disease or ever had a stomach ulcer, talk with your healthcare provider before using these medicines. Never give aspirin to anyone under age 18 who is ill with a fever. It may cause severe liver damage.    Don't smoke. This can make symptoms worse.    Follow-up care  Follow up with your healthcare provider, or as advised.   When to seek medical advice  Call your healthcare provider if any of these occur:     Facial pain or headache that gets worse    Stiff neck    Unusual drowsiness or confusion    Swelling of your forehead or eyelids    Symptoms don't go away in 10 days    Vision problems, such as blurred or double vision    Fever of 100.4 F (38 C) or higher, or as directed by your healthcare provider  Call 911  Call 911 if any of these occur:     Seizure    Trouble breathing    Feeling dizzy or faint    Fingernails, skin or lips look blue, purple , or gray  Prevention  Here are steps you can take to help prevent an infection:     Keep good hand washing habits.    Don t have close contact with people who have sore throats, colds, or other upper respiratory infections.    Don t smoke, and stay away from secondhand smoke.    Stay up to date with of your vaccines.  [employer] last reviewed this educational content on 12/1/2019 2000-2021 [employer]. All rights reserved. This  information is not intended as a substitute for professional medical care. Always follow your healthcare professional's instructions.        Dear Estephania Wong      Based on your responses and diagnosis, I have prescribed augmentin to treat your symptoms. I have sent this to your pharmacy.?     It is also important to stay well hydrated, get lots of rest and take over-the-counter decongestants,?tylenol?or ibuprofen if you?are able to?take those medications per your primary care provider to help relieve discomfort.?     It is important that you take?all of?your prescribed medication even if your symptoms are improving after a few doses.? Taking?all of?your medicine helps prevent the symptoms from returning.?     If your symptoms worsen, you develop severe headache, vomiting, high fever (>102), or are not improving in 7 days, please contact your primary care provider for an appointment or visit any of our convenient Walk-in Care or Urgent Care Centers to be seen which can be found on our website?here.?     Thanks again for choosing?us?as your health care partner,?   ?  Scott Ward MD?

## 2023-01-30 DIAGNOSIS — G47.00 INSOMNIA, UNSPECIFIED TYPE: ICD-10-CM

## 2023-01-30 RX ORDER — ZOLPIDEM TARTRATE 5 MG/1
TABLET ORAL
Qty: 30 TABLET | Refills: 0 | Status: SHIPPED | OUTPATIENT
Start: 2023-01-30 | End: 2023-03-29

## 2023-02-02 ENCOUNTER — TRANSFERRED RECORDS (OUTPATIENT)
Dept: HEALTH INFORMATION MANAGEMENT | Facility: CLINIC | Age: 48
End: 2023-02-02

## 2023-02-21 DIAGNOSIS — G47.00 INSOMNIA, UNSPECIFIED TYPE: ICD-10-CM

## 2023-02-21 DIAGNOSIS — Z79.899 CONTROLLED SUBSTANCE AGREEMENT SIGNED: ICD-10-CM

## 2023-02-21 DIAGNOSIS — G25.81 RESTLESS LEGS SYNDROME (RLS): ICD-10-CM

## 2023-02-22 RX ORDER — CLONAZEPAM 1 MG/1
TABLET ORAL
Qty: 30 TABLET | Refills: 0 | Status: SHIPPED | OUTPATIENT
Start: 2023-02-22 | End: 2023-03-21

## 2023-02-22 NOTE — TELEPHONE ENCOUNTER
1. Insomnia, unspecified type  2. Controlled substance agreement signed - 7/29/22  3. Restless legs syndrome (RLS)  - clonazePAM (KLONOPIN) 1 MG tablet; TAKE ONE TABLET BY MOUTH EVERY NIGHT AT BEDTIME  Dispense: 30 tablet; Refill: 0     Reviewed [place] .  She is due for refill.  Prescription sent to pharmacy.  Coming due for med check in March, is scheduled for early April for med check.    Damaris Son, DO

## 2023-03-29 RX ORDER — HYDROCHLOROTHIAZIDE 50 MG/1
50 TABLET ORAL DAILY
OUTPATIENT
Start: 2023-03-29

## 2023-03-29 NOTE — TELEPHONE ENCOUNTER
"Routing refill request to provider for review/approval because:  Medication is reported/historical  No PCP listed    Last Written Prescription Date:  6/10/2022  Last Fill Quantity: n/a,  # refills: n/a   Last office visit provider:  12/19/2022     Requested Prescriptions   Pending Prescriptions Disp Refills     hydrochlorothiazide (HYDRODIURIL) 50 MG tablet       Sig: Take 1 tablet (50 mg) by mouth daily       Diuretics (Including Combos) Protocol Passed - 3/27/2023  3:12 PM        Passed - Blood pressure under 140/90 in past 12 months     BP Readings from Last 3 Encounters:   10/07/22 116/59   09/21/22 128/84   07/29/22 139/85                 Passed - Recent (12 mo) or future (30 days) visit within the authorizing provider's specialty     Patient has had an office visit with the authorizing provider or a provider within the authorizing providers department within the previous 12 mos or has a future within next 30 days. See \"Patient Info\" tab in inbasket, or \"Choose Columns\" in Meds & Orders section of the refill encounter.              Passed - Medication is active on med list        Passed - Patient is age 18 or older        Passed - No active pregancy on record        Passed - Normal serum creatinine on file in past 12 months     Recent Labs   Lab Test 09/21/22  1517   CR 0.90              Passed - Normal serum potassium on file in past 12 months     Recent Labs   Lab Test 09/21/22  1517   POTASSIUM 3.7                    Passed - Normal serum sodium on file in past 12 months     Recent Labs   Lab Test 09/21/22  1517                 Passed - No positive pregnancy test in past 12 months             Xochitl Gonzales RN 03/28/23 9:28 PM  "

## 2023-04-11 ENCOUNTER — TRANSFERRED RECORDS (OUTPATIENT)
Dept: HEALTH INFORMATION MANAGEMENT | Facility: CLINIC | Age: 48
End: 2023-04-11

## 2023-04-11 ENCOUNTER — OFFICE VISIT (OUTPATIENT)
Dept: FAMILY MEDICINE | Facility: CLINIC | Age: 48
End: 2023-04-11
Payer: COMMERCIAL

## 2023-04-11 VITALS
HEIGHT: 66 IN | HEART RATE: 64 BPM | OXYGEN SATURATION: 96 % | TEMPERATURE: 98.5 F | WEIGHT: 293 LBS | BODY MASS INDEX: 47.09 KG/M2 | RESPIRATION RATE: 20 BRPM | DIASTOLIC BLOOD PRESSURE: 78 MMHG | SYSTOLIC BLOOD PRESSURE: 120 MMHG

## 2023-04-11 DIAGNOSIS — G47.00 INSOMNIA, UNSPECIFIED TYPE: ICD-10-CM

## 2023-04-11 DIAGNOSIS — E66.01 MORBID OBESITY (H): ICD-10-CM

## 2023-04-11 DIAGNOSIS — M79.2 NEUROPATHIC PAIN: ICD-10-CM

## 2023-04-11 DIAGNOSIS — Z79.899 CONTROLLED SUBSTANCE AGREEMENT SIGNED: Primary | ICD-10-CM

## 2023-04-11 DIAGNOSIS — L65.8 FEMALE PATTERN HAIR LOSS: ICD-10-CM

## 2023-04-11 DIAGNOSIS — G25.81 RESTLESS LEGS SYNDROME (RLS): ICD-10-CM

## 2023-04-11 PROBLEM — I26.99 PULMONARY EMBOLISM (H): Status: ACTIVE | Noted: 2021-10-27

## 2023-04-11 PROBLEM — Z86.711 HISTORY OF PULMONARY EMBOLISM: Status: ACTIVE | Noted: 2023-04-11

## 2023-04-11 PROBLEM — I26.99 PULMONARY EMBOLISM (H): Status: RESOLVED | Noted: 2021-10-27 | Resolved: 2023-04-11

## 2023-04-11 PROCEDURE — 99214 OFFICE O/P EST MOD 30 MIN: CPT | Performed by: FAMILY MEDICINE

## 2023-04-11 RX ORDER — SPIRONOLACTONE 50 MG/1
TABLET, FILM COATED ORAL
Qty: 210 TABLET | Refills: 0 | Status: SHIPPED | OUTPATIENT
Start: 2023-04-11 | End: 2023-08-09

## 2023-04-11 RX ORDER — NALTREXONE HYDROCHLORIDE 50 MG/1
TABLET, FILM COATED ORAL
COMMUNITY
Start: 2023-04-02 | End: 2024-08-15

## 2023-04-11 ASSESSMENT — ANXIETY QUESTIONNAIRES
GAD7 TOTAL SCORE: 3
GAD7 TOTAL SCORE: 3
IF YOU CHECKED OFF ANY PROBLEMS ON THIS QUESTIONNAIRE, HOW DIFFICULT HAVE THESE PROBLEMS MADE IT FOR YOU TO DO YOUR WORK, TAKE CARE OF THINGS AT HOME, OR GET ALONG WITH OTHER PEOPLE: SOMEWHAT DIFFICULT
5. BEING SO RESTLESS THAT IT IS HARD TO SIT STILL: SEVERAL DAYS
2. NOT BEING ABLE TO STOP OR CONTROL WORRYING: NOT AT ALL
6. BECOMING EASILY ANNOYED OR IRRITABLE: NOT AT ALL
4. TROUBLE RELAXING: SEVERAL DAYS
7. FEELING AFRAID AS IF SOMETHING AWFUL MIGHT HAPPEN: NOT AT ALL
GAD7 TOTAL SCORE: 3
3. WORRYING TOO MUCH ABOUT DIFFERENT THINGS: NOT AT ALL
1. FEELING NERVOUS, ANXIOUS, OR ON EDGE: SEVERAL DAYS
7. FEELING AFRAID AS IF SOMETHING AWFUL MIGHT HAPPEN: NOT AT ALL
8. IF YOU CHECKED OFF ANY PROBLEMS, HOW DIFFICULT HAVE THESE MADE IT FOR YOU TO DO YOUR WORK, TAKE CARE OF THINGS AT HOME, OR GET ALONG WITH OTHER PEOPLE?: SOMEWHAT DIFFICULT

## 2023-04-11 ASSESSMENT — PATIENT HEALTH QUESTIONNAIRE - PHQ9
SUM OF ALL RESPONSES TO PHQ QUESTIONS 1-9: 5
10. IF YOU CHECKED OFF ANY PROBLEMS, HOW DIFFICULT HAVE THESE PROBLEMS MADE IT FOR YOU TO DO YOUR WORK, TAKE CARE OF THINGS AT HOME, OR GET ALONG WITH OTHER PEOPLE: SOMEWHAT DIFFICULT
SUM OF ALL RESPONSES TO PHQ QUESTIONS 1-9: 5

## 2023-04-11 NOTE — PROGRESS NOTES
Assessment & Plan     1. Controlled substance agreement signed - 7/29/22  2. Insomnia, unspecified type  3. Neuropathic pain  4. Restless legs syndrome (RLS)  Here today for controlled substance med check.  Doing well on Lyrica, clonazepam, and zolpidem.  Continue current treatment plan at current doses.  We will renew controlled substance agreement at upcoming physical.    5. Morbid obesity (H)  BMI of 62 and comorbidities including hyperlipidemia and subclinical hypothyroidism and obstructive sleep apnea.  Currently working with a weight loss program, on Wellbutrin and adding naltrexone.  Reviewed healthy habits and weight loss strategies through exercise and calorie deficit, prioritizing protein intake.  If not having success with the program she is currently working with, we can place referral to work with someone else within the system.    6. Female pattern hair loss  - TSH with free T4 reflex; Future  - Ferritin; Future  - spironolactone (ALDACTONE) 50 MG tablet; Take 1 tablet (50 mg) by mouth daily for 30 days, THEN 1 tablet (50 mg) 2 times daily for 90 days.  Dispense: 210 tablet; Refill: 0  - minoxidil (ROGAINE) 2 % external solution; Apply topically 2 times daily  Dispense: 60 mL; Refill: 2      Symptoms worse after lots of stress and recent COVID illness, could be contributing and may have pushed her into telogen effluvium phase.  However she does have more focal hair loss on the top of her head.  We will rule out thyroid disease and rule out iron deficiency.  Plan to start spironolactone as well as a topical minoxidil combination.  If symptoms not adequately responding, we will review the pros and cons of a trial of oral minoxidil.    Damaris Son, [facility]    Adolph Farfan is a 47 year old, presenting for the following health issues:  Recheck Medication        4/11/2023    12:55 PM   Additional Questions   Roomed by Ivette GALLARDO CMA   Accompanied by           4/11/2023    12:55 PM   Patient Reported Additional Medications   Patient reports taking the following new medications Naltrexone     History of Present Illness       Reason for visit:  Unsure. I was called a couple months ago stating I needed a follow-up appt. I don't remember what it was for?She exercises with enough effort to increase her heart rate 9 or less minutes per day.  She exercises with enough effort to increase her heart rate 3 or less days per week.   She is taking medications regularly.    Today's PHQ-9         PHQ-9 Total Score: 5    PHQ-9 Q9 Thoughts of better off dead/self-harm past 2 weeks :   Not at all    How difficult have these problems made it for you to do your work, take care of things at home, or get along with other people: Somewhat difficult  Today's CHANCE-7 Score: 3    Scheduled for controlled substance med check.   Sleeping fine, 90% good. Was only sleeping 3 ours for years.   Effexor for fibromyalgia is helping, but notices food/activites are unchanged but weight is up.  Restless legs also doing okay, addition of requip helping.  Seeing [facility].     LYRICA: Prescribed by neuro or pain.     AMBIEN: On 5mg at bedtime.      CLONAZEPAM: Continues 1mg at bedtime.     Does endorse hair loss.          7/29/2022     5:03 PM 4/11/2023    12:29 PM   PHQ   PHQ-9 Total Score 6 5   Q9: Thoughts of better off dead/self-harm past 2 weeks Not at all Not at all         7/29/2022     5:03 PM 4/11/2023    12:30 PM   CHANCE-7 SCORE   Total Score  3 (minimal anxiety)   Total Score 3 3      Mood doing okay, lost dad last year, surgery in the fall, fell with a concussion, then covid with headache, rebound covid. Trying to catch up with work.     OBESITY: Starting naltrexone in addition to wellbutrin. Hoping to get outside and be more active. Seeing [facility]. Has some coaches to help with dietary modifications.     HX PE: provoked - post op with OCPs.      Review of Systems   See  "HPI above.       Objective    /78 (BP Location: Left arm, Patient Position: Sitting, Cuff Size: Adult Large)   Pulse 64   Temp 98.5  F (36.9  C) (Oral)   Resp 20   Ht 1.676 m (5' 6\")   Wt (!) 174.4 kg (384 lb 8 oz)   LMP  (LMP Unknown)   SpO2 96%   BMI 62.06 kg/m    Body mass index is 62.06 kg/m .  Physical Exam   GENERAL: Naheed is a pleasant, morbidly obese female, no acute distress.  Appears younger than stated age  RESP: lungs clear to auscultation - no rales, rhonchi or wheezes  CV: regular rate and rhythm, normal S1 S2, no S3 or S4, no murmur, click or rub, no peripheral edema and peripheral pulses strong  SKIN: Hair thinning on top of head, no focal alopecia.                "

## 2023-04-18 ENCOUNTER — TRANSFERRED RECORDS (OUTPATIENT)
Dept: HEALTH INFORMATION MANAGEMENT | Facility: CLINIC | Age: 48
End: 2023-04-18
Payer: COMMERCIAL

## 2023-04-27 ENCOUNTER — TRANSFERRED RECORDS (OUTPATIENT)
Dept: HEALTH INFORMATION MANAGEMENT | Facility: CLINIC | Age: 48
End: 2023-04-27
Payer: COMMERCIAL

## 2023-05-15 ENCOUNTER — TRANSFERRED RECORDS (OUTPATIENT)
Dept: HEALTH INFORMATION MANAGEMENT | Facility: CLINIC | Age: 48
End: 2023-05-15
Payer: COMMERCIAL

## 2023-05-18 ENCOUNTER — TRANSFERRED RECORDS (OUTPATIENT)
Dept: HEALTH INFORMATION MANAGEMENT | Facility: CLINIC | Age: 48
End: 2023-05-18
Payer: COMMERCIAL

## 2023-05-28 DIAGNOSIS — G47.00 INSOMNIA, UNSPECIFIED TYPE: ICD-10-CM

## 2023-05-30 RX ORDER — ZOLPIDEM TARTRATE 5 MG/1
TABLET ORAL
Qty: 30 TABLET | Refills: 0 | Status: SHIPPED | OUTPATIENT
Start: 2023-05-30 | End: 2023-06-27

## 2023-05-30 NOTE — TELEPHONE ENCOUNTER
1. Insomnia, unspecified type  - zolpidem (AMBIEN) 5 MG tablet; TAKE ONE TABLET BY MOUTH EVERY NIGHT AT BEDTIME AS NEEDED FOR SLEEP  Dispense: 30 tablet; Refill: 0     Reviewed [place] . Due for refill. CSA and med check up to date. Refill sent to pharmacy.    Damaris Son, DO

## 2023-07-10 ENCOUNTER — PATIENT OUTREACH (OUTPATIENT)
Dept: CARE COORDINATION | Facility: CLINIC | Age: 48
End: 2023-07-10
Payer: COMMERCIAL

## 2023-07-10 DIAGNOSIS — E66.01 MORBID OBESITY (H): ICD-10-CM

## 2023-07-10 RX ORDER — BUPROPION HYDROCHLORIDE 150 MG/1
TABLET ORAL
Qty: 90 TABLET | Refills: 2 | Status: SHIPPED | OUTPATIENT
Start: 2023-07-10 | End: 2023-08-23

## 2023-07-10 NOTE — TELEPHONE ENCOUNTER
"Last Written Prescription Date:  7/29/2022  Last Fill Quantity: 90,  # refills: 3   Last office visit provider:  4/11/2023     Requested Prescriptions   Pending Prescriptions Disp Refills     buPROPion (WELLBUTRIN XL) 150 MG 24 hr tablet [Pharmacy Med Name: BUPROPION HCL ER (XL) 150MG TB24] 90 tablet 3     Sig: TAKE ONE TABLET BY MOUTH EVERY MORNING       SSRIs Protocol Passed - 7/10/2023  2:58 PM        Passed - Recent (12 mo) or future (30 days) visit within the authorizing provider's specialty     Patient has had an office visit with the authorizing provider or a provider within the authorizing providers department within the previous 12 mos or has a future within next 30 days. See \"Patient Info\" tab in inbasket, or \"Choose Columns\" in Meds & Orders section of the refill encounter.              Passed - Medication is Bupropion     If the medication is Bupropion (Wellbutrin), and the patient is taking for smoking cessation; OK to refill.          Passed - Medication is active on med list        Passed - Patient is age 18 or older        Passed - No active pregnancy on record        Passed - No positive pregnancy test in last 12 months             Jolie Leonard RN 07/10/23 4:20 PM  "

## 2023-07-23 DIAGNOSIS — G25.81 RESTLESS LEGS SYNDROME (RLS): ICD-10-CM

## 2023-07-23 DIAGNOSIS — E66.01 MORBID OBESITY (H): ICD-10-CM

## 2023-07-23 DIAGNOSIS — Z79.899 CONTROLLED SUBSTANCE AGREEMENT SIGNED: ICD-10-CM

## 2023-07-23 DIAGNOSIS — G47.00 INSOMNIA, UNSPECIFIED TYPE: ICD-10-CM

## 2023-07-23 RX ORDER — BUPROPION HYDROCHLORIDE 150 MG/1
TABLET ORAL
Qty: 90 TABLET | Refills: 3 | OUTPATIENT
Start: 2023-07-23

## 2023-07-24 RX ORDER — CLONAZEPAM 1 MG/1
TABLET ORAL
Qty: 30 TABLET | Refills: 0 | Status: SHIPPED | OUTPATIENT
Start: 2023-07-24 | End: 2023-08-22

## 2023-07-24 RX ORDER — ZOLPIDEM TARTRATE 5 MG/1
TABLET ORAL
Qty: 30 TABLET | Refills: 0 | Status: SHIPPED | OUTPATIENT
Start: 2023-07-24 | End: 2023-08-22

## 2023-07-24 NOTE — TELEPHONE ENCOUNTER
"klonopin  Routing refill request to provider for review/approval because:  Drug not on the [facility] refill protocol     Last Written Prescription Date:  6/27/2023  Last Fill Quantity: 30,  # refills: 0  Last office visit provider:   4/11/2023      ambien  Routing refill request to provider for review/approval because:  Drug not on the [facility] refill protocol     Last Written Prescription Date:  6/27/2023  Last Fill Quantity: 30,  # refills: 0   Last office visit provider:  4/11/2023   Requested Prescriptions   Pending Prescriptions Disp Refills    clonazePAM (KLONOPIN) 1 MG tablet [Pharmacy Med Name: CLONAZEPAM 1MG TABS] 30 tablet 0     Sig: TAKE ONE TABLET BY MOUTH EVERY NIGHT AT BEDTIME       There is no refill protocol information for this order       zolpidem (AMBIEN) 5 MG tablet [Pharmacy Med Name: ZOLPIDEM TARTRATE 5MG TABS] 30 tablet 0     Sig: TAKE ONE TABLET BY MOUTH EVERY NIGHT AT BEDTIME AS NEEDED FOR SLEEP       There is no refill protocol information for this order      Refused Prescriptions Disp Refills    buPROPion (WELLBUTRIN XL) 150 MG 24 hr tablet [Pharmacy Med Name: BUPROPION HCL ER (XL) 150MG TB24] 90 tablet 3     Sig: TAKE ONE TABLET BY MOUTH EVERY MORNING       SSRIs Protocol Passed - 7/23/2023 11:01 PM        Passed - Recent (12 mo) or future (30 days) visit within the authorizing provider's specialty     Patient has had an office visit with the authorizing provider or a provider within the authorizing providers department within the previous 12 mos or has a future within next 30 days. See \"Patient Info\" tab in inbasket, or \"Choose Columns\" in Meds & Orders section of the refill encounter.              Passed - Medication is Bupropion     If the medication is Bupropion (Wellbutrin), and the patient is taking for smoking cessation; OK to refill.          Passed - Medication is active on med list        Passed - Patient is age 18 or older        Passed - No active pregnancy on record        Passed - No " positive pregnancy test in last 12 months             Jolie Leonard, RN 07/23/23 11:51 PM

## 2023-08-02 ASSESSMENT — ENCOUNTER SYMPTOMS
HEMATOCHEZIA: 0
SORE THROAT: 0
HEARTBURN: 1
HEADACHES: 1
ARTHRALGIAS: 1
FEVER: 0
DYSURIA: 0
ABDOMINAL PAIN: 0
EYE PAIN: 0
CONSTIPATION: 0
COUGH: 0
PALPITATIONS: 0
MYALGIAS: 0
NAUSEA: 1
CHILLS: 0
FREQUENCY: 0
JOINT SWELLING: 0
HEMATURIA: 0
DIZZINESS: 0
SHORTNESS OF BREATH: 0
NERVOUS/ANXIOUS: 0
WEAKNESS: 0
BREAST MASS: 0
DIARRHEA: 0
PARESTHESIAS: 0

## 2023-08-07 ENCOUNTER — PATIENT OUTREACH (OUTPATIENT)
Dept: CARE COORDINATION | Facility: CLINIC | Age: 48
End: 2023-08-07
Payer: COMMERCIAL

## 2023-08-09 ENCOUNTER — OFFICE VISIT (OUTPATIENT)
Dept: FAMILY MEDICINE | Facility: CLINIC | Age: 48
End: 2023-08-09
Payer: COMMERCIAL

## 2023-08-09 ENCOUNTER — LAB (OUTPATIENT)
Dept: FAMILY MEDICINE | Facility: CLINIC | Age: 48
End: 2023-08-09

## 2023-08-09 VITALS
RESPIRATION RATE: 16 BRPM | BODY MASS INDEX: 47.09 KG/M2 | HEART RATE: 76 BPM | SYSTOLIC BLOOD PRESSURE: 134 MMHG | HEIGHT: 66 IN | OXYGEN SATURATION: 97 % | WEIGHT: 293 LBS | TEMPERATURE: 98.4 F | DIASTOLIC BLOOD PRESSURE: 84 MMHG

## 2023-08-09 DIAGNOSIS — R23.3 EASY BRUISING: ICD-10-CM

## 2023-08-09 DIAGNOSIS — Z12.31 VISIT FOR SCREENING MAMMOGRAM: ICD-10-CM

## 2023-08-09 DIAGNOSIS — Z12.11 SCREEN FOR COLON CANCER: ICD-10-CM

## 2023-08-09 DIAGNOSIS — G89.29 OTHER CHRONIC PAIN: ICD-10-CM

## 2023-08-09 DIAGNOSIS — G43.909 MIGRAINE WITHOUT STATUS MIGRAINOSUS, NOT INTRACTABLE, UNSPECIFIED MIGRAINE TYPE: ICD-10-CM

## 2023-08-09 DIAGNOSIS — E66.01 MORBID OBESITY (H): ICD-10-CM

## 2023-08-09 DIAGNOSIS — L65.8 FEMALE PATTERN HAIR LOSS: ICD-10-CM

## 2023-08-09 DIAGNOSIS — F43.21 GRIEF: ICD-10-CM

## 2023-08-09 DIAGNOSIS — I10 HYPERTENSION, UNSPECIFIED TYPE: ICD-10-CM

## 2023-08-09 DIAGNOSIS — L30.4 INTERTRIGO: ICD-10-CM

## 2023-08-09 DIAGNOSIS — M79.7 FIBROMYALGIA: ICD-10-CM

## 2023-08-09 DIAGNOSIS — L72.0 EPIDERMOID CYST: ICD-10-CM

## 2023-08-09 DIAGNOSIS — E03.8 SUBCLINICAL HYPOTHYROIDISM: ICD-10-CM

## 2023-08-09 DIAGNOSIS — E55.9 VITAMIN D DEFICIENCY: ICD-10-CM

## 2023-08-09 DIAGNOSIS — Z79.899 CONTROLLED SUBSTANCE AGREEMENT SIGNED: ICD-10-CM

## 2023-08-09 DIAGNOSIS — E88.810 METABOLIC SYNDROME: ICD-10-CM

## 2023-08-09 DIAGNOSIS — R51.9 NONINTRACTABLE EPISODIC HEADACHE, UNSPECIFIED HEADACHE TYPE: ICD-10-CM

## 2023-08-09 DIAGNOSIS — E78.2 MIXED HYPERLIPIDEMIA: ICD-10-CM

## 2023-08-09 DIAGNOSIS — Z00.00 ANNUAL PHYSICAL EXAM: Primary | ICD-10-CM

## 2023-08-09 LAB
ALBUMIN SERPL BCG-MCNC: 4.3 G/DL (ref 3.5–5.2)
ALP SERPL-CCNC: 59 U/L (ref 35–104)
ALT SERPL W P-5'-P-CCNC: 33 U/L (ref 0–50)
ANION GAP SERPL CALCULATED.3IONS-SCNC: 14 MMOL/L (ref 7–15)
APTT PPP: 23 SECONDS (ref 22–38)
AST SERPL W P-5'-P-CCNC: 25 U/L (ref 0–45)
BASOPHILS # BLD AUTO: 0.1 10E3/UL (ref 0–0.2)
BASOPHILS NFR BLD AUTO: 1 %
BILIRUB SERPL-MCNC: 0.5 MG/DL
BUN SERPL-MCNC: 15.9 MG/DL (ref 6–20)
CALCIUM SERPL-MCNC: 10 MG/DL (ref 8.6–10)
CHLORIDE SERPL-SCNC: 101 MMOL/L (ref 98–107)
CHOLEST SERPL-MCNC: 222 MG/DL
CREAT SERPL-MCNC: 0.99 MG/DL (ref 0.51–0.95)
DEPRECATED HCO3 PLAS-SCNC: 27 MMOL/L (ref 22–29)
EOSINOPHIL # BLD AUTO: 0.1 10E3/UL (ref 0–0.7)
EOSINOPHIL NFR BLD AUTO: 1 %
ERYTHROCYTE [DISTWIDTH] IN BLOOD BY AUTOMATED COUNT: 16.6 % (ref 10–15)
FERRITIN SERPL-MCNC: 764 NG/ML (ref 6–175)
GFR SERPL CREATININE-BSD FRML MDRD: 71 ML/MIN/1.73M2
GLUCOSE SERPL-MCNC: 96 MG/DL (ref 70–99)
HBA1C MFR BLD: 5.7 % (ref 0–5.6)
HCT VFR BLD AUTO: 41 % (ref 35–47)
HDLC SERPL-MCNC: 43 MG/DL
HGB BLD-MCNC: 13.4 G/DL (ref 11.7–15.7)
IMM GRANULOCYTES # BLD: 0 10E3/UL
IMM GRANULOCYTES NFR BLD: 0 %
INR BLD: 1 (ref 0.9–1.1)
LDLC SERPL CALC-MCNC: 147 MG/DL
LYMPHOCYTES # BLD AUTO: 2.1 10E3/UL (ref 0.8–5.3)
LYMPHOCYTES NFR BLD AUTO: 22 %
MCH RBC QN AUTO: 30.8 PG (ref 26.5–33)
MCHC RBC AUTO-ENTMCNC: 32.7 G/DL (ref 31.5–36.5)
MCV RBC AUTO: 94 FL (ref 78–100)
MONOCYTES # BLD AUTO: 0.8 10E3/UL (ref 0–1.3)
MONOCYTES NFR BLD AUTO: 8 %
NEUTROPHILS # BLD AUTO: 6.5 10E3/UL (ref 1.6–8.3)
NEUTROPHILS NFR BLD AUTO: 68 %
NONHDLC SERPL-MCNC: 179 MG/DL
PLATELET # BLD AUTO: 328 10E3/UL (ref 150–450)
POTASSIUM SERPL-SCNC: 3.9 MMOL/L (ref 3.4–5.3)
PROT SERPL-MCNC: 7.7 G/DL (ref 6.4–8.3)
RBC # BLD AUTO: 4.35 10E6/UL (ref 3.8–5.2)
SODIUM SERPL-SCNC: 142 MMOL/L (ref 136–145)
TRIGL SERPL-MCNC: 160 MG/DL
TSH SERPL DL<=0.005 MIU/L-ACNC: 3.04 UIU/ML (ref 0.3–4.2)
WBC # BLD AUTO: 9.6 10E3/UL (ref 4–11)

## 2023-08-09 PROCEDURE — 36415 COLL VENOUS BLD VENIPUNCTURE: CPT | Performed by: FAMILY MEDICINE

## 2023-08-09 PROCEDURE — 80053 COMPREHEN METABOLIC PANEL: CPT | Performed by: FAMILY MEDICINE

## 2023-08-09 PROCEDURE — 85730 THROMBOPLASTIN TIME PARTIAL: CPT | Performed by: FAMILY MEDICINE

## 2023-08-09 PROCEDURE — 84443 ASSAY THYROID STIM HORMONE: CPT | Performed by: FAMILY MEDICINE

## 2023-08-09 PROCEDURE — 82728 ASSAY OF FERRITIN: CPT | Performed by: FAMILY MEDICINE

## 2023-08-09 PROCEDURE — 82306 VITAMIN D 25 HYDROXY: CPT | Performed by: FAMILY MEDICINE

## 2023-08-09 PROCEDURE — 80061 LIPID PANEL: CPT | Performed by: FAMILY MEDICINE

## 2023-08-09 PROCEDURE — 99396 PREV VISIT EST AGE 40-64: CPT | Performed by: FAMILY MEDICINE

## 2023-08-09 PROCEDURE — 83036 HEMOGLOBIN GLYCOSYLATED A1C: CPT | Performed by: FAMILY MEDICINE

## 2023-08-09 PROCEDURE — 99214 OFFICE O/P EST MOD 30 MIN: CPT | Mod: 25 | Performed by: FAMILY MEDICINE

## 2023-08-09 PROCEDURE — 85025 COMPLETE CBC W/AUTO DIFF WBC: CPT | Performed by: FAMILY MEDICINE

## 2023-08-09 PROCEDURE — 85610 PROTHROMBIN TIME: CPT | Performed by: FAMILY MEDICINE

## 2023-08-09 RX ORDER — HYDROCHLOROTHIAZIDE 50 MG/1
50 TABLET ORAL DAILY
Qty: 90 TABLET | Refills: 3 | Status: SHIPPED | OUTPATIENT
Start: 2023-08-09 | End: 2023-08-23

## 2023-08-09 RX ORDER — METHOCARBAMOL 500 MG/1
TABLET, FILM COATED ORAL
Qty: 90 TABLET | Refills: 1 | Status: SHIPPED | OUTPATIENT
Start: 2023-08-09 | End: 2023-08-23

## 2023-08-09 RX ORDER — SPIRONOLACTONE 100 MG/1
100 TABLET, FILM COATED ORAL DAILY
Qty: 90 TABLET | Refills: 3 | Status: SHIPPED | OUTPATIENT
Start: 2023-08-09 | End: 2023-08-23

## 2023-08-09 RX ORDER — KETOCONAZOLE 20 MG/G
CREAM TOPICAL DAILY
Qty: 60 G | Refills: 1 | Status: SHIPPED | OUTPATIENT
Start: 2023-08-09

## 2023-08-09 ASSESSMENT — ANXIETY QUESTIONNAIRES
2. NOT BEING ABLE TO STOP OR CONTROL WORRYING: NOT AT ALL
IF YOU CHECKED OFF ANY PROBLEMS ON THIS QUESTIONNAIRE, HOW DIFFICULT HAVE THESE PROBLEMS MADE IT FOR YOU TO DO YOUR WORK, TAKE CARE OF THINGS AT HOME, OR GET ALONG WITH OTHER PEOPLE: SOMEWHAT DIFFICULT
7. FEELING AFRAID AS IF SOMETHING AWFUL MIGHT HAPPEN: NOT AT ALL
1. FEELING NERVOUS, ANXIOUS, OR ON EDGE: SEVERAL DAYS
5. BEING SO RESTLESS THAT IT IS HARD TO SIT STILL: SEVERAL DAYS
6. BECOMING EASILY ANNOYED OR IRRITABLE: NOT AT ALL
3. WORRYING TOO MUCH ABOUT DIFFERENT THINGS: SEVERAL DAYS
GAD7 TOTAL SCORE: 4
4. TROUBLE RELAXING: SEVERAL DAYS
GAD7 TOTAL SCORE: 4

## 2023-08-09 ASSESSMENT — PATIENT HEALTH QUESTIONNAIRE - PHQ9
SUM OF ALL RESPONSES TO PHQ QUESTIONS 1-9: 8
SUM OF ALL RESPONSES TO PHQ QUESTIONS 1-9: 8
10. IF YOU CHECKED OFF ANY PROBLEMS, HOW DIFFICULT HAVE THESE PROBLEMS MADE IT FOR YOU TO DO YOUR WORK, TAKE CARE OF THINGS AT HOME, OR GET ALONG WITH OTHER PEOPLE: SOMEWHAT DIFFICULT

## 2023-08-09 ASSESSMENT — ENCOUNTER SYMPTOMS
MYALGIAS: 0
NAUSEA: 1
FEVER: 0
DYSURIA: 0
SHORTNESS OF BREATH: 0
WEAKNESS: 0
EYE PAIN: 0
NERVOUS/ANXIOUS: 0
DIARRHEA: 0
COUGH: 0
DIZZINESS: 0
FREQUENCY: 0
PALPITATIONS: 0
HEMATOCHEZIA: 0
CHILLS: 0
PARESTHESIAS: 0
HEMATURIA: 0
HEADACHES: 1
ARTHRALGIAS: 1
BREAST MASS: 0
CONSTIPATION: 0
JOINT SWELLING: 0
SORE THROAT: 0
HEARTBURN: 1
ABDOMINAL PAIN: 0

## 2023-08-09 NOTE — PROGRESS NOTES
SUBJECTIVE:   CC: Naheed is an 47 year old who presents for preventive health visit.     Chief Complaints and History of Present Illnesses   Patient presents with    Physical     Face turns red, low hormones.    Bleeding/Bruising     Bruise easily    Recheck Medication    Cyst            8/9/2023     9:57 AM   Additional Questions   Roomed by Ivette GALLARDO CMA   Accompanied by        Healthy Habits:     Getting at least 3 servings of Calcium per day:  NO    Bi-annual eye exam:  Yes    Dental care twice a year:  NO    Sleep apnea or symptoms of sleep apnea:  Sleep apnea    Diet:  Regular (no restrictions)    Frequency of exercise:  2-3 days/week    Duration of exercise:  15-30 minutes    Taking medications regularly:  Yes    Additional concerns today:  Yes    RED FACE: Symptoms started last year, when exercising or expending energy, face becomes red and no longer sweats.      LOW HORMONES: wondering as premenopausal where she is at, mirena in place.      EASY BRUISING/BLEEDING: noticing increased bruising     CYSTS: Having spots all over torso, wondering if related to the spironolactone.   Think may be related to keratin.      MOOD:   Currently on wellbutrin 150mg daily, nortriptyline 20mg at bedtime, venlafaxine 25mg daily  Previously on Cymbalta - felt like a zombie but helped with fibro symptoms. Previously did with Lyrica.     Today's PHQ-9 Score:       8/9/2023     9:47 AM   PHQ-9 SCORE   PHQ-9 Total Score MyChart 8 (Mild depression)   PHQ-9 Total Score 8         7/29/2022     5:03 PM 4/11/2023    12:29 PM 8/9/2023     9:47 AM   PHQ   PHQ-9 Total Score 6 5 8   Q9: Thoughts of better off dead/self-harm past 2 weeks Not at all Not at all Not at all         7/29/2022     5:03 PM 4/11/2023    12:30 PM 8/9/2023     9:47 AM   CHANCE-7 SCORE   Total Score  3 (minimal anxiety) 4 (minimal anxiety)   Total Score 3 3 4     CHRONIC PAIN:   Taking celebrex 100mg BID, medical cannabis, methocarbamol, tizanidine, Lyrica  200mg QHS   [facility] pain clinic managing Lyrica. Low dose venlafaxine also on board for fibro symptoms.  Wondering about retrying as needed with fibro spikes.     SLEEP: Using Tylenol PM 2 tabs at bedtime and clonazepam 1mg at bedtime, ambien 5mg at bedtime     [facility] managing RAYRAY, RLS, migraines    BLOOD PRESSURE: Currently on hydrochlorothiazide 50mg, spironolactone 50mg daily currently (didn't increase dose). Is taking potassium supplement most of the time.      BP Readings from Last 6 Encounters:   23 134/84   23 120/78   10/07/22 116/59   22 128/84   22 139/85   16 110/88     IUD: Mirena in placed, inserted 2019    GERD: Currently on omeprazole 20mg     MIGRAINES: On propranolol, nortriptyline, maxalt PRN.   Following up with Jostin, managing.    HAIR LOSS: Taking Rogaine, on spironolactone.     RLS: On requip 0.25mg BID    WEIGHT:    Body mass index is 57.99 kg/m .  Currently doing Naltrexone with wellbutrin for weight management. Feels like it is helping. Cuts pill in half.     HEALTH MAINTENANCE:   - Hep B: will check records and read handou   - Colon: will do cologuard   - mammo: will order     Social History     Tobacco Use    Smoking status: Never    Smokeless tobacco: Never   Substance Use Topics    Alcohol use: Never             2023     4:32 PM   Alcohol Use   Prescreen: >3 drinks/day or >7 drinks/week? No     Reviewed orders with patient.  Reviewed health maintenance and updated orders accordingly - Yes      Breast Cancer Screenin/28/2022    11:40 AM 2022     5:07 PM   Breast CA Risk Assessment (FHS-7)   Do you have a family history of breast, colon, or ovarian cancer? No / Unknown No / Unknown       Mammogram Screening: Recommended annual mammography  Pertinent mammograms are reviewed under the imaging tab.    History of abnormal Pap smear: NO - age 30-65 PAP every 5 years with negative HPV co-testing recommended     Reviewed and updated as  "needed this visit by clinical staff   Tobacco  Allergies  Meds              Reviewed and updated as needed this visit by Provider   Tobacco  Allergies  Meds  Problems  Med Hx  Surg Hx  Fam Hx             Review of Systems   Constitutional:  Negative for chills and fever.   HENT:  Negative for congestion, ear pain, hearing loss and sore throat.    Eyes:  Negative for pain and visual disturbance.   Respiratory:  Negative for cough and shortness of breath.    Cardiovascular:  Negative for chest pain, palpitations and peripheral edema.   Gastrointestinal:  Positive for heartburn and nausea. Negative for abdominal pain, constipation, diarrhea and hematochezia.   Breasts:  Negative for tenderness, breast mass and discharge.   Genitourinary:  Negative for dysuria, frequency, genital sores, hematuria, pelvic pain, urgency, vaginal bleeding and vaginal discharge.   Musculoskeletal:  Positive for arthralgias. Negative for joint swelling and myalgias.   Skin:  Negative for rash.   Neurological:  Positive for headaches. Negative for dizziness, weakness and paresthesias.   Psychiatric/Behavioral:  Negative for mood changes. The patient is not nervous/anxious.      See HPI above.      OBJECTIVE:   /84 (BP Location: Left arm, Patient Position: Sitting, Cuff Size: Adult Regular)   Pulse 76   Temp 98.4  F (36.9  C) (Oral)   Resp 16   Ht 1.683 m (5' 6.25\")   Wt (!) 164.2 kg (362 lb)   LMP  (LMP Unknown)   SpO2 97%   BMI 57.99 kg/m    Physical Exam  GENERAL: healthy, obese, alert and no distress  EYES: Eyes grossly normal to inspection, PERRL and conjunctivae and sclerae normal  HENT: ear canals and TM's normal, nose and mouth without ulcers or lesions  NECK: no adenopathy, no asymmetry, masses, or scars and thyroid normal to palpation  RESP: lungs clear to auscultation - no rales, rhonchi or wheezes  CV: regular rate and rhythm, normal S1 S2, no S3 or S4, no murmur, click or rub, no peripheral edema and " peripheral pulses strong  ABDOMEN: soft, nontender, no hepatosplenomegaly, no masses and bowel sounds normal  MS: no gross musculoskeletal defects noted, no edema  SKIN: no suspicious lesions or rashes  NEURO: Normal strength and tone, mentation intact and speech normal  PSYCH: mentation appears normal, affect normal/bright        ASSESSMENT/PLAN:     1. Annual physical exam  - REVIEW OF HEALTH MAINTENANCE PROTOCOL ORDERS    Reviewed health history and health maintenance recommendations.    2. Metabolic syndrome  3. Morbid obesity (H)  - TSH with free T4 reflex; Future  - Comprehensive metabolic panel (BMP + Alb, Alk Phos, ALT, AST, Total. Bili, TP)  - Lipid panel reflex to direct LDL Non-fasting  - Hemoglobin A1c    Currently working with the weight management team for medication manage weight loss, she is on a combination of Wellbutrin and naltrexone.  I can take over the prescription for naltrexone when she completes her program.  Continue healthy lifestyle modifications.  Monitoring labs to evaluate for metabolic syndrome today.    4. Controlled substance agreement signed - 8/9/23    Due for renewal of controlled substance agreement due to prescriptions for clonazepam and Ambien that she is using for sleep.  She is not due for refill today.    5. Fibromyalgia  6. Other chronic pain  7. Migraine without status migrainosus, not intractable, unspecified migraine type  8. Nonintractable episodic headache, unspecified headache type  - Pain Management  Referral; Future  - methocarbamol (ROBAXIN) 500 MG tablet; TAKE ONE TABLET BY MOUTH ONCE DAILY AS NEEDED FOR MUSCLE SPASMS  Dispense: 90 tablet; Refill: 1  - Pain Management  Referral; Future  - Adult Neurology  Referral; Future    Patient looking for second opinion today for both pain clinic and neurology.  Orders placed as requested.  Symptoms remain difficult to control and she is looking for second opinion.  She remains on Tylenol,  Celebrex, medical marijuana, methocarbamol and tizanidine (not taking together), nortriptyline, Lyrica, and venlafaxine.    9. Female pattern hair loss  - spironolactone (ALDACTONE) 100 MG tablet; Take 1 tablet (100 mg) by mouth daily  Dispense: 90 tablet; Refill: 3  - TSH with free T4 reflex  - Ferritin    Due for refill of spironolactone.  Continue spironolactone and combination with Rogaine topical solution.  Rule out thyroid disease.  Recheck iron level.  We are increasing her spironolactone from 50 mg daily to 100 mg.  She does think she is experiencing improvement in symptoms.    10. Hypertension, unspecified type  - hydrochlorothiazide (HYDRODIURIL) 50 MG tablet; Take 1 tablet (50 mg) by mouth daily  Dispense: 90 tablet; Refill: 3  - Comprehensive metabolic panel (BMP + Alb, Alk Phos, ALT, AST, Total. Bili, TP)    Continue hydrochlorothiazide and spironolactone.  Taking potassium supplementation occasionally.  Recheck potassium and kidney function level today.  Blood pressure is at goal.    11. Mixed hyperlipidemia  - Lipid panel reflex to direct LDL Non-fasting    ASCVD risk is low, continue healthy lifestyle modifications, recheck levels today and recalculate ASCVD.    12. Subclinical hypothyroidism  - TSH with free T4 reflex; Future    Mild subclinical hypothyroidism on last check, recheck labs today.    13. Vitamin D deficiency  - Vitamin D Deficiency    Recheck labs today.  Continues vitamin D supplementation.    14. Grief  - Adult Mental Health  Referral; Future    Trying to process grief from her father passing away.  On a combination of mood medications prescribed by various providers.  Recommend working with a therapist to help process her grief.    15. Epidermoid cyst  No active examples, but she describes lesions that sound like potential epidermoid cysts.  Provided education today.  If she has an active spot in the future, recommend reevaluation, we could possibly excise or refer to  "dermatology.    16. Easy bruising  - INR point of care  - Partial thromboplastin time  - CBC with platelets and differential    Naheed is concerned about easy bruising.  Will proceed with workup per above.  No red flag symptoms present, I suspect is more related to focal trauma related to everyday activities.    17. Intertrigo  - ketoconazole (NIZORAL) 2 % external cream; Apply topically daily Apply to skin folds as needed  Dispense: 60 g; Refill: 1    Reports red, irritated skin in skin folds, particularly under breasts.  Try to help area dry out.  Will treat with topical antifungal cream.  If symptoms persisting we could trial oral Diflucan weekly for few weeks.    18. Visit for screening mammogram  - MA SCREENING DIGITAL BILAT - Future  (s+30); Future    19. Screen for colon cancer  - COLOGUARD([employer] SCI[employer]ES); Future     Patient has been advised of split billing requirements and indicates understanding: Yes      COUNSELING:  Reviewed preventive health counseling, as reflected in patient instructions      BMI:   Estimated body mass index is 57.99 kg/m  as calculated from the following:    Height as of this encounter: 1.683 m (5' 6.25\").    Weight as of this encounter: 164.2 kg (362 lb).   Weight management plan: On Wellbutrin and naltrexone.  Continue lifestyle modifications.      She reports that she has never smoked. She has never used smokeless tobacco.          Damaris Son, [facility]  "

## 2023-08-09 NOTE — PATIENT INSTRUCTIONS
Mental Health referral recommendations:    [facility]:   [facility] [phone]  [address], [place] [address]     [place]:  [place] [facility]: [phone]  [address], [facility] [address]    [facility]: [phone]  [address], [place] [address]    [facility]:  [phone]   [address] [place] [address]    [facility]:  [facility] [phone]   [address]    [place]:  [facility]: [phone]  [address]     [place]:  [facility]  [phone]  [address], [place] [address]    [place]:  [place] [facility] [phone]    [address], [facility]  [id number]    [facility] /[phone]   [address], [address]    [facility] [phone] [address], [address]    [facility] [phone]   [facility]    Below are resources in case you experience an increase in symptoms or feel you are in crisis:     Acute Emergency / Crisis  If you are having an acute psychiatric emergency, please call 911 or have someone drive you directly to a hospital for further evaluation.    Mental Health Crisis Lines    [facility]:     Adult Crisis Line ([phone])    Children's Crisis Line (038-195-1469)  [facility]:  Crisis Connection  ([phone])      Urgent Care   [address] [place] [address]   ([phone])   Provides mental health crisis assessments  Walk-in appointments are available     Additional resources  -Local 24 hour Crisis Line: 1-581.347.9262   -National Suicide Prevention Life Line 2-800-395-TALK (6168), www.suicidepreventionlifeline.org  - National [place] of Mental Illness (www.[place].gunner.org) 879.580.4980 or 1-544.157.7346  - Suicide Awareness Voices of Education (SAVE) (www.save.org) 8-413-369-SAVE (1661)   - Substance Abuse and Mental  Health Services Administration ([facility]) www.[facility].gov 24 hour helpline: 3-670-841 HELP (1991)  - Narcotics Anonymous (www.[place]im[place].org) 24 hours [facility] Helpline 1-962.215.3126  - [facility] Alcoholic Anonymous Intergroup 24 hour phone line 059-619-0023  - Alcoholics Anonymous (www.alcoholics-anonymous.org)  - [facility] Connection ([facility]). [facility] connects people seeking recovery to resources that help foster and sustain long-term recovery. Whether you are seeking resources for treatment, transpiortation, housing, job training, education, health or other pathways to recovery, [facility] is a great place to start. [phone] www.[place] [employer].org    Health Tips:  - Create a daily schedule  - Eat Healthy  - Do at least one enjoyable activity each day  - Take medications as prescribed  - Get regular sleep at least 8 hours per night  - Practice relaxation  - Spend time with supportive people        Preventive Health Recommendations  Female Ages 40 to 49    Yearly exam:   See your health care provider every year in order to  Review health changes.   Discuss preventive care.    Review your medicines if your doctor prescribed any.    Get a Pap test every three years (unless you have an abnormal result and your provider advises testing more often).    If you get Pap tests with HPV test, you only need to test every 5 years, unless you have an abnormal result. You do not need a Pap test if your uterus was removed (hysterectomy) and you have not had cancer.    You should be tested each year for STDs (sexually transmitted diseases), if you're at risk.   Ask your doctor if you should have a mammogram.    Have a colonoscopy (test for colon cancer) if someone in your family has had colon cancer or polyps before age 50.     Have a cholesterol test every 5 years.     Have a diabetes test (fasting glucose) after age 45. If you are at risk for diabetes, you should have this test every 3 years.    Shots:  Get a flu shot each year. Get a tetanus shot every 10 years.     Nutrition:   Eat at least 5 servings of fruits and vegetables each day.  Eat whole-grain bread, whole-wheat pasta and brown rice instead of white grains and rice.  Get adequate Calcium and Vitamin D.      Lifestyle  Exercise at least 150 minutes a week (an average of 30 minutes a day, 5 days a week). This will help you control your weight and prevent disease.  Limit alcohol to one drink per day.  No smoking.   Wear sunscreen to prevent skin cancer.  See your dentist every six months for an exam and cleaning.

## 2023-08-09 NOTE — LETTER
[facility]  08/09/23  Patient: Estephania Wong  YOB: 1975  Medical Record Number: [id number]                                                                                  Non-Opioid Controlled Substance Agreement    This is an agreement between you and your provider regarding safe and appropriate use of controlled substances prescribed by your care team. Controlled substances are?medicines that can cause physical and mental dependence (abuse).     There are strict laws about having and using these medicines. We here at [facility] are  committed to working with you in your efforts to get better. To support you in this work, we'll help you schedule regular office appointments for medicine refills. If we must cancel or change your appointment for any reason, we'll make sure you have enough medicine to last until your next appointment.     As a Provider, I will:   Listen carefully to your concerns while treating you with respect.   Recommend a treatment plan that I believe is in your best interest and may involve therapies other than medicine.    Talk with you often about the possible benefits and the risk of harm of any medicine that we prescribe for you.  Assess the safety of this medicine and check how well it works.    Provide a plan on how to taper (discontinue or go off) using this medicine if the decision is made to stop its use.      ::  As a Patient, I understand controlled substances:     Are prescribed by my care provider to help me function or work and manage my condition(s).?  Are strong medicines and can cause serious side effects.     Need to be taken exactly as prescribed.?Combining controlled substances with certain medicines or chemicals (such as illegal drugs, alcohol, sedatives, sleeping pills, and benzodiazepines) can be dangerous or even fatal.? If I stop taking my medicines suddenly, I may have severe withdrawal symptoms.     The risks,  benefits, and side effects of these medicine(s) were explained to me. I agree that:    I will take part in other treatments as advised by my care team. This may be psychiatry or counseling, physical therapy, behavioral therapy, group treatment or a referral to specialist.    I will keep all my appointments and understand this is part of the monitoring of controlled substances.?My care team may require an office visit for EVERY controlled substance refill. If I miss appointments or don t follow instructions, my care team may stop my medicine    I will take my medicines as prescribed. I will not change the dose or schedule unless my care team tells me to. There will be no refills if I run out early.      I may be asked to come to the clinic and complete a urine drug test or complete a pill count. If I don t give a urine sample or participate in a pill count, the care team may stop my medicine.    I will only receive controlled substance prescriptions from this clinic. If I am treated by another provider, I will tell them that I am taking controlled substances and that I have a treatment agreement with this provider. I will inform my [facility] care team within one business day if I am given a prescription for any controlled substance by another healthcare provider. My [facility] care team can contact other providers and pharmacists about my use of any medicines.    It is up to me to make sure that I don't run out of my medicines on weekends or holidays.?If my care team is willing to refill my prescription without a visit, I must request refills only during office hours. Refills may take up to 3 business days to process. I will use one pharmacy to fill all my controlled substance prescriptions. I will notify the clinic about any changes to my insurance or medicine availability.    I am responsible for my prescriptions. If the medicine/prescription is lost, stolen or destroyed, it will not be replaced.?I  also agree not to share controlled substance medicines with anyone.     I am aware I should not use any illegal or recreational drugs. I agree not to drink alcohol unless my care team says I can.     If I enroll in the [place] Medical Cannabis program, I will tell my care team before my next refill.    I will tell my care team right away if I become pregnant, have a new medical problem treated outside of my regular clinic, or have a change in my medicines.     I understand that this medicine can affect my thinking, judgment and reaction time.? Alcohol and drugs affect the brain and body, which can affect the safety of my driving. Being under the influence of alcohol or drugs can affect my decision-making, behaviors, personal safety and the safety of others. Driving while impaired (DWI) can occur if a person is driving, operating or in physical control of a car, motorcycle, boat, snowmobile, ATV, motorbike, off-road vehicle or any other motor vehicle ([place] Statute 169A.20). I understand the risk if I choose to drive or operate any vehicle or machinery.    I understand that if I do not follow any of the conditions above, my prescriptions or treatment may be stopped or changed.   I agree that my provider, clinic care team and pharmacy may work with any city, state or federal law enforcement agency that investigates the misuse, sale or other diversion of my controlled medicine. I will allow my provider to discuss my care with, or share a copy of, this agreement with any other treating provider, pharmacy or emergency room where I receive care.     I have read this agreement and have asked questions about anything I did not understand.    ________________________________________________________  Patient Signature - Estephania Wong     ___________________                   Date     ________________________________________________________  Provider Signature - Damaris Son, DO       ___________________                    Date     ________________________________________________________  Witness Signature (required if provider not present while patient signing)          ___________________                   Date

## 2023-08-10 LAB — DEPRECATED CALCIDIOL+CALCIFEROL SERPL-MC: 70 UG/L (ref 20–75)

## 2023-08-11 NOTE — RESULT ENCOUNTER NOTE
Patient updated by [facility] message with lab results.   The 10-year ASCVD risk score (Eleonora DK, et al., 2019) is: 2.2%      Dawit Farfan,  Thank you for coming into the clinic. It was great to see you again. I am reaching out with your lab results.   1. Cholesterol numbers are up a touch from last check. Your estimated risk of cardiovascular disease remains well below the treatment threshold. Continue working on a heart healthy/nutrient dense diet, regular physical activity, and weight management.   2. Your A1c has dropped from 6.2 to 5.7. This is a great improvement and reflective of your weight management efforts. We should continue to check this annually.  3. Remaining labs are all normal and/or stable from previous checks.  Continue with treatment plan as discussed in clinic. Reach out with any follow up questions or concerns.  Damaris Son, DO

## 2023-08-17 ENCOUNTER — TRANSFERRED RECORDS (OUTPATIENT)
Dept: HEALTH INFORMATION MANAGEMENT | Facility: CLINIC | Age: 48
End: 2023-08-17
Payer: COMMERCIAL

## 2023-08-23 DIAGNOSIS — G89.29 OTHER CHRONIC PAIN: ICD-10-CM

## 2023-08-23 DIAGNOSIS — M25.569 KNEE PAIN, UNSPECIFIED CHRONICITY, UNSPECIFIED LATERALITY: ICD-10-CM

## 2023-08-23 DIAGNOSIS — E66.01 MORBID OBESITY (H): ICD-10-CM

## 2023-08-23 DIAGNOSIS — R51.9 NONINTRACTABLE EPISODIC HEADACHE, UNSPECIFIED HEADACHE TYPE: ICD-10-CM

## 2023-08-23 DIAGNOSIS — I10 HYPERTENSION, UNSPECIFIED TYPE: ICD-10-CM

## 2023-08-23 DIAGNOSIS — G25.81 RESTLESS LEGS SYNDROME (RLS): ICD-10-CM

## 2023-08-23 DIAGNOSIS — M54.50 LOW BACK PAIN, UNSPECIFIED BACK PAIN LATERALITY, UNSPECIFIED CHRONICITY, UNSPECIFIED WHETHER SCIATICA PRESENT: ICD-10-CM

## 2023-08-23 DIAGNOSIS — L65.8 FEMALE PATTERN HAIR LOSS: ICD-10-CM

## 2023-08-23 RX ORDER — BUPROPION HYDROCHLORIDE 150 MG/1
150 TABLET ORAL EVERY MORNING
Qty: 90 TABLET | Refills: 3 | Status: SHIPPED | OUTPATIENT
Start: 2023-08-23 | End: 2024-08-15

## 2023-08-23 RX ORDER — ROPINIROLE 0.25 MG/1
0.25 TABLET, FILM COATED ORAL 2 TIMES DAILY
Qty: 180 TABLET | Refills: 3 | Status: SHIPPED | OUTPATIENT
Start: 2023-08-23 | End: 2023-12-06

## 2023-08-23 NOTE — TELEPHONE ENCOUNTER
Faxed prescription request from [facility].  Called and confirmed with pt that she does actually want her prescriptions to go to [facility].  Scripts were previously sent to [facility] but they are not open late or on weekends.

## 2023-08-23 NOTE — TELEPHONE ENCOUNTER
"Routing refill request to provider for review/approval because:  Drug not on the [facility] refill protocol   Labs not current:  Cr    Last Written Prescription Date:  12/26/22  Last Fill Quantity: 180,  # refills: 2   Last office visit provider:  8/9/23     Requested Prescriptions   Pending Prescriptions Disp Refills    celecoxib (CELEBREX) 100 MG capsule 180 capsule 2     Sig: Take 1 capsule (100 mg) by mouth 2 times daily       NSAID Medications Failed - 8/23/2023  8:20 AM        Failed - Normal serum creatinine on file in past 12 months     Recent Labs   Lab Test 08/09/23  1222   CR 0.99*       Ok to refill medication if creatinine is low          Passed - Blood pressure under 140/90 in past 12 months     BP Readings from Last 3 Encounters:   08/09/23 134/84   04/11/23 120/78   10/07/22 116/59                 Passed - Normal ALT on file in past 12 months     Recent Labs   Lab Test 08/09/23  1222   ALT 33             Passed - Normal AST on file in past 12 months     Recent Labs   Lab Test 08/09/23  1222   AST 25             Passed - Recent (12 mo) or future (30 days) visit within the authorizing provider's specialty     Patient has had an office visit with the authorizing provider or a provider within the authorizing providers department within the previous 12 mos or has a future within next 30 days. See \"Patient Info\" tab in inbasket, or \"Choose Columns\" in Meds & Orders section of the refill encounter.              Passed - Patient is age 6-64 years        Passed - Normal CBC on file in past 12 months     Recent Labs   Lab Test 08/09/23  1222   WBC 9.6   RBC 4.35   HGB 13.4   HCT 41.0                    Passed - Medication is active on med list        Passed - No active pregnancy on record        Passed - No positive pregnancy test in past 12 months          methocarbamol (ROBAXIN) 500 MG tablet 90 tablet 1     Sig: TAKE ONE TABLET BY MOUTH ONCE DAILY AS NEEDED FOR MUSCLE SPASMS       There is no refill " "protocol information for this order       hydrochlorothiazide (HYDRODIURIL) 50 MG tablet 90 tablet 3     Sig: Take 1 tablet (50 mg) by mouth daily       Diuretics (Including Combos) Protocol Failed - 8/23/2023  8:20 AM        Failed - Normal serum creatinine on file in past 12 months     Recent Labs   Lab Test 08/09/23  1222   CR 0.99*              Passed - Blood pressure under 140/90 in past 12 months     BP Readings from Last 3 Encounters:   08/09/23 134/84   04/11/23 120/78   10/07/22 116/59                 Passed - Recent (12 mo) or future (30 days) visit within the authorizing provider's specialty     Patient has had an office visit with the authorizing provider or a provider within the authorizing providers department within the previous 12 mos or has a future within next 30 days. See \"Patient Info\" tab in inbasket, or \"Choose Columns\" in Meds & Orders section of the refill encounter.              Passed - Medication is active on med list        Passed - Patient is age 18 or older        Passed - No active pregancy on record        Passed - Normal serum potassium on file in past 12 months     Recent Labs   Lab Test 08/09/23  1222   POTASSIUM 3.9                    Passed - Normal serum sodium on file in past 12 months     Recent Labs   Lab Test 08/09/23  1222                 Passed - No positive pregnancy test in past 12 months          fluticasone (FLONASE) 50 MCG/ACT nasal spray       Sig: Spray 2 sprays in nostril daily as needed       Nasal Allergy Protocol Passed - 8/23/2023  8:20 AM        Passed - Patient is age 12 or older        Passed - Recent (12 mo) or future (30 days) visit within the authorizing provider's specialty     Patient has had an office visit with the authorizing provider or a provider within the authorizing providers department within the previous 12 mos or has a future within next 30 days. See \"Patient Info\" tab in inbasket, or \"Choose Columns\" in Meds & Orders section of the " "refill encounter.              Passed - Medication is active on med list          minoxidil (ROGAINE WOMENS) 2 % external solution 60 mL 2     Sig: Apply topically 2 times daily       There is no refill protocol information for this order       spironolactone (ALDACTONE) 100 MG tablet 90 tablet 3     Sig: Take 1 tablet (100 mg) by mouth daily       Diuretics (Including Combos) Protocol Failed - 8/23/2023  8:20 AM        Failed - Normal serum creatinine on file in past 12 months     Recent Labs   Lab Test 08/09/23  1222   CR 0.99*              Passed - Blood pressure under 140/90 in past 12 months     BP Readings from Last 3 Encounters:   08/09/23 134/84   04/11/23 120/78   10/07/22 116/59                 Passed - Recent (12 mo) or future (30 days) visit within the authorizing provider's specialty     Patient has had an office visit with the authorizing provider or a provider within the authorizing providers department within the previous 12 mos or has a future within next 30 days. See \"Patient Info\" tab in inbasket, or \"Choose Columns\" in Meds & Orders section of the refill encounter.              Passed - Medication is active on med list        Passed - Patient is age 18 or older        Passed - No active pregancy on record        Passed - Normal serum potassium on file in past 12 months     Recent Labs   Lab Test 08/09/23  1222   POTASSIUM 3.9                    Passed - Normal serum sodium on file in past 12 months     Recent Labs   Lab Test 08/09/23  1222                 Passed - No positive pregnancy test in past 12 months         Signed Prescriptions Disp Refills    buPROPion (WELLBUTRIN XL) 150 MG 24 hr tablet 90 tablet 3     Sig: Take 1 tablet (150 mg) by mouth every morning       SSRIs Protocol Passed - 8/23/2023  8:20 AM        Passed - Recent (12 mo) or future (30 days) visit within the authorizing provider's specialty     Patient has had an office visit with the authorizing provider or a provider " "within the authorizing providers department within the previous 12 mos or has a future within next 30 days. See \"Patient Info\" tab in inbasket, or \"Choose Columns\" in Meds & Orders section of the refill encounter.              Passed - Medication is Bupropion     If the medication is Bupropion (Wellbutrin), and the patient is taking for smoking cessation; OK to refill.          Passed - Medication is active on med list        Passed - Patient is age 18 or older        Passed - No active pregnancy on record        Passed - No positive pregnancy test in last 12 months          rOPINIRole (REQUIP) 0.25 MG tablet 180 tablet 3     Sig: Take 1 tablet (0.25 mg) by mouth 2 times daily 6PM, 11PM       Antiparkinson's Agents Protocol Passed - 8/23/2023  8:20 AM        Passed - Blood pressure under 140/90 in past 12 months     BP Readings from Last 3 Encounters:   08/09/23 134/84   04/11/23 120/78   10/07/22 116/59                 Passed - CBC on record in past 12 months     Recent Labs   Lab Test 08/09/23  1222   WBC 9.6   RBC 4.35   HGB 13.4   HCT 41.0                    Passed - ALT on record in past 12 months         Recent Labs   Lab Test 08/09/23  1222   ALT 33             Passed - Serum Creatinine on file in past 12 months     Recent Labs   Lab Test 08/09/23  1222   CR 0.99*       Ok to refill medication if creatinine is low          Passed - Medication is active on med list        Passed - Patient is age 18 or older        Passed - No active pregnancy on record        Passed - No positive pregnancy test in the past 12 months        Passed - Recent (6 mo) or future (30 days) visit within the authorizing provider's specialty     Patient had office visit in the last 6 months or has a visit in the next 30 days with authorizing provider or within the authorizing provider's specialty.  See \"Patient Info\" tab in inbasket, or \"Choose Columns\" in Meds & Orders section of the refill encounter.                 Shani" "Sameer 08/23/23 12:53 PM    Requested Prescriptions   Pending Prescriptions Disp Refills    buPROPion (WELLBUTRIN XL) 150 MG 24 hr tablet 90 tablet 2     Sig: Take 1 tablet (150 mg) by mouth every morning       SSRIs Protocol Passed - 8/23/2023  8:20 AM        Passed - Recent (12 mo) or future (30 days) visit within the authorizing provider's specialty     Patient has had an office visit with the authorizing provider or a provider within the authorizing providers department within the previous 12 mos or has a future within next 30 days. See \"Patient Info\" tab in inbasket, or \"Choose Columns\" in Meds & Orders section of the refill encounter.              Passed - Medication is Bupropion     If the medication is Bupropion (Wellbutrin), and the patient is taking for smoking cessation; OK to refill.          Passed - Medication is active on med list        Passed - Patient is age 18 or older        Passed - No active pregnancy on record        Passed - No positive pregnancy test in last 12 months          celecoxib (CELEBREX) 100 MG capsule 180 capsule 2     Sig: Take 1 capsule (100 mg) by mouth 2 times daily       NSAID Medications Failed - 8/23/2023  8:20 AM        Failed - Normal serum creatinine on file in past 12 months     Recent Labs   Lab Test 08/09/23  1222   CR 0.99*       Ok to refill medication if creatinine is low          Passed - Blood pressure under 140/90 in past 12 months     BP Readings from Last 3 Encounters:   08/09/23 134/84   04/11/23 120/78   10/07/22 116/59                 Passed - Normal ALT on file in past 12 months     Recent Labs   Lab Test 08/09/23  1222   ALT 33             Passed - Normal AST on file in past 12 months     Recent Labs   Lab Test 08/09/23  1222   AST 25             Passed - Recent (12 mo) or future (30 days) visit within the authorizing provider's specialty     Patient has had an office visit with the authorizing provider or a provider within the authorizing providers " "department within the previous 12 mos or has a future within next 30 days. See \"Patient Info\" tab in inbasket, or \"Choose Columns\" in Meds & Orders section of the refill encounter.              Passed - Patient is age 6-64 years        Passed - Normal CBC on file in past 12 months     Recent Labs   Lab Test 08/09/23  1222   WBC 9.6   RBC 4.35   HGB 13.4   HCT 41.0                    Passed - Medication is active on med list        Passed - No active pregnancy on record        Passed - No positive pregnancy test in past 12 months          methocarbamol (ROBAXIN) 500 MG tablet 90 tablet 1     Sig: TAKE ONE TABLET BY MOUTH ONCE DAILY AS NEEDED FOR MUSCLE SPASMS       There is no refill protocol information for this order       hydrochlorothiazide (HYDRODIURIL) 50 MG tablet 90 tablet 3     Sig: Take 1 tablet (50 mg) by mouth daily       Diuretics (Including Combos) Protocol Failed - 8/23/2023  8:20 AM        Failed - Normal serum creatinine on file in past 12 months     Recent Labs   Lab Test 08/09/23  1222   CR 0.99*              Passed - Blood pressure under 140/90 in past 12 months     BP Readings from Last 3 Encounters:   08/09/23 134/84   04/11/23 120/78   10/07/22 116/59                 Passed - Recent (12 mo) or future (30 days) visit within the authorizing provider's specialty     Patient has had an office visit with the authorizing provider or a provider within the authorizing providers department within the previous 12 mos or has a future within next 30 days. See \"Patient Info\" tab in inbasket, or \"Choose Columns\" in Meds & Orders section of the refill encounter.              Passed - Medication is active on med list        Passed - Patient is age 18 or older        Passed - No active pregancy on record        Passed - Normal serum potassium on file in past 12 months     Recent Labs   Lab Test 08/09/23  1222   POTASSIUM 3.9                    Passed - Normal serum sodium on file in past 12 months     " "Recent Labs   Lab Test 08/09/23  1222                 Passed - No positive pregnancy test in past 12 months          fluticasone (FLONASE) 50 MCG/ACT nasal spray       Sig: Spray 2 sprays in nostril daily as needed       Nasal Allergy Protocol Passed - 8/23/2023  8:20 AM        Passed - Patient is age 12 or older        Passed - Recent (12 mo) or future (30 days) visit within the authorizing provider's specialty     Patient has had an office visit with the authorizing provider or a provider within the authorizing providers department within the previous 12 mos or has a future within next 30 days. See \"Patient Info\" tab in inbasket, or \"Choose Columns\" in Meds & Orders section of the refill encounter.              Passed - Medication is active on med list          minoxidil (ROGAINE WOMENS) 2 % external solution 60 mL 2     Sig: Apply topically 2 times daily       There is no refill protocol information for this order       rOPINIRole (REQUIP) 0.25 MG tablet 180 tablet 3     Sig: Take 1 tablet (0.25 mg) by mouth 2 times daily 6PM, 11PM       Antiparkinson's Agents Protocol Passed - 8/23/2023  8:20 AM        Passed - Blood pressure under 140/90 in past 12 months     BP Readings from Last 3 Encounters:   08/09/23 134/84   04/11/23 120/78   10/07/22 116/59                 Passed - CBC on record in past 12 months     Recent Labs   Lab Test 08/09/23  1222   WBC 9.6   RBC 4.35   HGB 13.4   HCT 41.0                    Passed - ALT on record in past 12 months         Recent Labs   Lab Test 08/09/23  1222   ALT 33             Passed - Serum Creatinine on file in past 12 months     Recent Labs   Lab Test 08/09/23  1222   CR 0.99*       Ok to refill medication if creatinine is low          Passed - Medication is active on med list        Passed - Patient is age 18 or older        Passed - No active pregnancy on record        Passed - No positive pregnancy test in the past 12 months        Passed - Recent (6 mo) or " "future (30 days) visit within the authorizing provider's specialty     Patient had office visit in the last 6 months or has a visit in the next 30 days with authorizing provider or within the authorizing provider's specialty.  See \"Patient Info\" tab in inbasket, or \"Choose Columns\" in Meds & Orders section of the refill encounter.              spironolactone (ALDACTONE) 100 MG tablet 90 tablet 3     Sig: Take 1 tablet (100 mg) by mouth daily       Diuretics (Including Combos) Protocol Failed - 8/23/2023  8:20 AM        Failed - Normal serum creatinine on file in past 12 months     Recent Labs   Lab Test 08/09/23  1222   CR 0.99*              Passed - Blood pressure under 140/90 in past 12 months     BP Readings from Last 3 Encounters:   08/09/23 134/84   04/11/23 120/78   10/07/22 116/59                 Passed - Recent (12 mo) or future (30 days) visit within the authorizing provider's specialty     Patient has had an office visit with the authorizing provider or a provider within the authorizing providers department within the previous 12 mos or has a future within next 30 days. See \"Patient Info\" tab in inbasket, or \"Choose Columns\" in Meds & Orders section of the refill encounter.              Passed - Medication is active on med list        Passed - Patient is age 18 or older        Passed - No active pregancy on record        Passed - Normal serum potassium on file in past 12 months     Recent Labs   Lab Test 08/09/23  1222   POTASSIUM 3.9                    Passed - Normal serum sodium on file in past 12 months     Recent Labs   Lab Test 08/09/23  1222                 Passed - No positive pregnancy test in past 12 months             Shani Estrella 08/23/23 12:50 PM  "

## 2023-08-24 RX ORDER — METHOCARBAMOL 500 MG/1
TABLET, FILM COATED ORAL
Qty: 90 TABLET | Refills: 1 | Status: SHIPPED | OUTPATIENT
Start: 2023-08-24 | End: 2024-08-15

## 2023-08-24 RX ORDER — MINOXIDIL 2 %
SOLUTION, NON-ORAL TOPICAL 2 TIMES DAILY
Qty: 60 ML | Refills: 2 | Status: SHIPPED | OUTPATIENT
Start: 2023-08-24 | End: 2024-03-06

## 2023-08-24 RX ORDER — CELECOXIB 100 MG/1
100 CAPSULE ORAL 2 TIMES DAILY
Qty: 180 CAPSULE | Refills: 1 | Status: SHIPPED | OUTPATIENT
Start: 2023-08-24 | End: 2024-02-22

## 2023-08-24 RX ORDER — HYDROCHLOROTHIAZIDE 50 MG/1
50 TABLET ORAL DAILY
Qty: 90 TABLET | Refills: 3 | Status: SHIPPED | OUTPATIENT
Start: 2023-08-24 | End: 2024-08-15

## 2023-08-24 RX ORDER — FLUTICASONE PROPIONATE 50 MCG
2 SPRAY, SUSPENSION (ML) NASAL DAILY PRN
OUTPATIENT
Start: 2023-08-24

## 2023-08-24 RX ORDER — SPIRONOLACTONE 100 MG/1
100 TABLET, FILM COATED ORAL DAILY
Qty: 90 TABLET | Refills: 3 | Status: SHIPPED | OUTPATIENT
Start: 2023-08-24 | End: 2024-08-15

## 2023-08-25 NOTE — TELEPHONE ENCOUNTER
Prescriptions resent to new pharmacy as requested.   Please note, flonase was a historical med, I have not previously prescribed this medication, so it was not sent to the [facility] pharmacy.    Damaris Son,          1. Morbid obesity (H)  - buPROPion (WELLBUTRIN XL) 150 MG 24 hr tablet; Take 1 tablet (150 mg) by mouth every morning  Dispense: 90 tablet; Refill: 3    2. Knee pain, unspecified chronicity, unspecified laterality  3. Low back pain, unspecified back pain laterality, unspecified chronicity, unspecified whether sciatica present  - celecoxib (CELEBREX) 100 MG capsule; Take 1 capsule (100 mg) by mouth 2 times daily  Dispense: 180 capsule; Refill: 1    4. Other chronic pain  5. Nonintractable episodic headache, unspecified headache type  - methocarbamol (ROBAXIN) 500 MG tablet; TAKE ONE TABLET BY MOUTH ONCE DAILY AS NEEDED FOR MUSCLE SPASMS  Dispense: 90 tablet; Refill: 1    6. Hypertension, unspecified type  - hydrochlorothiazide (HYDRODIURIL) 50 MG tablet; Take 1 tablet (50 mg) by mouth daily  Dispense: 90 tablet; Refill: 3    7. Female pattern hair loss  - minoxidil (ROGAINE WOMENS) 2 % external solution; Apply topically 2 times daily  Dispense: 60 mL; Refill: 2  - spironolactone (ALDACTONE) 100 MG tablet; Take 1 tablet (100 mg) by mouth daily  Dispense: 90 tablet; Refill: 3    8. Restless legs syndrome (RLS)  - rOPINIRole (REQUIP) 0.25 MG tablet; Take 1 tablet (0.25 mg) by mouth 2 times daily 6PM, 11PM  Dispense: 180 tablet; Refill: 3

## 2023-08-29 ENCOUNTER — TRANSFERRED RECORDS (OUTPATIENT)
Dept: HEALTH INFORMATION MANAGEMENT | Facility: CLINIC | Age: 48
End: 2023-08-29
Payer: COMMERCIAL

## 2023-09-06 DIAGNOSIS — G43.909 MIGRAINE WITHOUT STATUS MIGRAINOSUS, NOT INTRACTABLE, UNSPECIFIED MIGRAINE TYPE: ICD-10-CM

## 2023-09-08 RX ORDER — PROPRANOLOL HYDROCHLORIDE 80 MG/1
80 CAPSULE, EXTENDED RELEASE ORAL DAILY
Qty: 90 CAPSULE | Refills: 3 | Status: SHIPPED | OUTPATIENT
Start: 2023-09-08 | End: 2024-08-15

## 2023-10-19 ENCOUNTER — ANCILLARY PROCEDURE (OUTPATIENT)
Dept: MAMMOGRAPHY | Facility: CLINIC | Age: 48
End: 2023-10-19
Attending: FAMILY MEDICINE
Payer: COMMERCIAL

## 2023-10-19 DIAGNOSIS — Z12.31 VISIT FOR SCREENING MAMMOGRAM: ICD-10-CM

## 2023-10-19 PROCEDURE — 77067 SCR MAMMO BI INCL CAD: CPT

## 2023-10-23 DIAGNOSIS — Z79.899 CONTROLLED SUBSTANCE AGREEMENT SIGNED: ICD-10-CM

## 2023-10-23 DIAGNOSIS — G25.81 RESTLESS LEGS SYNDROME (RLS): ICD-10-CM

## 2023-10-23 DIAGNOSIS — G47.00 INSOMNIA, UNSPECIFIED TYPE: ICD-10-CM

## 2023-10-24 RX ORDER — ZOLPIDEM TARTRATE 5 MG/1
TABLET ORAL
Qty: 30 TABLET | Refills: 0 | Status: SHIPPED | OUTPATIENT
Start: 2023-10-26 | End: 2023-11-22

## 2023-10-24 RX ORDER — CLONAZEPAM 1 MG/1
TABLET ORAL
Qty: 30 TABLET | Refills: 0 | Status: SHIPPED | OUTPATIENT
Start: 2023-10-26 | End: 2023-11-22

## 2023-10-24 NOTE — TELEPHONE ENCOUNTER
1. Insomnia, unspecified type  - clonazePAM (KLONOPIN) 1 MG tablet; TAKE ONE TABLET BY MOUTH EVERY NIGHT AT BEDTIME  Dispense: 30 tablet; Refill: 0  - zolpidem (AMBIEN) 5 MG tablet; TAKE ONE TABLET BY MOUTH EVERY NIGHT AT BEDTIME AS NEEDED FOR SLEEP  Dispense: 30 tablet; Refill: 0    2. Controlled substance agreement signed - 8/9/23  - clonazePAM (KLONOPIN) 1 MG tablet; TAKE ONE TABLET BY MOUTH EVERY NIGHT AT BEDTIME  Dispense: 30 tablet; Refill: 0  - zolpidem (AMBIEN) 5 MG tablet; TAKE ONE TABLET BY MOUTH EVERY NIGHT AT BEDTIME AS NEEDED FOR SLEEP  Dispense: 30 tablet; Refill: 0    3. Restless legs syndrome (RLS)  - clonazePAM (KLONOPIN) 1 MG tablet; TAKE ONE TABLET BY MOUTH EVERY NIGHT AT BEDTIME  Dispense: 30 tablet; Refill: 0     Reviewed [place] .  Due for refills.  Next visit scheduled. CSA up to date.  Prescription sent to pharmacy.    Damaris Son DO

## 2023-11-14 ENCOUNTER — TRANSFERRED RECORDS (OUTPATIENT)
Dept: HEALTH INFORMATION MANAGEMENT | Facility: CLINIC | Age: 48
End: 2023-11-14
Payer: COMMERCIAL

## 2023-11-16 ENCOUNTER — MYC MEDICAL ADVICE (OUTPATIENT)
Dept: FAMILY MEDICINE | Facility: CLINIC | Age: 48
End: 2023-11-16
Payer: COMMERCIAL

## 2023-11-16 NOTE — TELEPHONE ENCOUNTER
I am not familiar with fibromyalgia causing fevers.  Curious if there is another process going on?  Can you triage this further, may need to be evaluated in person.  Damaris Son, DO

## 2023-11-22 DIAGNOSIS — G25.81 RESTLESS LEGS SYNDROME (RLS): ICD-10-CM

## 2023-11-22 DIAGNOSIS — Z79.899 CONTROLLED SUBSTANCE AGREEMENT SIGNED: ICD-10-CM

## 2023-11-22 DIAGNOSIS — G47.00 INSOMNIA, UNSPECIFIED TYPE: ICD-10-CM

## 2023-11-22 RX ORDER — CLONAZEPAM 1 MG/1
1 TABLET ORAL EVERY MORNING
Qty: 30 TABLET | Refills: 0 | Status: SHIPPED | OUTPATIENT
Start: 2023-11-26 | End: 2023-12-19

## 2023-11-22 RX ORDER — ZOLPIDEM TARTRATE 5 MG/1
TABLET ORAL
Qty: 30 TABLET | Refills: 0 | Status: SHIPPED | OUTPATIENT
Start: 2023-11-26 | End: 2023-12-19

## 2023-11-22 NOTE — TELEPHONE ENCOUNTER
1. Insomnia, unspecified type  - clonazePAM (KLONOPIN) 1 MG tablet; Take 1 tablet (1 mg) by mouth every morning  Dispense: 30 tablet; Refill: 0  - zolpidem (AMBIEN) 5 MG tablet; TAKE ONE TABLET BY MOUTH EVERY NIGHT AT BEDTIME AS NEEDED FOR SLEEP  Dispense: 30 tablet; Refill: 0    2. Controlled substance agreement signed - 8/9/23  - clonazePAM (KLONOPIN) 1 MG tablet; Take 1 tablet (1 mg) by mouth every morning  Dispense: 30 tablet; Refill: 0  - zolpidem (AMBIEN) 5 MG tablet; TAKE ONE TABLET BY MOUTH EVERY NIGHT AT BEDTIME AS NEEDED FOR SLEEP  Dispense: 30 tablet; Refill: 0    3. Restless legs syndrome (RLS)  - clonazePAM (KLONOPIN) 1 MG tablet; Take 1 tablet (1 mg) by mouth every morning  Dispense: 30 tablet; Refill: 0     Reviewed [place] , coming due for appointment.  CSA up-to-date.  Next med check scheduled.  Refill sent to pharmacy.    Damaris Son DO

## 2023-11-28 DIAGNOSIS — M79.7 FIBROMYALGIA: Primary | ICD-10-CM

## 2023-11-29 RX ORDER — TIZANIDINE 2 MG/1
TABLET ORAL
Qty: 30 TABLET | Refills: 0 | Status: SHIPPED | OUTPATIENT
Start: 2023-11-29 | End: 2023-12-27

## 2023-11-29 NOTE — TELEPHONE ENCOUNTER
1. Fibromyalgia  - tiZANidine (ZANAFLEX) 2 MG tablet; Take 1/2 to 1 tablet by mouth every night at bedtime as needed for muscle spasms.  Dispense: 30 tablet; Refill: 0     Refill sent to pharmacy.    Damaris Son, DO

## 2023-12-05 ENCOUNTER — TRANSFERRED RECORDS (OUTPATIENT)
Dept: HEALTH INFORMATION MANAGEMENT | Facility: CLINIC | Age: 48
End: 2023-12-05

## 2023-12-05 ENCOUNTER — IMMUNIZATION (OUTPATIENT)
Dept: FAMILY MEDICINE | Facility: CLINIC | Age: 48
End: 2023-12-05
Payer: COMMERCIAL

## 2023-12-05 PROCEDURE — 90480 ADMN SARSCOV2 VAC 1/ONLY CMP: CPT

## 2023-12-05 PROCEDURE — 90471 IMMUNIZATION ADMIN: CPT

## 2023-12-05 PROCEDURE — 91320 SARSCV2 VAC 30MCG TRS-SUC IM: CPT

## 2023-12-05 PROCEDURE — 90686 IIV4 VACC NO PRSV 0.5 ML IM: CPT

## 2023-12-06 ENCOUNTER — E-VISIT (OUTPATIENT)
Dept: FAMILY MEDICINE | Facility: CLINIC | Age: 48
End: 2023-12-06
Payer: COMMERCIAL

## 2023-12-06 DIAGNOSIS — G25.81 RESTLESS LEGS SYNDROME (RLS): ICD-10-CM

## 2023-12-06 PROCEDURE — 99421 OL DIG E/M SVC 5-10 MIN: CPT | Performed by: FAMILY MEDICINE

## 2023-12-06 RX ORDER — ROPINIROLE 0.5 MG/1
TABLET, FILM COATED ORAL
Qty: 180 TABLET | Refills: 3 | Status: SHIPPED | OUTPATIENT
Start: 2023-12-06 | End: 2024-08-15

## 2023-12-07 NOTE — PATIENT INSTRUCTIONS
Thank you for choosing us for your care. I have placed an order for a prescription so that you can start treatment. View your full visit summary for details by clicking on the link below. Your pharmacist will able to address any questions you may have about the medication.     If you're not feeling better within 5-7 days, please schedule an appointment.  You can schedule an appointment right here in [facility], or call [phone]  If the visit is for the same symptoms as your eVisit, we'll refund the cost of your eVisit if seen within seven days.

## 2023-12-08 ENCOUNTER — TRANSFERRED RECORDS (OUTPATIENT)
Dept: HEALTH INFORMATION MANAGEMENT | Facility: CLINIC | Age: 48
End: 2023-12-08
Payer: COMMERCIAL

## 2023-12-14 LAB — NONINV COLON CA DNA+OCC BLD SCRN STL QL: NEGATIVE

## 2023-12-14 NOTE — RESULT ENCOUNTER NOTE
Patient updated by [employer] message with lab results.       Dawit Farfan,  Thank you for completing your cologuard test for colon cancer screening. It has returned normal (negative).  We should plan to repeat colon cancer screening in 3 years, sooner evaluation if concerning symptoms arise.  Damrais Son, DO

## 2023-12-23 DIAGNOSIS — M79.7 FIBROMYALGIA: ICD-10-CM

## 2023-12-27 RX ORDER — TIZANIDINE 2 MG/1
TABLET ORAL
Qty: 30 TABLET | Refills: 0 | Status: SHIPPED | OUTPATIENT
Start: 2023-12-27 | End: 2024-08-15

## 2023-12-27 NOTE — TELEPHONE ENCOUNTER
1. Fibromyalgia  - tiZANidine (ZANAFLEX) 2 MG tablet; Take 1/2 to 1 tablet by mouth every night at bedtime as needed for muscle spasms.  Dispense: 30 tablet; Refill: 0     Refill sent to pharmacy.

## 2024-01-17 DIAGNOSIS — Z79.899 CONTROLLED SUBSTANCE AGREEMENT SIGNED: ICD-10-CM

## 2024-01-17 DIAGNOSIS — G47.00 INSOMNIA, UNSPECIFIED TYPE: ICD-10-CM

## 2024-01-17 DIAGNOSIS — G25.81 RESTLESS LEGS SYNDROME (RLS): ICD-10-CM

## 2024-01-17 RX ORDER — ZOLPIDEM TARTRATE 5 MG/1
TABLET ORAL
Qty: 30 TABLET | Refills: 0 | Status: SHIPPED | OUTPATIENT
Start: 2024-01-21 | End: 2024-02-22

## 2024-01-17 RX ORDER — CLONAZEPAM 1 MG/1
1 TABLET ORAL EVERY MORNING
Qty: 30 TABLET | Refills: 0 | Status: SHIPPED | OUTPATIENT
Start: 2024-01-21 | End: 2024-02-22

## 2024-01-17 NOTE — TELEPHONE ENCOUNTER
1. Insomnia, unspecified type  - clonazePAM (KLONOPIN) 1 MG tablet; Take 1 tablet (1 mg) by mouth every morning  Dispense: 30 tablet; Refill: 0  - zolpidem (AMBIEN) 5 MG tablet; TAKE ONE TABLET BY MOUTH EVERY NIGHT AT BEDTIME AS NEEDED FOR SLEEP  Dispense: 30 tablet; Refill: 0    2. Controlled substance agreement signed - 8/9/23  - clonazePAM (KLONOPIN) 1 MG tablet; Take 1 tablet (1 mg) by mouth every morning  Dispense: 30 tablet; Refill: 0  - zolpidem (AMBIEN) 5 MG tablet; TAKE ONE TABLET BY MOUTH EVERY NIGHT AT BEDTIME AS NEEDED FOR SLEEP  Dispense: 30 tablet; Refill: 0    3. Restless legs syndrome (RLS)  - clonazePAM (KLONOPIN) 1 MG tablet; Take 1 tablet (1 mg) by mouth every morning  Dispense: 30 tablet; Refill: 0     Reviewed [place] , is coming due for refills.  Next med check scheduled.  Refill sent to pharmacy.    Damaris Son DO

## 2024-02-13 ENCOUNTER — TRANSFERRED RECORDS (OUTPATIENT)
Dept: HEALTH INFORMATION MANAGEMENT | Facility: CLINIC | Age: 49
End: 2024-02-13
Payer: COMMERCIAL

## 2024-02-21 DIAGNOSIS — G25.81 RESTLESS LEGS SYNDROME (RLS): ICD-10-CM

## 2024-02-21 DIAGNOSIS — Z79.899 CONTROLLED SUBSTANCE AGREEMENT SIGNED: ICD-10-CM

## 2024-02-21 DIAGNOSIS — G47.00 INSOMNIA, UNSPECIFIED TYPE: ICD-10-CM

## 2024-02-21 DIAGNOSIS — M25.569 KNEE PAIN, UNSPECIFIED CHRONICITY, UNSPECIFIED LATERALITY: ICD-10-CM

## 2024-02-21 DIAGNOSIS — M54.50 LOW BACK PAIN, UNSPECIFIED BACK PAIN LATERALITY, UNSPECIFIED CHRONICITY, UNSPECIFIED WHETHER SCIATICA PRESENT: ICD-10-CM

## 2024-02-22 RX ORDER — ZOLPIDEM TARTRATE 5 MG/1
TABLET ORAL
Qty: 30 TABLET | Refills: 0 | Status: SHIPPED | OUTPATIENT
Start: 2024-02-22 | End: 2024-03-21

## 2024-02-22 RX ORDER — CLONAZEPAM 1 MG/1
1 TABLET ORAL EVERY MORNING
Qty: 30 TABLET | Refills: 0 | Status: SHIPPED | OUTPATIENT
Start: 2024-02-22 | End: 2024-03-21

## 2024-02-22 RX ORDER — CELECOXIB 100 MG/1
100 CAPSULE ORAL 2 TIMES DAILY
Qty: 60 CAPSULE | Refills: 0 | Status: SHIPPED | OUTPATIENT
Start: 2024-02-22 | End: 2024-03-28

## 2024-02-22 NOTE — TELEPHONE ENCOUNTER
1. Knee pain, unspecified chronicity, unspecified laterality  2. Low back pain, unspecified back pain laterality, unspecified chronicity, unspecified whether sciatica present  - celecoxib (CELEBREX) 100 MG capsule; Take 1 capsule by mouth twice daily.  Dispense: 60 capsule; Refill: 0     Overdue for surveillance labs and med check.  Yolande refill sent to pharmacy.    Damaris Son, DO

## 2024-02-22 NOTE — TELEPHONE ENCOUNTER
1. Insomnia, unspecified type  2. Controlled substance agreement signed - 8/9/23  3. Restless legs syndrome (RLS)  - clonazePAM (KLONOPIN) 1 MG tablet; TAKE ONE TABLET BY MOUTH EVERY MORNING  Dispense: 30 tablet; Refill: 0  - zolpidem (AMBIEN) 5 MG tablet; TAKE ONE TABLET BY MOUTH EVERY NIGHT AT BEDTIME AS NEEDED FOR SLEEP  Dispense: 30 tablet; Refill: 0    Reviewed [place] , due for refills. CSA up to date. Next med check scheduled. Refills sent to pharmacy.    Damaris Son DO

## 2024-03-05 ENCOUNTER — OFFICE VISIT (OUTPATIENT)
Dept: FAMILY MEDICINE | Facility: CLINIC | Age: 49
End: 2024-03-05
Payer: COMMERCIAL

## 2024-03-05 ENCOUNTER — TRANSFERRED RECORDS (OUTPATIENT)
Dept: HEALTH INFORMATION MANAGEMENT | Facility: CLINIC | Age: 49
End: 2024-03-05

## 2024-03-05 VITALS
OXYGEN SATURATION: 96 % | BODY MASS INDEX: 47.09 KG/M2 | HEIGHT: 66 IN | TEMPERATURE: 98.1 F | HEART RATE: 88 BPM | RESPIRATION RATE: 20 BRPM | SYSTOLIC BLOOD PRESSURE: 133 MMHG | WEIGHT: 293 LBS | DIASTOLIC BLOOD PRESSURE: 87 MMHG

## 2024-03-05 DIAGNOSIS — L65.8 FEMALE PATTERN HAIR LOSS: ICD-10-CM

## 2024-03-05 DIAGNOSIS — Z23 IMMUNIZATION DUE: ICD-10-CM

## 2024-03-05 DIAGNOSIS — G25.81 RESTLESS LEGS SYNDROME (RLS): ICD-10-CM

## 2024-03-05 DIAGNOSIS — E66.01 MORBID OBESITY (H): ICD-10-CM

## 2024-03-05 DIAGNOSIS — I10 PRIMARY HYPERTENSION: ICD-10-CM

## 2024-03-05 DIAGNOSIS — M79.2 NEUROPATHIC PAIN: ICD-10-CM

## 2024-03-05 DIAGNOSIS — Z79.899 MEDICATION MANAGEMENT: Primary | ICD-10-CM

## 2024-03-05 DIAGNOSIS — G47.00 INSOMNIA, UNSPECIFIED TYPE: ICD-10-CM

## 2024-03-05 DIAGNOSIS — Z79.899 CONTROLLED SUBSTANCE AGREEMENT SIGNED: ICD-10-CM

## 2024-03-05 DIAGNOSIS — G43.909 MIGRAINE WITHOUT STATUS MIGRAINOSUS, NOT INTRACTABLE, UNSPECIFIED MIGRAINE TYPE: ICD-10-CM

## 2024-03-05 LAB — HOLD SPECIMEN: NORMAL

## 2024-03-05 PROCEDURE — 99214 OFFICE O/P EST MOD 30 MIN: CPT | Performed by: FAMILY MEDICINE

## 2024-03-05 PROCEDURE — 87340 HEPATITIS B SURFACE AG IA: CPT | Performed by: FAMILY MEDICINE

## 2024-03-05 PROCEDURE — 36415 COLL VENOUS BLD VENIPUNCTURE: CPT | Performed by: FAMILY MEDICINE

## 2024-03-05 PROCEDURE — 86706 HEP B SURFACE ANTIBODY: CPT | Performed by: FAMILY MEDICINE

## 2024-03-05 PROCEDURE — G2211 COMPLEX E/M VISIT ADD ON: HCPCS | Performed by: FAMILY MEDICINE

## 2024-03-05 PROCEDURE — 80048 BASIC METABOLIC PNL TOTAL CA: CPT | Performed by: FAMILY MEDICINE

## 2024-03-05 RX ORDER — DICLOFENAC SODIUM 75 MG/1
TABLET, DELAYED RELEASE ORAL
COMMUNITY
Start: 2024-02-21 | End: 2024-08-15

## 2024-03-05 RX ORDER — VENLAFAXINE 37.5 MG/1
TABLET ORAL
COMMUNITY
Start: 2024-02-14

## 2024-03-05 RX ORDER — BACLOFEN 10 MG/1
TABLET ORAL
COMMUNITY
Start: 2024-02-21

## 2024-03-05 RX ORDER — UBROGEPANT 50 MG/1
TABLET ORAL
COMMUNITY
Start: 2023-08-14

## 2024-03-05 ASSESSMENT — ANXIETY QUESTIONNAIRES
IF YOU CHECKED OFF ANY PROBLEMS ON THIS QUESTIONNAIRE, HOW DIFFICULT HAVE THESE PROBLEMS MADE IT FOR YOU TO DO YOUR WORK, TAKE CARE OF THINGS AT HOME, OR GET ALONG WITH OTHER PEOPLE: SOMEWHAT DIFFICULT
GAD7 TOTAL SCORE: 4
8. IF YOU CHECKED OFF ANY PROBLEMS, HOW DIFFICULT HAVE THESE MADE IT FOR YOU TO DO YOUR WORK, TAKE CARE OF THINGS AT HOME, OR GET ALONG WITH OTHER PEOPLE?: SOMEWHAT DIFFICULT
1. FEELING NERVOUS, ANXIOUS, OR ON EDGE: MORE THAN HALF THE DAYS
7. FEELING AFRAID AS IF SOMETHING AWFUL MIGHT HAPPEN: NOT AT ALL
2. NOT BEING ABLE TO STOP OR CONTROL WORRYING: NOT AT ALL
7. FEELING AFRAID AS IF SOMETHING AWFUL MIGHT HAPPEN: NOT AT ALL
GAD7 TOTAL SCORE: 4
3. WORRYING TOO MUCH ABOUT DIFFERENT THINGS: NOT AT ALL
4. TROUBLE RELAXING: SEVERAL DAYS
5. BEING SO RESTLESS THAT IT IS HARD TO SIT STILL: SEVERAL DAYS
6. BECOMING EASILY ANNOYED OR IRRITABLE: NOT AT ALL
GAD7 TOTAL SCORE: 4

## 2024-03-05 ASSESSMENT — PATIENT HEALTH QUESTIONNAIRE - PHQ9
SUM OF ALL RESPONSES TO PHQ QUESTIONS 1-9: 14
10. IF YOU CHECKED OFF ANY PROBLEMS, HOW DIFFICULT HAVE THESE PROBLEMS MADE IT FOR YOU TO DO YOUR WORK, TAKE CARE OF THINGS AT HOME, OR GET ALONG WITH OTHER PEOPLE: EXTREMELY DIFFICULT
SUM OF ALL RESPONSES TO PHQ QUESTIONS 1-9: 14

## 2024-03-05 NOTE — PROGRESS NOTES
Assessment & Plan     1. Medication management  - Basic metabolic panel  (Ca, Cl, CO2, Creat, Gluc, K, Na, BUN)    Recheck BMP due to regular NSAID use. Will refill Celebrex if renal function stable.     2. Morbid obesity (H)  Has lost approximately 25 lbs over the last year. Keep working on healthy lifestyle modifications.   Consider referral for comprehensive weight management clinic.     3. Controlled substance agreement signed - 8/9/23  Doing well on Ambien and Clonazepam. Aware of risks of combining these medications.     4. Insomnia, unspecified type  Continue Ambien.     5. Primary hypertension  Blood pressure at goal. Continue current regimen.     6. Restless legs syndrome (RLS)  Symptoms adequately controlled. Continue clonazepam, ropinirole, pregabalin, magnesium supplementation.     7. Neuropathic pain  Continue pregabalin, working with pain clinic.     8. Migraine without status migrainosus, not intractable, unspecified migraine type  Working with pain clinic, continue propranolol (other meds managed by pain clinic).     9. Female pattern hair loss  Noticing improvements with spironolactone and minoxidil. Continue current regimen.     10. Immunization due  - Hepatitis B Surface Antibody  - Hepatitis B surface antigen      Unsure if previously vaccinated. Would like to check antibodies. Would consider immunization if not immune.     The longitudinal plan of care for the diagnosis(es)/condition(s) as documented were addressed during this visit. Due to the added complexity in care, I will continue to support Naheed in the subsequent management and with ongoing continuity of care.      Subjective   Naheed is a 48 year old, presenting for the following health issues:  Recheck Medication      3/5/2024     5:01 PM   Additional Questions   Roomed by Ivette GALLARDO CMA   Accompanied by      History of Present Illness       She eats 2-3 servings of fruits and vegetables daily.She consumes 0 sweetened beverage(s)  daily.She exercises with enough effort to increase her heart rate 9 or less minutes per day.  She exercises with enough effort to increase her heart rate 3 or less days per week.   She is taking medications regularly.     CSA MED CHECK:   - Clonazepam: doing about as well as she thinks she will be   - Ambien: ditto    Symptoms worse when more stressed at work. Status quo.       PAIN:   - Tizanidine: will take at nighttime.    - methocarbamol: uses mid day, doesn't make her sleepy. Ortho just refilled, upped dose.    - baclofen: Epidural steroid injection, prescribed to help with reactions to injections.  - Celebrex: 100mg BID. Tolerating fine.     New back issue. Just came from [place]. Has MRI ordered. Referred pain, right above bra line into sternum. Feels hard to take a deep breath. Experiencing waves of nausea. Worse when hands are extended or standing. Breaking into sweat, nauseated, will vomit or faint.      Had been feeling really good for 4 month stretch.       MIGRAINES:   Propranolol: thinks going okay.  Tried to wean off in the fall but didn't do well.       MOOD: wellbutrin 150mg daily, worse lately because not feeling as well.         4/11/2023    12:29 PM 8/9/2023     9:47 AM 3/5/2024     4:52 PM   PHQ   PHQ-9 Total Score 5 8 14   Q9: Thoughts of better off dead/self-harm past 2 weeks Not at all Not at all Not at all         4/11/2023    12:30 PM 8/9/2023     9:47 AM 3/5/2024     4:52 PM   CHANCE-7 SCORE   Total Score 3 (minimal anxiety) 4 (minimal anxiety) 4 (minimal anxiety)   Total Score 3 4 4       HYPERTENSION:   BP Readings from Last 6 Encounters:   03/05/24 133/87   08/09/23 134/84   04/11/23 120/78   10/07/22 116/59   09/21/22 128/84   07/29/22 139/85     Last Comprehensive Metabolic Panel:  Lab Results   Component Value Date     08/09/2023    POTASSIUM 3.9 08/09/2023    CHLORIDE 101 08/09/2023    CO2 27 08/09/2023    ANIONGAP 14 08/09/2023    GLC 96 08/09/2023    BUN 15.9 08/09/2023    CR  "0.99 (H) 08/09/2023    GFRESTIMATED 71 08/09/2023    MABEL 10.0 08/09/2023       MINOXIDIL: noticing hair growth, improving.       WEIGHT:   Body mass index is 57.39 kg/m .  Wt Readings from Last 4 Encounters:   03/05/24 (!) 162.5 kg (358 lb 4 oz)   08/09/23 (!) 164.2 kg (362 lb)   04/11/23 (!) 174.4 kg (384 lb 8 oz)   10/06/22 (!) 167.7 kg (369 lb 11.2 oz)     Down about 25 lbs from April.       Review of Systems  See HPI above.       Objective    BP (!) 143/93 (BP Location: Left arm, Patient Position: Sitting, Cuff Size: Adult Regular)   Pulse 91   Temp 98.1  F (36.7  C) (Oral)   Resp 20   Ht 1.683 m (5' 6.25\")   Wt (!) 162.5 kg (358 lb 4 oz)   LMP  (LMP Unknown)   SpO2 96%   BMI 57.39 kg/m    Body mass index is 57.39 kg/m .  Physical Exam   GENERAL: alert and no distress  NECK: no adenopathy, no asymmetry, masses, or scars  RESP: lungs clear to auscultation - no rales, rhonchi or wheezes  CV: regular rate and rhythm, no murmurs  ABDOMEN: soft, nontender, no hepatosplenomegaly, no masses and bowel sounds normal  MS: no gross musculoskeletal defects noted, no edema            Signed Electronically by: Damaris Son, DO    "

## 2024-03-06 DIAGNOSIS — L65.8 FEMALE PATTERN HAIR LOSS: ICD-10-CM

## 2024-03-06 LAB
ANION GAP SERPL CALCULATED.3IONS-SCNC: 14 MMOL/L (ref 7–15)
BUN SERPL-MCNC: 15.4 MG/DL (ref 6–20)
CALCIUM SERPL-MCNC: 9.6 MG/DL (ref 8.6–10)
CHLORIDE SERPL-SCNC: 103 MMOL/L (ref 98–107)
CREAT SERPL-MCNC: 1.09 MG/DL (ref 0.51–0.95)
DEPRECATED HCO3 PLAS-SCNC: 24 MMOL/L (ref 22–29)
EGFRCR SERPLBLD CKD-EPI 2021: 63 ML/MIN/1.73M2
GLUCOSE SERPL-MCNC: 196 MG/DL (ref 70–99)
HBV SURFACE AG SERPL QL IA: NONREACTIVE
POTASSIUM SERPL-SCNC: 4.4 MMOL/L (ref 3.4–5.3)
SODIUM SERPL-SCNC: 141 MMOL/L (ref 135–145)

## 2024-03-06 RX ORDER — MINOXIDIL 2 %
SOLUTION, NON-ORAL TOPICAL 2 TIMES DAILY
Qty: 60 ML | Refills: 1 | Status: SHIPPED | OUTPATIENT
Start: 2024-03-06 | End: 2024-04-21

## 2024-03-06 NOTE — TELEPHONE ENCOUNTER
1. Female pattern hair loss  - ROGAINE WOMENS 2 % external solution; Apply topically twice daily.  Dispense: 60 mL; Refill: 1     Responding to treatment.  Discussed at recent appointment.  Continue current treatment plan.    Damaris Son, DO

## 2024-03-07 LAB
HBV SURFACE AB SERPL IA-ACNC: <3.5 M[IU]/ML
HBV SURFACE AB SERPL IA-ACNC: NONREACTIVE M[IU]/ML

## 2024-03-07 NOTE — RESULT ENCOUNTER NOTE
Patient updated by [employer] message with lab results.      Dawit Farfan,  1. Your creatinine is up a touch from last check though still near baseline. Continue current treatment plan. We will continue to monitor. If GFR drops below 60, we will need to discontinue Celebrex.   2. You are not immune to hepatitis B. You can schedule a nurse visit or check with pharmacy if you would like to complete the hepatitis B series.  Damaris Son, DO

## 2024-03-19 ENCOUNTER — TRANSFERRED RECORDS (OUTPATIENT)
Dept: HEALTH INFORMATION MANAGEMENT | Facility: CLINIC | Age: 49
End: 2024-03-19
Payer: COMMERCIAL

## 2024-03-20 DIAGNOSIS — Z79.899 CONTROLLED SUBSTANCE AGREEMENT SIGNED: ICD-10-CM

## 2024-03-20 DIAGNOSIS — G47.00 INSOMNIA, UNSPECIFIED TYPE: ICD-10-CM

## 2024-03-20 DIAGNOSIS — G25.81 RESTLESS LEGS SYNDROME (RLS): ICD-10-CM

## 2024-03-21 RX ORDER — CLONAZEPAM 1 MG/1
1 TABLET ORAL EVERY MORNING
Qty: 30 TABLET | Refills: 0 | Status: SHIPPED | OUTPATIENT
Start: 2024-03-23 | End: 2024-04-21

## 2024-03-21 RX ORDER — ZOLPIDEM TARTRATE 5 MG/1
TABLET ORAL
Qty: 30 TABLET | Refills: 0 | Status: SHIPPED | OUTPATIENT
Start: 2024-03-23 | End: 2024-04-21

## 2024-03-21 NOTE — TELEPHONE ENCOUNTER
1. Insomnia, unspecified type  - clonazePAM (KLONOPIN) 1 MG tablet; Take 1 tablet (1 mg) by mouth every morning  Dispense: 30 tablet; Refill: 0  - zolpidem (AMBIEN) 5 MG tablet; TAKE ONE TABLET BY MOUTH EVERY NIGHT AT BEDTIME AS NEEDED FOR SLEEP  Dispense: 30 tablet; Refill: 0    2. Controlled substance agreement signed - 8/9/23  - clonazePAM (KLONOPIN) 1 MG tablet; Take 1 tablet (1 mg) by mouth every morning  Dispense: 30 tablet; Refill: 0  - zolpidem (AMBIEN) 5 MG tablet; TAKE ONE TABLET BY MOUTH EVERY NIGHT AT BEDTIME AS NEEDED FOR SLEEP  Dispense: 30 tablet; Refill: 0    3. Restless legs syndrome (RLS)  - clonazePAM (KLONOPIN) 1 MG tablet; Take 1 tablet (1 mg) by mouth every morning  Dispense: 30 tablet; Refill: 0     Reviewed [place] , coming due for refill.  CSA up-to-date.  Med checks up-to-date.  Next med check scheduled.  Refill sent to pharmacy.    Damaris Son DO

## 2024-03-24 ENCOUNTER — E-VISIT (OUTPATIENT)
Dept: FAMILY MEDICINE | Facility: CLINIC | Age: 49
End: 2024-03-24
Payer: COMMERCIAL

## 2024-03-24 DIAGNOSIS — K08.89 PAIN, DENTAL: Primary | ICD-10-CM

## 2024-03-24 PROCEDURE — 99207 PR NON-BILLABLE SERV PER CHARTING: CPT | Performed by: FAMILY MEDICINE

## 2024-03-28 DIAGNOSIS — M54.50 LOW BACK PAIN, UNSPECIFIED BACK PAIN LATERALITY, UNSPECIFIED CHRONICITY, UNSPECIFIED WHETHER SCIATICA PRESENT: ICD-10-CM

## 2024-03-28 DIAGNOSIS — M25.569 KNEE PAIN, UNSPECIFIED CHRONICITY, UNSPECIFIED LATERALITY: ICD-10-CM

## 2024-03-28 RX ORDER — CELECOXIB 100 MG/1
CAPSULE ORAL
Qty: 180 CAPSULE | Refills: 1 | Status: SHIPPED | OUTPATIENT
Start: 2024-03-28 | End: 2024-08-15

## 2024-03-28 NOTE — TELEPHONE ENCOUNTER
1. Knee pain, unspecified chronicity, unspecified laterality  2. Low back pain, unspecified back pain laterality, unspecified chronicity, unspecified whether sciatica present  - celecoxib (CELEBREX) 100 MG capsule; Take 1 capsule by mouth twice daily. \ Dispense: 180 capsule; Refill: 1     Refill sent to pharmacy, reassess renal function prior to next prescription renewal.     GFR Estimate   Date Value Ref Range Status   03/05/2024 63 >60 mL/min/1.73m2 Final   06/28/2019 >60 >60 mL/min/1.73m2 Final   11/23/2015 75 >60 mL/min/1.7m2 Final     Comment:     Non  GFR Calc     Damaris Son, DO

## 2024-04-20 DIAGNOSIS — Z79.899 CONTROLLED SUBSTANCE AGREEMENT SIGNED: ICD-10-CM

## 2024-04-20 DIAGNOSIS — G25.81 RESTLESS LEGS SYNDROME (RLS): ICD-10-CM

## 2024-04-20 DIAGNOSIS — G47.00 INSOMNIA, UNSPECIFIED TYPE: ICD-10-CM

## 2024-04-20 DIAGNOSIS — L65.8 FEMALE PATTERN HAIR LOSS: ICD-10-CM

## 2024-04-21 RX ORDER — MINOXIDIL 2 %
SOLUTION, NON-ORAL TOPICAL 2 TIMES DAILY
Qty: 60 ML | Refills: 3 | Status: SHIPPED | OUTPATIENT
Start: 2024-04-21 | End: 2024-08-15

## 2024-04-21 RX ORDER — CLONAZEPAM 1 MG/1
1 TABLET ORAL EVERY MORNING
Qty: 30 TABLET | Refills: 0 | Status: SHIPPED | OUTPATIENT
Start: 2024-04-27 | End: 2024-05-20

## 2024-04-21 RX ORDER — ZOLPIDEM TARTRATE 5 MG/1
TABLET ORAL
Qty: 30 TABLET | Refills: 0 | Status: SHIPPED | OUTPATIENT
Start: 2024-04-27 | End: 2024-05-20

## 2024-04-22 NOTE — TELEPHONE ENCOUNTER
1. Insomnia, unspecified type  2. Controlled substance agreement signed - 8/9/23  3. Restless legs syndrome (RLS)  - clonazePAM (KLONOPIN) 1 MG tablet; Take 1 tablet (1 mg) by mouth every morning  Dispense: 30 tablet; Refill: 0  - zolpidem (AMBIEN) 5 MG tablet; TAKE ONE TABLET BY MOUTH EVERY NIGHT AT BEDTIME AS NEEDED FOR SLEEP  Dispense: 30 tablet; Refill: 0    Reviewed [place] . Coming due for refill. Prescriptions prepped to be filled when due. Next med check scheduled.    Damaris Son, DO

## 2024-04-22 NOTE — TELEPHONE ENCOUNTER
1. Female pattern hair loss  - minoxidil (ROGAINE WOMENS) 2 % external solution; Apply topically twice daily.  Dispense: 60 mL; Refill: 3     Damaris Son, DO

## 2024-04-23 ENCOUNTER — APPOINTMENT (OUTPATIENT)
Dept: CT IMAGING | Facility: HOSPITAL | Age: 49
End: 2024-04-23
Attending: EMERGENCY MEDICINE
Payer: COMMERCIAL

## 2024-04-23 ENCOUNTER — HOSPITAL ENCOUNTER (EMERGENCY)
Facility: HOSPITAL | Age: 49
Discharge: HOME | End: 2024-04-23
Attending: EMERGENCY MEDICINE | Admitting: EMERGENCY MEDICINE
Payer: COMMERCIAL

## 2024-04-23 VITALS
SYSTOLIC BLOOD PRESSURE: 136 MMHG | HEART RATE: 88 BPM | OXYGEN SATURATION: 96 % | TEMPERATURE: 96.8 F | RESPIRATION RATE: 20 BRPM | DIASTOLIC BLOOD PRESSURE: 63 MMHG

## 2024-04-23 DIAGNOSIS — L98.9 SCALP LESION: ICD-10-CM

## 2024-04-23 DIAGNOSIS — L03.811 CELLULITIS OF HEAD EXCEPT FACE: ICD-10-CM

## 2024-04-23 LAB
ALBUMIN SERPL BCG-MCNC: 4.2 G/DL (ref 3.5–5.2)
ALP SERPL-CCNC: 60 U/L (ref 40–150)
ALT SERPL W P-5'-P-CCNC: 33 U/L (ref 0–50)
ANION GAP SERPL CALCULATED.3IONS-SCNC: 14 MMOL/L (ref 7–15)
ANION GAP SERPL CALCULATED.3IONS-SCNC: 14 MMOL/L (ref 7–15)
AST SERPL W P-5'-P-CCNC: 26 U/L (ref 0–45)
BILIRUB SERPL-MCNC: 0.4 MG/DL
BUN SERPL-MCNC: 11.9 MG/DL (ref 6–20)
BUN SERPL-MCNC: 12 MG/DL (ref 6–20)
CALCIUM SERPL-MCNC: 9.2 MG/DL (ref 8.6–10)
CALCIUM SERPL-MCNC: 9.3 MG/DL (ref 8.6–10)
CHLORIDE SERPL-SCNC: 100 MMOL/L (ref 98–107)
CHLORIDE SERPL-SCNC: 98 MMOL/L (ref 98–107)
CREAT SERPL-MCNC: 1.07 MG/DL (ref 0.51–0.95)
CREAT SERPL-MCNC: 1.07 MG/DL (ref 0.51–0.95)
CRP SERPL-MCNC: 36 MG/L
DEPRECATED HCO3 PLAS-SCNC: 26 MMOL/L (ref 22–29)
DEPRECATED HCO3 PLAS-SCNC: 26 MMOL/L (ref 22–29)
EGFRCR SERPLBLD CKD-EPI 2021: 64 ML/MIN/1.73M2
EGFRCR SERPLBLD CKD-EPI 2021: 64 ML/MIN/1.73M2
ERYTHROCYTE [DISTWIDTH] IN BLOOD BY AUTOMATED COUNT: 16 % (ref 10–15)
GLUCOSE SERPL-MCNC: 165 MG/DL (ref 70–99)
GLUCOSE SERPL-MCNC: 166 MG/DL (ref 70–99)
HCT VFR BLD AUTO: 43.3 % (ref 35–47)
HGB BLD-MCNC: 14.3 G/DL (ref 11.7–15.7)
HOLD SPECIMEN: NORMAL
MCH RBC QN AUTO: 31 PG (ref 26.5–33)
MCHC RBC AUTO-ENTMCNC: 33 G/DL (ref 31.5–36.5)
MCV RBC AUTO: 94 FL (ref 78–100)
PLATELET # BLD AUTO: 323 10E3/UL (ref 150–450)
POTASSIUM SERPL-SCNC: 3.6 MMOL/L (ref 3.4–5.3)
POTASSIUM SERPL-SCNC: 3.7 MMOL/L (ref 3.4–5.3)
PROT SERPL-MCNC: 7.5 G/DL (ref 6.4–8.3)
RBC # BLD AUTO: 4.62 10E6/UL (ref 3.8–5.2)
SODIUM SERPL-SCNC: 138 MMOL/L (ref 135–145)
SODIUM SERPL-SCNC: 140 MMOL/L (ref 135–145)
WBC # BLD AUTO: 10.9 10E3/UL (ref 4–11)

## 2024-04-23 PROCEDURE — 85014 HEMATOCRIT: CPT | Performed by: STUDENT IN AN ORGANIZED HEALTH CARE EDUCATION/TRAINING PROGRAM

## 2024-04-23 PROCEDURE — 86140 C-REACTIVE PROTEIN: CPT | Performed by: EMERGENCY MEDICINE

## 2024-04-23 PROCEDURE — 250N000011 HC RX IP 250 OP 636: Performed by: EMERGENCY MEDICINE

## 2024-04-23 PROCEDURE — 258N000003 HC RX IP 258 OP 636: Performed by: EMERGENCY MEDICINE

## 2024-04-23 PROCEDURE — 70450 CT HEAD/BRAIN W/O DYE: CPT

## 2024-04-23 PROCEDURE — 96374 THER/PROPH/DIAG INJ IV PUSH: CPT

## 2024-04-23 PROCEDURE — 36415 COLL VENOUS BLD VENIPUNCTURE: CPT | Performed by: STUDENT IN AN ORGANIZED HEALTH CARE EDUCATION/TRAINING PROGRAM

## 2024-04-23 PROCEDURE — 80053 COMPREHEN METABOLIC PANEL: CPT | Performed by: STUDENT IN AN ORGANIZED HEALTH CARE EDUCATION/TRAINING PROGRAM

## 2024-04-23 PROCEDURE — 96361 HYDRATE IV INFUSION ADD-ON: CPT

## 2024-04-23 PROCEDURE — 70487 CT MAXILLOFACIAL W/DYE: CPT

## 2024-04-23 PROCEDURE — 99285 EMERGENCY DEPT VISIT HI MDM: CPT | Mod: 25

## 2024-04-23 PROCEDURE — 250N000013 HC RX MED GY IP 250 OP 250 PS 637: Performed by: EMERGENCY MEDICINE

## 2024-04-23 PROCEDURE — 96375 TX/PRO/DX INJ NEW DRUG ADDON: CPT | Mod: 59

## 2024-04-23 RX ORDER — CEPHALEXIN 500 MG/1
500 CAPSULE ORAL 4 TIMES DAILY
Qty: 28 CAPSULE | Refills: 0 | Status: SHIPPED | OUTPATIENT
Start: 2024-04-23 | End: 2024-04-30

## 2024-04-23 RX ORDER — CEPHALEXIN 500 MG/1
500 CAPSULE ORAL ONCE
Status: COMPLETED | OUTPATIENT
Start: 2024-04-23 | End: 2024-04-23

## 2024-04-23 RX ORDER — ONDANSETRON 2 MG/ML
4 INJECTION INTRAMUSCULAR; INTRAVENOUS ONCE
Status: COMPLETED | OUTPATIENT
Start: 2024-04-23 | End: 2024-04-23

## 2024-04-23 RX ORDER — IOPAMIDOL 755 MG/ML
90 INJECTION, SOLUTION INTRAVASCULAR ONCE
Status: COMPLETED | OUTPATIENT
Start: 2024-04-23 | End: 2024-04-23

## 2024-04-23 RX ADMIN — ONDANSETRON 4 MG: 2 INJECTION INTRAMUSCULAR; INTRAVENOUS at 18:17

## 2024-04-23 RX ADMIN — HYDROMORPHONE HYDROCHLORIDE 0.5 MG: 1 INJECTION, SOLUTION INTRAMUSCULAR; INTRAVENOUS; SUBCUTANEOUS at 18:21

## 2024-04-23 RX ADMIN — SODIUM CHLORIDE 1000 ML: 9 INJECTION, SOLUTION INTRAVENOUS at 18:11

## 2024-04-23 RX ADMIN — IOPAMIDOL 90 ML: 755 INJECTION, SOLUTION INTRAVENOUS at 19:20

## 2024-04-23 RX ADMIN — CEPHALEXIN 500 MG: 500 CAPSULE ORAL at 20:04

## 2024-04-23 ASSESSMENT — COLUMBIA-SUICIDE SEVERITY RATING SCALE - C-SSRS
6. HAVE YOU EVER DONE ANYTHING, STARTED TO DO ANYTHING, OR PREPARED TO DO ANYTHING TO END YOUR LIFE?: NO
1. IN THE PAST MONTH, HAVE YOU WISHED YOU WERE DEAD OR WISHED YOU COULD GO TO SLEEP AND NOT WAKE UP?: NO
2. HAVE YOU ACTUALLY HAD ANY THOUGHTS OF KILLING YOURSELF IN THE PAST MONTH?: NO

## 2024-04-23 ASSESSMENT — ACTIVITIES OF DAILY LIVING (ADL)
ADLS_ACUITY_SCORE: 38

## 2024-04-23 NOTE — ED TRIAGE NOTES
"    Patient concerned or a \"lump\" on the back of her head, she is also concerned for ongoing abscess in her left lower jaw area that she feels has not fully resolved despite two different antibiotics. Patient is concerned the lump may have something to do with her ongoing dental abscess.   "

## 2024-04-23 NOTE — ED PROVIDER NOTES
"EMERGENCY DEPARTMENT ENCOUNTER      NAME: Estephania Wong  YOB: 1975  MRN: [id number]    FINAL IMPRESSION  1. Cellulitis of head except face    2. Scalp lesion        MEDICAL DECISION MAKING   Pertinent Labs & Imaging studies reviewed. (See chart for details)    Estephania Wong is a 48 year old female who presents for evaluation of a lump on the back of her head.  Patient reports that she has been on 2 different antibiotics recently for a left lower dental abscess.  She is still on penicillin reports that overall, the pain has been improving while on the antibiotics.  She does note that she does not plan to go back to the dentist.  She was prescribed penicillin, as she does not trust care there.  Today, she presents with complaints of a painful \"lump\" on the back of her head.  She states that she did some googling and became concerned about a brain abscess so decided to come in for evaluation.  She has had some loss of appetite and nausea but no fever or chills, vomiting, difficulty breathing or swallowing.  No recent falls or trauma, bites, new shampoos, or injury to the area.  On exam, patient does have an erythematous,    Slightly tender area to the parietal scalp.  No visible intra-abdominal abscess, dental fracture, or obvious abnormality.  Floor of the mouth is soft slightly low suspicion for Elliot's angina.  Considered broad differential including but not limited to abscess, folliculitis,  cellulitis, insect bite, contact dermatitis, dental abscess, dental caries, reactive lymphadenopathy, mass/tumor.  I have lower suspicion for meningitis/encephalitis, intracranial mass or hemorrhage.  Discussed options for workup and management the patient.  We have agreed on plan for CT of head and facial bones, basic labs, and management of symptoms with IV fluids and IV analgesic/antiemetic.      ED Course as of 04/24/24 0027 Tue Apr 23, 2024 1917 CRP Inflammation(!): 36.00  Elevated and " concerning for infectious/inflammatory process.    1917 CBC (+ platelets, no diff)(!)  CBC reassuring. No evidence of leukocytosis to suggest systemic infectious/inflammatory process. No acute anemia. PLTs wnl.    1918 Comprehensive metabolic panel(!)  CMP reassuring. No evidence of RAMONITA (creatinine at baseline), acidosis, or significant electrolyte derangement. No acute elevation of bilirubin or transaminates to suggest acute hepatobiliary process.    1939 CT Facial Bones with Contrast  No evidence of ongoing absces amenable to drainage.  Very mild inflammatory changes around the right lower mandible consistent with resolving odontogenic infection.  Will have patient follow-up with dentist for recheck and finish course of penicillin.   1940 CT Head w/o Contrast  Very mild swelling to right occipital region without visible fluid collection amenable to drainage for any intracranial pathology.     Workup today was reassuring, as above.  Rechecked the patient multiple times.  She remained comfortable and had improvement in symptoms after initial interventions.  We discussed results of labs and imaging as well as options for ongoing workup and management.  Ultimately, we agreed on plan to start a course of empiric antibiotics to cover for possible skin infection to scalp.  She will also  finish out the course of penicillin for the dental abscess.  Patient was interested in contact information for alternative dental clinics and I did send her with a list of these as well.  She was given her first dose of Keflex here in the ED and tolerated this well.  After, she was eager to go home.    Strict return precautions and follow up recommendations were discussed and all questions were answered. Patient endorsed understanding and was in agreement with plan.      Medical Decision Making  Obtained supplemental history:Supplemental history obtained?: Documented in chart and Family Member/Significant Other  Reviewed external  records: External records reviewed?: Documented in chart  Care impacted by chronic illness:Hypertension  Care significantly affected by social determinants of health:N/A  Did you consider but not order tests?: Work up considered but not performed and documented in chart, if applicable  Did you interpret images independently?: Independent interpretation of ECG and images noted in documentation, when applicable.  Consultation discussion with other provider:Did you involve another provider (consultant, , pharmacy, etc.)?: No  Discharge. I prescribed additional prescription strength medication(s) as charted. See documentation for any additional details.      ED COURSE  5:57 PM I met with the patient for the initial interview and physical examination. Discussed plan for treatment and workup in the ED.        MEDICATIONS GIVEN IN THE ED  Medications   sodium chloride 0.9% BOLUS 1,000 mL (0 mLs Intravenous Stopped 4/23/24 2001)   HYDROmorphone (DILAUDID) injection 0.5 mg (0.5 mg Intravenous $Given 4/23/24 1821)   ondansetron (ZOFRAN) injection 4 mg (4 mg Intravenous $Given 4/23/24 1817)   iopamidol (ISOVUE-370) solution 90 mL (90 mLs Intravenous $Given 4/23/24 1920)   cephALEXin (KEFLEX) capsule 500 mg (500 mg Oral $Given 4/23/24 2004)       NEW PRESCRIPTIONS STARTED AT TODAY'S VISIT  Discharge Medication List as of 4/23/2024  8:01 PM        START taking these medications    Details   cephALEXin (KEFLEX) 500 MG capsule Take 1 capsule (500 mg) by mouth 4 times daily for 7 days, Disp-28 capsule, R-0, E-Prescribe                =================================================================    Chief Complaint   Patient presents with    Wound Check         HPI:    Patient information was obtained from: patient and patient's spouse    Use of : N/A     Estephania Wong is a 48 year old female with a pertinent history of hypertension and pulmonary embolism who presents via walk-in.The patient woke up this morning  "with a painful lump on the back of her head, nausea, and lack of appetite causing her to not eat PTA. She did not fall or receive any bites to the head, and she thinks that sleepwalking is unlikely because she has sleep apnea and sleeps with a CPAP mask.     Patient wonders if the bump could be secondary to an abscess that developed underneath the space where she had her left lower wisdom tooth removed that is causing pain radiating to her left jaw, left ear, past her left eye, and under her teeth. Patient initially saw her dentist for the abscess, who decided to be \"conservative\" and did not prescribe any medications. The next day, her wound was worse and the patient had a virtual appointment with another dentist, who prescribed amoxicillin and medication mouthwash. Patient felt better for 1 week, then her symptoms returned worse and she became unable to bite on her left bottom molar near the abscess due to the pain. Yesterday, patient saw the first dentist again, who prescribed penicillin, which the patient is still taking. She notes that she does not have a follow-up with that dentist and she does not plan to see them again because they didn't seem to know what to do. Patient has been taking Advil and tylenol PTA for the pain. She notes that she is able to eat, drink, and swallow OK, but she endorses feeling hot quickly and difficulty with word recall. Patient reports a mild allergy to vancomycin, and additional allergies to sulfa and metformin. Patient and her spouse are concerned about a possible brain abscess after they searched her symptoms online.     Patient denies fever.      RELEVANT HISTORY, MEDICATIONS, & ALLERGIES   Past medical history, surgical history, family history, medications, and allergies reviewed and pertinent noted in HPI.    REVIEW OF SYSTEMS:  A complete review of systems was performed with pertinent positives and negatives noted in the HPI. All other systems negative.     PHYSICAL EXAM:  "   Vitals: /63   Pulse 88   Temp 96.8  F (36  C) (Temporal)   Resp 20   LMP  (LMP Unknown)   SpO2 96%    General: Alert and interactive, comfortable appearing.  HENT: Atraumatic. Full AROM of neck. Conjunctiva clear. Small, slightly erythematous and tender lump to parietal scalp without overlying warmth, fluctuance, drainage.  No nuchal rigidity.  No visible dental fracture, abscess, fluctuance, caries.  Floor of the mouth is soft.  No tenderness palpation over mandible.  Cardiovascular: Regular rate and rhythm.   Chest/Pulmonary: Normal work of breathing. Speaking in complete sentences.   Abdomen: Soft, nondistended. N  Extremities: Normal AROM of all major joints.  Skin: Warm and dry. Normal skin color.   Neuro: Speech clear. CNs grossly intact. Moves all extremities spontaneously.   Psych: Normal affect/mood, cooperative, memory appropriate.      LAB  Labs Ordered and Resulted from Time of ED Arrival to Time of ED Departure   CBC WITH PLATELETS - Abnormal       Result Value    WBC Count 10.9      RBC Count 4.62      Hemoglobin 14.3      Hematocrit 43.3      MCV 94      MCH 31.0      MCHC 33.0      RDW 16.0 (*)     Platelet Count 323     COMPREHENSIVE METABOLIC PANEL - Abnormal    Sodium 138      Potassium 3.7      Carbon Dioxide (CO2) 26      Anion Gap 14      Urea Nitrogen 11.9      Creatinine 1.07 (*)     GFR Estimate 64      Calcium 9.2      Chloride 98      Glucose 165 (*)     Alkaline Phosphatase 60      AST 26      ALT 33      Protein Total 7.5      Albumin 4.2      Bilirubin Total 0.4     BASIC METABOLIC PANEL - Abnormal    Sodium 140      Potassium 3.6      Chloride 100      Carbon Dioxide (CO2) 26      Anion Gap 14      Urea Nitrogen 12.0      Creatinine 1.07 (*)     GFR Estimate 64      Calcium 9.3      Glucose 166 (*)    CRP INFLAMMATION - Abnormal    CRP Inflammation 36.00 (*)        RADIOLOGY  CT Facial Bones with Contrast   Final Result   IMPRESSION:    1.  Suggestion of mild inflammatory  soft tissue swelling and subtle irregular lucency along the lingual surface of the posterior left mandibular alveolus, potentially sequela of previous odontogenic infection in this region. No definite fluid collection    identified currently to confirm ongoing abscess.      CT Head w/o Contrast   Final Result   IMPRESSION:   1.  No CT evidence for acute intracranial process.   2.  Minor right occipital scalp swelling is nonspecific and may be posttraumatic or related to an underlying inflammatory process. No associated osseous abnormality.              I, Ros Johns, am serving as a scribe to document services personally performed by Dr. Destini Zamudio based on my observation and the provider's statements to me. I, Destini Zamudio MD attest that Ros Johns is acting in a scribe capacity, has observed my performance of the services and has documented them in accordance with my direction.    Destini Zamudio M.D.  Emergency Medicine  [facility] EMERGENCY DEPARTMENT  [address] [place] [id number]  [phone]  Dept: [phone]      Destini Zamudio MD  04/24/24 0027

## 2024-04-24 NOTE — DISCHARGE INSTRUCTIONS
You were seen in the Emergency Department today for a lump on your scalp. As we discussed, it looks like you might have an early skin infection called cellulitis.     The CT scan showed that the dental abscess is improving he should continue the antibiotic and follow-up with the dentist for recheck.  I included a list of clinics below but if you find an alternative, that will be fine too.    You were given your first dose of antibiotics here in the Emergency Department and are being sent with a prescription for this medication.You should continue to take the entire course as prescribed, even if you feel like your symptoms are getting better.       Please return to the ER if you experience fever, increasing redness, swelling, or pain, inability to keep food/fluids down, and/or for any other new or concerning symptoms, otherwise please follow up with your primary doctor in 5-7 days for recheck.     Below is some information you might find useful.     Thank you for choosing [facility]. It was a pleasure taking care of you today!  - Dr. Destini Zamudio       Many of these clinics offer a sliding fee option for patients that qualify, and see patients on a walk-in or same day basis. Please call each clinic directly. As services, hours, fees and policies vary greatly.    [place]:  [facility]     [phone]  [facility] ([facility]) [phone]  [facility]  [phone]  [facility]      [phone]   [facility]    [phone]  [facility]  [phone]  [facility] [phone]  [facility]     [phone]  [facility]   [phone]  [facility] (cash only)   [phone]  [facility]    [phone] (adults)          [phone] (children)    [place]:  Count includes the [facility]  Care     [phone]; [phone]  [facility]     [phone]  [facility]     [phone]  [facility] (free, limited)    [phone]    Multiple Locations:  [facility]       [phone]

## 2024-05-07 ENCOUNTER — TRANSFERRED RECORDS (OUTPATIENT)
Dept: HEALTH INFORMATION MANAGEMENT | Facility: CLINIC | Age: 49
End: 2024-05-07
Payer: COMMERCIAL

## 2024-05-19 DIAGNOSIS — G47.00 INSOMNIA, UNSPECIFIED TYPE: ICD-10-CM

## 2024-05-19 DIAGNOSIS — G25.81 RESTLESS LEGS SYNDROME (RLS): ICD-10-CM

## 2024-05-19 DIAGNOSIS — Z79.899 CONTROLLED SUBSTANCE AGREEMENT SIGNED: ICD-10-CM

## 2024-05-20 RX ORDER — CLONAZEPAM 1 MG/1
1 TABLET ORAL EVERY MORNING
Qty: 30 TABLET | Refills: 0 | Status: SHIPPED | OUTPATIENT
Start: 2024-05-26 | End: 2024-06-24

## 2024-05-20 RX ORDER — ZOLPIDEM TARTRATE 5 MG/1
TABLET ORAL
Qty: 30 TABLET | Refills: 0 | Status: SHIPPED | OUTPATIENT
Start: 2024-05-26 | End: 2024-06-24

## 2024-05-20 NOTE — TELEPHONE ENCOUNTER
1. Insomnia, unspecified type  - clonazePAM (KLONOPIN) 1 MG tablet; Take 1 tablet (1 mg) by mouth every morning  Dispense: 30 tablet; Refill: 0  - zolpidem (AMBIEN) 5 MG tablet; TAKE ONE TABLET BY MOUTH EVERY NIGHT AT BEDTIME AS NEEDED FOR SLEEP  Dispense: 30 tablet; Refill: 0    2. Controlled substance agreement signed - 8/9/23  - clonazePAM (KLONOPIN) 1 MG tablet; Take 1 tablet (1 mg) by mouth every morning  Dispense: 30 tablet; Refill: 0  - zolpidem (AMBIEN) 5 MG tablet; TAKE ONE TABLET BY MOUTH EVERY NIGHT AT BEDTIME AS NEEDED FOR SLEEP  Dispense: 30 tablet; Refill: 0    3. Restless legs syndrome (RLS)  - clonazePAM (KLONOPIN) 1 MG tablet; Take 1 tablet (1 mg) by mouth every morning  Dispense: 30 tablet; Refill: 0     Reviewed [place] . Coming due for refill. CSA up to date. Next med check scheduled. Refills sent to pharmacy.    Damaris Son DO

## 2024-06-14 ENCOUNTER — TRANSFERRED RECORDS (OUTPATIENT)
Dept: HEALTH INFORMATION MANAGEMENT | Facility: CLINIC | Age: 49
End: 2024-06-14
Payer: COMMERCIAL

## 2024-06-17 ENCOUNTER — E-VISIT (OUTPATIENT)
Dept: FAMILY MEDICINE | Facility: CLINIC | Age: 49
End: 2024-06-17
Payer: COMMERCIAL

## 2024-06-17 DIAGNOSIS — E66.01 MORBID OBESITY (H): Primary | ICD-10-CM

## 2024-06-17 PROCEDURE — 99207 PR NON-BILLABLE SERV PER CHARTING: CPT | Performed by: FAMILY MEDICINE

## 2024-06-17 NOTE — LETTER
June 17, 2024      Estehpania Wong  [address]  [facility] [address]        To Whom It May Concern,     Naheed does not have any contraindications to treatment with a GLP-1 agonist.       Sincerely,        Damaris Son, DO

## 2024-06-22 DIAGNOSIS — G47.00 INSOMNIA, UNSPECIFIED TYPE: ICD-10-CM

## 2024-06-22 DIAGNOSIS — Z79.899 CONTROLLED SUBSTANCE AGREEMENT SIGNED: ICD-10-CM

## 2024-06-22 DIAGNOSIS — G25.81 RESTLESS LEGS SYNDROME (RLS): ICD-10-CM

## 2024-06-24 ENCOUNTER — E-VISIT (OUTPATIENT)
Dept: FAMILY MEDICINE | Facility: CLINIC | Age: 49
End: 2024-06-24
Payer: COMMERCIAL

## 2024-06-24 DIAGNOSIS — M79.7 FIBROMYALGIA: Primary | ICD-10-CM

## 2024-06-24 PROCEDURE — 99207 PR NON-BILLABLE SERV PER CHARTING: CPT | Performed by: FAMILY MEDICINE

## 2024-06-24 RX ORDER — CLONAZEPAM 1 MG/1
1 TABLET ORAL EVERY MORNING
Qty: 30 TABLET | Refills: 0 | Status: SHIPPED | OUTPATIENT
Start: 2024-06-27 | End: 2024-07-22

## 2024-06-24 RX ORDER — ZOLPIDEM TARTRATE 5 MG/1
TABLET ORAL
Qty: 30 TABLET | Refills: 0 | Status: SHIPPED | OUTPATIENT
Start: 2024-06-27 | End: 2024-07-22

## 2024-06-24 NOTE — PATIENT INSTRUCTIONS
Thank you for choosing us for your care. I think an in-clinic or virtual visit would be the best next step based on your symptoms. Please schedule a clinic appointment; you won t be charged for this eVisit.      You can schedule an appointment by clicking here in [place], or call [phone].

## 2024-06-24 NOTE — TELEPHONE ENCOUNTER
Provider E-Visit time total (minutes): n/a - needs in person visit.     [facility] STAFF - addition of another controlled substance would require an in person visit/addendum to CSA agreement. Please offer to schedule an appointment as soon as possible to address concern, may use reserved spot if needed.    Damaris Son, DO

## 2024-06-24 NOTE — TELEPHONE ENCOUNTER
1. Insomnia, unspecified type  - clonazePAM (KLONOPIN) 1 MG tablet; Take 1 tablet (1 mg) by mouth every morning  Dispense: 30 tablet; Refill: 0  - zolpidem (AMBIEN) 5 MG tablet; TAKE ONE TABLET BY MOUTH EVERY NIGHT AT BEDTIME AS NEEDED FOR SLEEP  Dispense: 30 tablet; Refill: 0    2. Controlled substance agreement signed - 8/9/23  - clonazePAM (KLONOPIN) 1 MG tablet; Take 1 tablet (1 mg) by mouth every morning  Dispense: 30 tablet; Refill: 0  - zolpidem (AMBIEN) 5 MG tablet; TAKE ONE TABLET BY MOUTH EVERY NIGHT AT BEDTIME AS NEEDED FOR SLEEP  Dispense: 30 tablet; Refill: 0    3. Restless legs syndrome (RLS)  - clonazePAM (KLONOPIN) 1 MG tablet; Take 1 tablet (1 mg) by mouth every morning  Dispense: 30 tablet; Refill: 0     [place]ed [place] . Due for refill. CSA up to date. Next med check scheduled. Prescriptions sent to pharmacy.    Damaris Son DO

## 2024-06-27 ENCOUNTER — TRANSFERRED RECORDS (OUTPATIENT)
Dept: HEALTH INFORMATION MANAGEMENT | Facility: CLINIC | Age: 49
End: 2024-06-27
Payer: COMMERCIAL

## 2024-07-21 DIAGNOSIS — G25.81 RESTLESS LEGS SYNDROME (RLS): ICD-10-CM

## 2024-07-21 DIAGNOSIS — G47.00 INSOMNIA, UNSPECIFIED TYPE: ICD-10-CM

## 2024-07-21 DIAGNOSIS — Z79.899 CONTROLLED SUBSTANCE AGREEMENT SIGNED: ICD-10-CM

## 2024-07-22 RX ORDER — CLONAZEPAM 1 MG/1
1 TABLET ORAL EVERY MORNING
Qty: 30 TABLET | Refills: 0 | Status: SHIPPED | OUTPATIENT
Start: 2024-07-26 | End: 2024-08-26

## 2024-07-22 RX ORDER — ZOLPIDEM TARTRATE 5 MG/1
TABLET ORAL
Qty: 30 TABLET | Refills: 0 | Status: SHIPPED | OUTPATIENT
Start: 2024-07-26 | End: 2024-08-26

## 2024-07-22 NOTE — TELEPHONE ENCOUNTER
1. Insomnia, unspecified type  2. Controlled substance agreement signed - 8/9/23  3. Restless legs syndrome (RLS)  - clonazePAM (KLONOPIN) 1 MG tablet; Take 1 tablet (1 mg) by mouth every morning  Dispense: 30 tablet; Refill: 0  - zolpidem (AMBIEN) 5 MG tablet; TAKE ONE TABLET BY MOUTH EVERY NIGHT AT BEDTIME AS NEEDED FOR SLEEP  Dispense: 30 tablet; Refill: 0    Reviewed [place] . Coming due for refills. CSA up to date. Next med check scheduled. Refills sent to pharmacy.    Damaris Son DO

## 2024-07-25 ENCOUNTER — APPOINTMENT (OUTPATIENT)
Dept: CT IMAGING | Facility: HOSPITAL | Age: 49
End: 2024-07-25
Attending: FAMILY MEDICINE
Payer: COMMERCIAL

## 2024-07-25 ENCOUNTER — APPOINTMENT (OUTPATIENT)
Dept: RADIOLOGY | Facility: HOSPITAL | Age: 49
End: 2024-07-25
Attending: STUDENT IN AN ORGANIZED HEALTH CARE EDUCATION/TRAINING PROGRAM
Payer: COMMERCIAL

## 2024-07-25 ENCOUNTER — NURSE TRIAGE (OUTPATIENT)
Dept: FAMILY MEDICINE | Facility: CLINIC | Age: 49
End: 2024-07-25
Payer: COMMERCIAL

## 2024-07-25 ENCOUNTER — HOSPITAL ENCOUNTER (EMERGENCY)
Facility: HOSPITAL | Age: 49
Discharge: HOME | End: 2024-07-25
Attending: FAMILY MEDICINE | Admitting: FAMILY MEDICINE
Payer: COMMERCIAL

## 2024-07-25 VITALS
WEIGHT: 293 LBS | OXYGEN SATURATION: 93 % | SYSTOLIC BLOOD PRESSURE: 125 MMHG | HEIGHT: 66 IN | BODY MASS INDEX: 47.09 KG/M2 | HEART RATE: 90 BPM | TEMPERATURE: 97.6 F | RESPIRATION RATE: 24 BRPM | DIASTOLIC BLOOD PRESSURE: 75 MMHG

## 2024-07-25 DIAGNOSIS — U07.1 COVID-19 VIRUS INFECTION: ICD-10-CM

## 2024-07-25 DIAGNOSIS — J15.9 COMMUNITY ACQUIRED BACTERIAL PNEUMONIA: ICD-10-CM

## 2024-07-25 LAB
ANION GAP SERPL CALCULATED.3IONS-SCNC: 16 MMOL/L (ref 7–15)
ATRIAL RATE - MUSE: 85 BPM
BASOPHILS # BLD AUTO: 0 10E3/UL (ref 0–0.2)
BASOPHILS NFR BLD AUTO: 0 %
BUN SERPL-MCNC: 20.6 MG/DL (ref 6–20)
CALCIUM SERPL-MCNC: 9.2 MG/DL (ref 8.8–10.4)
CHLORIDE SERPL-SCNC: 93 MMOL/L (ref 98–107)
CREAT SERPL-MCNC: 1.1 MG/DL (ref 0.51–0.95)
D DIMER PPP FEU-MCNC: 0.84 UG/ML FEU (ref 0–0.5)
DIASTOLIC BLOOD PRESSURE - MUSE: NORMAL MMHG
EGFRCR SERPLBLD CKD-EPI 2021: 62 ML/MIN/1.73M2
EOSINOPHIL # BLD AUTO: 0 10E3/UL (ref 0–0.7)
EOSINOPHIL NFR BLD AUTO: 0 %
ERYTHROCYTE [DISTWIDTH] IN BLOOD BY AUTOMATED COUNT: 15.3 % (ref 10–15)
GLUCOSE SERPL-MCNC: 314 MG/DL (ref 70–99)
HCO3 SERPL-SCNC: 25 MMOL/L (ref 22–29)
HCT VFR BLD AUTO: 42.6 % (ref 35–47)
HGB BLD-MCNC: 14.6 G/DL (ref 11.7–15.7)
HOLD SPECIMEN: NORMAL
IMM GRANULOCYTES # BLD: 0 10E3/UL
IMM GRANULOCYTES NFR BLD: 1 %
INTERPRETATION ECG - MUSE: NORMAL
LYMPHOCYTES # BLD AUTO: 1.2 10E3/UL (ref 0.8–5.3)
LYMPHOCYTES NFR BLD AUTO: 18 %
MCH RBC QN AUTO: 31 PG (ref 26.5–33)
MCHC RBC AUTO-ENTMCNC: 34.3 G/DL (ref 31.5–36.5)
MCV RBC AUTO: 90 FL (ref 78–100)
MONOCYTES # BLD AUTO: 0.3 10E3/UL (ref 0–1.3)
MONOCYTES NFR BLD AUTO: 5 %
NEUTROPHILS # BLD AUTO: 5.1 10E3/UL (ref 1.6–8.3)
NEUTROPHILS NFR BLD AUTO: 76 %
NRBC # BLD AUTO: 0 10E3/UL
NRBC BLD AUTO-RTO: 0 /100
P AXIS - MUSE: 45 DEGREES
PLATELET # BLD AUTO: 194 10E3/UL (ref 150–450)
POTASSIUM SERPL-SCNC: 3.1 MMOL/L (ref 3.4–5.3)
PR INTERVAL - MUSE: 166 MS
QRS DURATION - MUSE: 88 MS
QT - MUSE: 388 MS
QTC - MUSE: 461 MS
R AXIS - MUSE: 2 DEGREES
RBC # BLD AUTO: 4.71 10E6/UL (ref 3.8–5.2)
SODIUM SERPL-SCNC: 134 MMOL/L (ref 135–145)
SYSTOLIC BLOOD PRESSURE - MUSE: NORMAL MMHG
T AXIS - MUSE: 34 DEGREES
TROPONIN T SERPL HS-MCNC: 7 NG/L
VENTRICULAR RATE- MUSE: 85 BPM
WBC # BLD AUTO: 6.8 10E3/UL (ref 4–11)

## 2024-07-25 PROCEDURE — 80048 BASIC METABOLIC PNL TOTAL CA: CPT | Performed by: FAMILY MEDICINE

## 2024-07-25 PROCEDURE — 99285 EMERGENCY DEPT VISIT HI MDM: CPT | Mod: 25

## 2024-07-25 PROCEDURE — 71275 CT ANGIOGRAPHY CHEST: CPT

## 2024-07-25 PROCEDURE — 36415 COLL VENOUS BLD VENIPUNCTURE: CPT | Performed by: FAMILY MEDICINE

## 2024-07-25 PROCEDURE — 85041 AUTOMATED RBC COUNT: CPT | Performed by: FAMILY MEDICINE

## 2024-07-25 PROCEDURE — 93005 ELECTROCARDIOGRAM TRACING: CPT | Performed by: STUDENT IN AN ORGANIZED HEALTH CARE EDUCATION/TRAINING PROGRAM

## 2024-07-25 PROCEDURE — 93005 ELECTROCARDIOGRAM TRACING: CPT | Performed by: FAMILY MEDICINE

## 2024-07-25 PROCEDURE — 85379 FIBRIN DEGRADATION QUANT: CPT | Performed by: FAMILY MEDICINE

## 2024-07-25 PROCEDURE — 250N000011 HC RX IP 250 OP 636: Performed by: FAMILY MEDICINE

## 2024-07-25 PROCEDURE — 84484 ASSAY OF TROPONIN QUANT: CPT | Performed by: FAMILY MEDICINE

## 2024-07-25 PROCEDURE — 71046 X-RAY EXAM CHEST 2 VIEWS: CPT

## 2024-07-25 RX ORDER — ALBUTEROL SULFATE 90 UG/1
2 AEROSOL, METERED RESPIRATORY (INHALATION) EVERY 4 HOURS PRN
Qty: 18 G | Refills: 0 | Status: SHIPPED | OUTPATIENT
Start: 2024-07-25

## 2024-07-25 RX ORDER — DEXAMETHASONE 6 MG/1
6 TABLET ORAL DAILY
Qty: 5 TABLET | Refills: 0 | Status: SHIPPED | OUTPATIENT
Start: 2024-07-25 | End: 2024-08-15

## 2024-07-25 RX ORDER — IOPAMIDOL 755 MG/ML
75 INJECTION, SOLUTION INTRAVASCULAR ONCE
Status: COMPLETED | OUTPATIENT
Start: 2024-07-25 | End: 2024-07-25

## 2024-07-25 RX ORDER — DOXYCYCLINE 100 MG/1
100 CAPSULE ORAL 2 TIMES DAILY
Qty: 14 CAPSULE | Refills: 0 | Status: SHIPPED | OUTPATIENT
Start: 2024-07-25 | End: 2024-08-15

## 2024-07-25 RX ADMIN — IOPAMIDOL 75 ML: 755 INJECTION, SOLUTION INTRAVENOUS at 17:04

## 2024-07-25 ASSESSMENT — COLUMBIA-SUICIDE SEVERITY RATING SCALE - C-SSRS
1. IN THE PAST MONTH, HAVE YOU WISHED YOU WERE DEAD OR WISHED YOU COULD GO TO SLEEP AND NOT WAKE UP?: NO
6. HAVE YOU EVER DONE ANYTHING, STARTED TO DO ANYTHING, OR PREPARED TO DO ANYTHING TO END YOUR LIFE?: NO
2. HAVE YOU ACTUALLY HAD ANY THOUGHTS OF KILLING YOURSELF IN THE PAST MONTH?: NO

## 2024-07-25 ASSESSMENT — ACTIVITIES OF DAILY LIVING (ADL)
ADLS_ACUITY_SCORE: 38
ADLS_ACUITY_SCORE: 38

## 2024-07-25 NOTE — TELEPHONE ENCOUNTER
Nurse Triage SBAR    Is this a 2nd Level Triage? NO    Situation: Patient is calling for Covid concerns, would like to know if they should go to the ED.    Background: Patient tested positive one week ago along with .  Patient has noticed it has become more difficult to breathe recently.  Patient notices they get out of breath walking across the room and when talking for extended periods at rest.    Assessment:   SOB at rest while speaking  SOB ambulating  Chest pressure    Protocol Recommended Disposition:   Go to ED Now    Recommendation: Go to ED now.  Given symptoms  of SOB while at rest and talking along with chest pressure, advised patient to go to the ED now.  Patient verbalized understanding and will go to the ED now.    Siva GALLARDO RN      Reason for Disposition   SEVERE or constant chest pain or pressure  (Exception: Mild central chest pain, present only when coughing.)    Additional Information   Negative: SEVERE difficulty breathing (e.g., struggling for each breath, speaks in single words)   Negative: Difficult to awaken or acting confused (e.g., disoriented, slurred speech)   Negative: Bluish (or gray) lips or face now   Negative: Shock suspected (e.g., cold/pale/clammy skin, too weak to stand, low BP, rapid pulse)   Negative: Sounds like a life-threatening emergency to the triager   Negative: Diagnosed or suspected COVID-19 and symptoms lasting 3 or more weeks   Negative: COVID-19 exposure and no symptoms   Negative: COVID-19 vaccine reaction suspected (e.g., fever, headache, muscle aches) occurring 1 to 3 days after getting vaccine   Negative: COVID-19 vaccine, questions about   Negative: Lives with someone known to have influenza (flu test positive) and flu-like symptoms (e.g., cough, runny nose, sore throat, SOB; with or without fever)   Negative: Possible COVID-19 symptoms and triager concerned about severity of symptoms or other causes   Negative: COVID-19 and breastfeeding, questions  about    Protocols used: Coronavirus (COVID-19) Diagnosed or [id number]

## 2024-07-25 NOTE — ED TRIAGE NOTES
Pt had a positive home covid test a week ago. Yesterday, she developed shortness of breath, chest pressure, and light-headedness that has become worse today. Pt states shortness of breath is worse on exertion. Able to speak in full sentences in triage. 96% on room air.      Triage Assessment (Adult)       Row Name 07/25/24 1401          Triage Assessment    Airway WDL WDL        Respiratory WDL    Respiratory WDL rhythm/pattern     Rhythm/Pattern, Respiratory dyspnea upon exertion;shortness of breath;pattern regular;depth regular        Skin Circulation/Temperature WDL    Skin Circulation/Temperature WDL WDL        Cardiac WDL    Cardiac WDL chest pain        Chest Pain Assessment    Character pressure        Peripheral/Neurovascular WDL    Peripheral Neurovascular WDL WDL        Cognitive/Neuro/Behavioral WDL    Cognitive/Neuro/Behavioral WDL WDL

## 2024-07-25 NOTE — ED PROVIDER NOTES
EMERGENCY DEPARTMENT ENCOUNTER      NAME: Estephania Wong  AGE: 48 year old female  YOB: 1975  MRN: [id number]  EVALUATION DATE & TIME: 7/25/2024  3:21 PM    PCP: Damaris Son    ED PROVIDER: Jacques Grande M.D.    Chief Complaint   Patient presents with    Shortness of Breath    Covid Concern       FINAL IMPRESSION:  1. COVID-19 virus infection    2. Community acquired bacterial pneumonia        ED COURSE & MEDICAL DECISION MAKING:    Pertinent Labs & Imaging studies independently interpreted by me. (See chart for details)  Reviewed most recent primary care e-visit on June 24 which was for headache with history of headaches, requested prescription for tramadol.    ED Course as of 07/25/24 1739   Thu Jul 25, 2024   1625 Patient seen and examined, presents with worsening cough 1 week after positive COVID test.  Some lightheadedness on exertion, feels rattling in her chest but no chest pain.  Denies lower extremity swelling.  On exam here, awake and alert, no tachycardia, no hypoxia, crackles particularly in the right lung.  Labs independently interpreted by me   1627 Labs ordered and independently interpreted by me with normal CBC but elevated D-dimer.  Given patient history of PE, CT PE study is ordered.   1709 CT PE study independently interpreted by me does not demonstrate acute pulmonary embolism, does demonstrate diffuse infiltrates, these could be related to COVID and do have appearance of COVID-pneumonia but given change in symptoms and worsening cough patient will be started on a Augmentin and doxycycline.         At the conclusion of the encounter I discussed the results of all of the tests and the disposition. The questions were answered. The patient or family acknowledged understanding and was agreeable with the care plan.     Medical Decision Making  Obtained supplemental history:Supplemental history obtained?: No  Reviewed external records: External records reviewed?:  Documented in chart  Care impacted by chronic illness:Chronic Pain and Hypertension  Care significantly affected by social determinants of health:Access to Affordable Health Care  Did you consider but not order tests?: Work up considered but not performed and documented in chart, if applicable  Did you interpret images independently?: Independent interpretation of ECG and images noted in documentation, when applicable.  Consultation discussion with other provider:Did you involve another provider (consultant, , pharmacy, etc.)?: No  Discharge. I prescribed additional prescription strength medication(s) as charted. I considered admission, but discharged patient after significant clinical improvement.    MEDICATIONS GIVEN IN THE EMERGENCY:  Medications   iopamidol (ISOVUE-370) solution 75 mL (75 mLs Intravenous $Given 7/25/24 6547)       NEW PRESCRIPTIONS STARTED AT TODAY'S ER VISIT  New Prescriptions    AMOXICILLIN-CLAVULANATE (AUGMENTIN) 875-125 MG TABLET    Take 1 tablet by mouth 2 times daily    DEXAMETHASONE (DECADRON) 6 MG TABLET    Take 1 tablet (6 mg) by mouth daily for 5 days    DOXYCYCLINE HYCLATE (VIBRAMYCIN) 100 MG CAPSULE    Take 1 capsule (100 mg) by mouth 2 times daily       =================================================================    HPI    Patient information was obtained from: Patient      Estephania Wong is a 48 year old female with a pertinent history of pulmonary embolism related to lower extremity fracture with immobility and being on birth control, also history of pneumonia, who presents to this ED for evaluation of cough and lightheadedness.  Patient had a positive COVID test last week with cough, runny nose, body aches, nausea.  Runny nose, nausea, and bodyaches have improved but continues to have cough although she says now it is more deep, also shortness of breath and lightheadedness when she stands up.  Denies vomiting or diarrhea.  Denies abdominal pain or lower extremity  swelling.      REVIEW OF SYSTEMS   Review of Systems   All other systems reviewed and negative    PAST MEDICAL HISTORY:  Past Medical History:   Diagnosis Date    Anemia 09/24/2019    Arthritis 2019    Normal arthritis symptoms for my age    Back pain 10/2013    Fracture of ankle, left, closed 2003    with plates & screws    History of blood transfusion May 2009    Was bleeding out too much with my menstral cycle    Hypertension     Hypocalcemia 02/14/2016    Formatting of this note might be different from the original. Formatting of this note might be different from the original. Noted on February 13, 2016. Formatting of this note might be different from the original. Noted on February 13, 2016.    Iron deficiency anemia 05/20/2015    IUD contraception 2009    LBP (low back pain) 05/20/2015    Obese     Pneumonia 2009    Pulmonary embolism, bilateral (H) 2003    Sleep apnea        PAST SURGICAL HISTORY:  Past Surgical History:   Procedure Laterality Date    ARTHROSCOPY SHOULDER BICEPS TENODESIS REPAIR Right 10/06/2022    Procedure: RIGHT SHOULDER ARTHROSCOPY, ACROMIOPLASTY, BICEPS TENOTOMY, CHONDROPLASTY WITH MICROFRACTURE GLENOID;  Surgeon: Jai Magdaleno MD, DO;  Location: [facility]    BACK SURGERY  May 2019    L5/S1 laminectomy    BIOPSY  May 2009    Had double pneumonia from H1N1 and I wasnt responding to treatment. They took a lung sample.    CHOLECYSTECTOMY  July 1999    EYE SURGERY  November 2022    Both upper eyelids were fixed as i had no peripheral vision    GALLBLADDER SURGERY  01/01/1999    HC GASTROCNEMIUS RECESSION Left 10/03/2019    Procedure: LEFT ANKLE ACHILLES TENDON DEBRIDEMENT INSERTIONAL ACHILLES TENDINOPATHY DEBRIDEMENT YULIA EXCISION GASTROC RECESSION;  Surgeon: Jace Rocha DO;  Location: [facility];  Service: Orthopedics    HC GASTROCNEMIUS RECESSION Right 12/05/2019    Procedure: GASTROCNEMIUS RECESSIN;  Surgeon: Jace Rocha DO;  Location: [facility]  Main OR;  Service: Orthopedics    LAMINECTOMY      REMOVE HARDWARE LOWER EXTREMITY Left 10/03/2019    Procedure: LEFT ANKLE HARDWARE REMOVAL;  Surgeon: Jace Rocha DO;  Location: [facility] Main OR;  Service: Orthopedics    REPAIR TENDON ACHILLES Right 12/05/2019    Procedure: RIGHT HAGLUNDS RESECTION,ACHILLES REPAIR,;  Surgeon: Jace Rocha DO;  Location: [facility] Main OR;  Service: Orthopedics       CURRENT MEDICATIONS:    No current facility-administered medications for this encounter.     Current Outpatient Medications   Medication Sig Dispense Refill    amoxicillin-clavulanate (AUGMENTIN) 875-125 MG tablet Take 1 tablet by mouth 2 times daily 14 tablet 0    dexAMETHasone (DECADRON) 6 MG tablet Take 1 tablet (6 mg) by mouth daily for 5 days 5 tablet 0    doxycycline hyclate (VIBRAMYCIN) 100 MG capsule Take 1 capsule (100 mg) by mouth 2 times daily 14 capsule 0    acetaminophen (TYLENOL) 325 MG tablet Take 2 tablets (650 mg) by mouth every 4 hours as needed for other (For optimal non-opioid multimodal pain management to improve pain control.) (Patient not taking: Reported on 8/9/2023)      baclofen (LIORESAL) 10 MG tablet  (Patient not taking: Reported on 3/5/2024)      buPROPion (WELLBUTRIN XL) 150 MG 24 hr tablet Take 1 tablet (150 mg) by mouth every morning 90 tablet 3    celecoxib (CELEBREX) 100 MG capsule Take 1 capsule by mouth twice daily. Overdue 6 month monitoring labs. Yolande refill sent to bridge to scheduled med check. 180 capsule 1    [START ON 7/26/2024] clonazePAM (KLONOPIN) 1 MG tablet Take 1 tablet (1 mg) by mouth every morning 30 tablet 0    diclofenac (VOLTAREN) 75 MG EC tablet       diphenhydrAMINE-acetaminophen (TYLENOL PM)  MG tablet Take 2 tablets by mouth nightly as needed for sleep      fluticasone (FLONASE) 50 MCG/ACT nasal spray Spray 2 sprays in nostril daily as needed (Patient not taking: Reported on 4/11/2023)      hydrochlorothiazide (HYDRODIURIL) 50 MG tablet  Take 1 tablet (50 mg) by mouth daily 90 tablet 3    ketoconazole (NIZORAL) 2 % external cream Apply topically daily Apply to skin folds as needed (Patient not taking: Reported on 3/5/2024) 60 g 1    levonorgestrel (MIRENA) 20 MCG/DAY IUD 1 each by Intrauterine route once      magnesium oxide (MAG-OX) 400 MG tablet Take 400 mg by mouth 2 times daily      medical cannabis (Patient's own supply)       methocarbamol (ROBAXIN) 500 MG tablet TAKE ONE TABLET BY MOUTH ONCE DAILY AS NEEDED FOR MUSCLE SPASMS (Patient taking differently: 750 mg TAKE ONE TABLET BY MOUTH ONCE DAILY AS NEEDED FOR MUSCLE SPASMS) 90 tablet 1    minoxidil (ROGAINE WOMENS) 2 % external solution Apply topically twice daily. 60 mL 3    multivitamin w/minerals (THERA-VIT-M) tablet Take 1 tablet by mouth daily      naltrexone (DEPADE/REVIA) 50 MG tablet  (Patient not taking: Reported on 3/5/2024)      nortriptyline (PAMELOR) 10 MG capsule Take 20 mg by mouth At Bedtime      omeprazole (PRILOSEC) 20 MG DR capsule Take 20 mg by mouth daily      potassium chloride ER (KLOR-CON M) 20 MEQ CR tablet Take 1 tablet (20 mEq) by mouth daily (Patient not taking: Reported on 3/5/2024) 90 tablet 2    Pregabalin (LYRICA) 200 MG capsule Take 200 mg by mouth at bedtime 200 mg am and 200 mg pm      propranolol ER (INDERAL LA) 80 MG 24 hr capsule TAKE ONE CAPSULE BY MOUTH ONCE DAILY 90 capsule 3    rizatriptan (MAXALT-MLT) 10 MG ODT Take 10 mg by mouth at onset of headache (Patient not taking: Reported on 3/5/2024)      rOPINIRole (REQUIP) 0.5 MG tablet Take 1 tablet  (0.5mg) 2.5 hours before bedtime. Take second tablet (0.5mg) at bedtime. 180 tablet 3    spironolactone (ALDACTONE) 100 MG tablet Take 1 tablet (100 mg) by mouth daily 90 tablet 3    tiZANidine (ZANAFLEX) 2 MG tablet Take 1/2 to 1 tablet by mouth every night at bedtime as needed for muscle spasms. 30 tablet 0    UBRELVY 50 MG tablet UBRELVY 50 MG TABS      venlafaxine (EFFEXOR) 37.5 MG tablet       Vitamin  "D3 (CHOLECALCIFEROL) 125 MCG (5000 UT) tablet Take 1 tablet by mouth daily      [START ON 7/26/2024] zolpidem (AMBIEN) 5 MG tablet TAKE ONE TABLET BY MOUTH EVERY NIGHT AT BEDTIME AS NEEDED FOR SLEEP 30 tablet 0       ALLERGIES:  Allergies   Allergen Reactions    Vancomycin Itching    Metformin Nausea    Sulfa Antibiotics Rash       FAMILY HISTORY:  Family History   Problem Relation Age of Onset    Cancer Mother     Hypertension Mother     Other Cancer Mother         Lung cancer (not from smoking)    Coronary Artery Disease Father     Venous thrombosis Sister        SOCIAL HISTORY:   Social History     Socioeconomic History    Marital status:    Tobacco Use    Smoking status: Never     Passive exposure: Never    Smokeless tobacco: Never   Vaping Use    Vaping status: Some Days   Substance and Sexual Activity    Alcohol use: Never    Drug use: Yes     Types: Marijuana     Comment: Was prescribed medical cannabis in 2020 for fibro pain    Sexual activity: Yes     Partners: Male     Birth control/protection: I.U.D.     Comment:    Other Topics Concern    Parent/sibling w/ CABG, MI or angioplasty before 65F 55M? No     Social Determinants of Health      [facility]    Financial Resource Strain    Received from [employer], [employer]    Social Connections       VITALS:  /80   Pulse 88   Temp 97.6  F (36.4  C) (Oral)   Resp 24   Ht 1.676 m (5' 6\")   Wt 149.7 kg (330 lb)   SpO2 93%   BMI 53.26 kg/m      PHYSICAL EXAM:  Physical Exam  Vitals and nursing note reviewed.   Constitutional:       Appearance: Normal appearance.   HENT:      Head: Normocephalic and atraumatic.      Right Ear: External ear normal.      Left Ear: External ear normal.      Nose: Nose normal.      Mouth/Throat:      Mouth: Mucous membranes are moist.   Eyes:      " Extraocular Movements: Extraocular movements intact.      Conjunctiva/sclera: Conjunctivae normal.      Pupils: Pupils are equal, round, and reactive to light.   Cardiovascular:      Rate and Rhythm: Normal rate and regular rhythm.   Pulmonary:      Effort: Pulmonary effort is normal.      Breath sounds: Rales (right base) present. No wheezing.   Abdominal:      General: Abdomen is flat. There is no distension.      Palpations: Abdomen is soft.      Tenderness: There is no abdominal tenderness. There is no guarding.   Musculoskeletal:         General: Normal range of motion.      Cervical back: Normal range of motion and neck supple.      Right lower leg: No edema.      Left lower leg: No edema.   Lymphadenopathy:      Cervical: No cervical adenopathy.   Skin:     General: Skin is warm and dry.   Neurological:      General: No focal deficit present.      Mental Status: She is alert and oriented to person, place, and time. Mental status is at baseline.      Comments: No gross focal neurologic deficits   Psychiatric:         Mood and Affect: Mood normal.         Behavior: Behavior normal.         Thought Content: Thought content normal.          LAB:  All pertinent labs reviewed and interpreted.  Results for orders placed or performed during the hospital encounter of 07/25/24   XR Chest 2 Views    Impression    IMPRESSION: Subtle bronchovascular opacities in the right mid and left mid/lower lungs, which can be seen in the setting of atypical infection (such as COVID). No pleural effusion or pneumothorax. Cardiac silhouette and mediastinal contours are within   normal limits.   CT Chest Pulmonary Embolism w Contrast    Impression    IMPRESSION:    1.  No pulmonary embolism or acute thoracic aortic pathology.     2.  Multifocal pneumonia throughout the lungs, compatible with the patient's reported COVID infection.     3.  Diffuse hepatic steatosis.   Basic metabolic panel   Result Value Ref Range    Sodium 134 (L) 135 -  145 mmol/L    Potassium 3.1 (L) 3.4 - 5.3 mmol/L    Chloride 93 (L) 98 - 107 mmol/L    Carbon Dioxide (CO2) 25 22 - 29 mmol/L    Anion Gap 16 (H) 7 - 15 mmol/L    Urea Nitrogen 20.6 (H) 6.0 - 20.0 mg/dL    Creatinine 1.10 (H) 0.51 - 0.95 mg/dL    GFR Estimate 62 >60 mL/min/1.73m2    Calcium 9.2 8.8 - 10.4 mg/dL    Glucose 314 (H) 70 - 99 mg/dL   Result Value Ref Range    Troponin T, High Sensitivity 7 <=14 ng/L   D dimer quantitative   Result Value Ref Range    D-Dimer Quantitative 0.84 (H) 0.00 - 0.50 ug/mL FEU   Extra Red Top Tube   Result Value Ref Range    Hold Specimen JIC    CBC with platelets and differential   Result Value Ref Range    WBC Count 6.8 4.0 - 11.0 10e3/uL    RBC Count 4.71 3.80 - 5.20 10e6/uL    Hemoglobin 14.6 11.7 - 15.7 g/dL    Hematocrit 42.6 35.0 - 47.0 %    MCV 90 78 - 100 fL    MCH 31.0 26.5 - 33.0 pg    MCHC 34.3 31.5 - 36.5 g/dL    RDW 15.3 (H) 10.0 - 15.0 %    Platelet Count 194 150 - 450 10e3/uL    % Neutrophils 76 %    % Lymphocytes 18 %    % Monocytes 5 %    % Eosinophils 0 %    % Basophils 0 %    % Immature Granulocytes 1 %    NRBCs per 100 WBC 0 <1 /100    Absolute Neutrophils 5.1 1.6 - 8.3 10e3/uL    Absolute Lymphocytes 1.2 0.8 - 5.3 10e3/uL    Absolute Monocytes 0.3 0.0 - 1.3 10e3/uL    Absolute Eosinophils 0.0 0.0 - 0.7 10e3/uL    Absolute Basophils 0.0 0.0 - 0.2 10e3/uL    Absolute Immature Granulocytes 0.0 <=0.4 10e3/uL    Absolute NRBCs 0.0 10e3/uL       RADIOLOGY:  Reviewed all pertinent imaging. Please see official radiology report.  CT Chest Pulmonary Embolism w Contrast   Final Result   IMPRESSION:      1.  No pulmonary embolism or acute thoracic aortic pathology.       2.  Multifocal pneumonia throughout the lungs, compatible with the patient's reported COVID infection.       3.  Diffuse hepatic steatosis.      XR Chest 2 Views   Final Result   IMPRESSION: Subtle bronchovascular opacities in the right mid and left mid/lower lungs, which can be seen in the setting of  atypical infection (such as COVID). No pleural effusion or pneumothorax. Cardiac silhouette and mediastinal contours are within    normal limits.          I, Eri Gamboa, am serving as a scribe to document services personally performed by Dr. Grande based on my observation and the provider's statements to me. I, Jacques Grande MD attest that Eri Gamboa is acting in a scribe capacity, has observed my performance of the services and has documented them in accordance with my direction.    Jacques Grande M.D.  Emergency Medicine  [facility] EMERGENCY DEPARTMENT  [facility] [facility] [address]  [phone]  Dept: [phone]       Jacques Grande MD  07/25/24 4811

## 2024-07-25 NOTE — Clinical Note
Estephania Wong was seen and treated in our emergency department on 7/25/2024.  She may return to work on 07/27/2024.       If you have any questions or concerns, please don't hesitate to call.      Jacques Grande MD

## 2024-07-30 ENCOUNTER — NURSE TRIAGE (OUTPATIENT)
Dept: FAMILY MEDICINE | Facility: CLINIC | Age: 49
End: 2024-07-30
Payer: COMMERCIAL

## 2024-07-30 NOTE — TELEPHONE ENCOUNTER
Symptoms    Describe your symptoms: Very tired, little appetite, sitting still is fine but if she goes to the bathroom and back she is winded. The albuterol or the mist that was prescribed is not helping her. She is extremely tired. After using the bathroom and in between she is having poor bladder control. This technically started before covid with the bathroom issue but has got worse since getting covid. Patient is very winded and needs to talk to someone ASAP.    Any pain: No    Have you been seen for this:  Yes: she was     Appointment offered?: No    Triage offered?: Yes    Home remedies tried: nothing     Preferred Pharmacy:   [facility] [place]  [place], [place] - [address]  [address] [place] [address]  Phone: [phone] Fax: [phone]      Could we send this information to you in [employer] or would you prefer to receive a phone call?:   Patient would prefer a phone call   Okay to leave a detailed message?: Yes at Cell number on file:    Telephone Information:   Mobile [phone]

## 2024-07-30 NOTE — TELEPHONE ENCOUNTER
"Nurse Triage SBAR    Is this a 2nd Level Triage? YES, LICENSED PRACTITIONER REVIEW IS REQUIRED    Situation:  Shortness of breath    Background:  Moderate SOB, fatigue, and weakness. Covid positive almost 2 weeks ago, seen in ED 7/25 dx with pneumonia and prescribed albuterol inhaler, doxycycline, Augmentin and dexamethasone. No improvement with meds, symptoms gradually worsening. Denies fever. Significant fatigue and generalized weakness. Poor oral intake. Unable to check vitals. Moderate SOB becomes significant with minimal exertion and, per , requires several minutes to \"recover\" after a trip to use the restroom.    Assessment:  Patient needs emergent evaluation    Protocol Recommended Disposition:   Go to ED Now, See More Appropriate Protocol    Recommendation:  Disposition for patient to present to ED, care advice relayed. Patient will discuss with . Strongly encouraged them to present to ED, unsure if they will follow recommendations. Routing to PCP as disposition requires second level triage.    Routed to provider    Does the patient meet one of the following criteria for ADS visit consideration? 16+ years old, with an [facility] PCP     TIP  Providers, please consider if this condition is appropriate for management at one of our Acute and Diagnostic Services sites.     If patient is a good candidate, please use dotphrase <dot>triageresponse and select Refer to ADS to document.      Reason for Disposition   Difficulty breathing and within 14 days of COVID-19 Exposure   MODERATE difficulty breathing (e.g., speaks in phrases, SOB even at rest, pulse 100-120)    Additional Information   Negative: SEVERE difficulty breathing (e.g., struggling for each breath, speaks in single words)   Negative: Difficult to awaken or acting confused (e.g., disoriented, slurred speech)   Negative: Bluish (or gray) lips or face now   Negative: Shock suspected (e.g., cold/pale/clammy skin, too weak to stand, low BP, rapid " pulse)   Negative: Sounds like a life-threatening emergency to the triager   Negative: Diagnosed or suspected COVID-19 and symptoms lasting 3 or more weeks   Negative: COVID-19 exposure and no symptoms   Negative: COVID-19 vaccine reaction suspected (e.g., fever, headache, muscle aches) occurring 1 to 3 days after getting vaccine   Negative: COVID-19 vaccine, questions about   Negative: Lives with someone known to have influenza (flu test positive) and flu-like symptoms (e.g., cough, runny nose, sore throat, SOB; with or without fever)   Negative: Possible COVID-19 symptoms and triager concerned about severity of symptoms or other causes   Negative: COVID-19 and breastfeeding, questions about   Negative: SEVERE or constant chest pain or pressure  (Exception: Mild central chest pain, present only when coughing.)    Protocols used: Breathing Difficulty-A-[place], Coronavirus (COVID-19) Diagnosed or [id number]

## 2024-07-30 NOTE — TELEPHONE ENCOUNTER
Provider Response to 2nd Level Triage Request    I have reviewed the RN documentation. My recommendation is: Agree with prompt re-evaluation in person given worsening symptoms, may need timely imaging. Should be evaluated somewhere where these services are available for immediate use. ADS? ED? UC?    Damaris Son, DO

## 2024-08-15 ENCOUNTER — OFFICE VISIT (OUTPATIENT)
Dept: FAMILY MEDICINE | Facility: CLINIC | Age: 49
End: 2024-08-15
Payer: COMMERCIAL

## 2024-08-15 VITALS
WEIGHT: 293 LBS | HEIGHT: 66 IN | SYSTOLIC BLOOD PRESSURE: 102 MMHG | TEMPERATURE: 98.9 F | DIASTOLIC BLOOD PRESSURE: 85 MMHG | OXYGEN SATURATION: 97 % | BODY MASS INDEX: 47.09 KG/M2 | HEART RATE: 105 BPM

## 2024-08-15 DIAGNOSIS — Z79.899 CONTROLLED SUBSTANCE AGREEMENT SIGNED: ICD-10-CM

## 2024-08-15 DIAGNOSIS — E78.2 MIXED HYPERLIPIDEMIA: ICD-10-CM

## 2024-08-15 DIAGNOSIS — G43.909 MIGRAINE WITHOUT STATUS MIGRAINOSUS, NOT INTRACTABLE, UNSPECIFIED MIGRAINE TYPE: ICD-10-CM

## 2024-08-15 DIAGNOSIS — K21.9 GASTROESOPHAGEAL REFLUX DISEASE, UNSPECIFIED WHETHER ESOPHAGITIS PRESENT: ICD-10-CM

## 2024-08-15 DIAGNOSIS — R79.89 HIGH SERUM FERRITIN: ICD-10-CM

## 2024-08-15 DIAGNOSIS — Z00.00 ANNUAL PHYSICAL EXAM: Primary | ICD-10-CM

## 2024-08-15 DIAGNOSIS — N30.00 ACUTE CYSTITIS WITHOUT HEMATURIA: ICD-10-CM

## 2024-08-15 DIAGNOSIS — I10 HYPERTENSION, UNSPECIFIED TYPE: ICD-10-CM

## 2024-08-15 DIAGNOSIS — E66.01 MORBID OBESITY (H): ICD-10-CM

## 2024-08-15 DIAGNOSIS — E11.65 TYPE 2 DIABETES MELLITUS WITH HYPERGLYCEMIA, WITHOUT LONG-TERM CURRENT USE OF INSULIN (H): ICD-10-CM

## 2024-08-15 DIAGNOSIS — G47.00 INSOMNIA, UNSPECIFIED TYPE: ICD-10-CM

## 2024-08-15 DIAGNOSIS — R06.02 SOB (SHORTNESS OF BREATH): ICD-10-CM

## 2024-08-15 DIAGNOSIS — M25.569 KNEE PAIN, UNSPECIFIED CHRONICITY, UNSPECIFIED LATERALITY: ICD-10-CM

## 2024-08-15 DIAGNOSIS — M79.7 FIBROMYALGIA: ICD-10-CM

## 2024-08-15 DIAGNOSIS — E03.8 SUBCLINICAL HYPOTHYROIDISM: ICD-10-CM

## 2024-08-15 DIAGNOSIS — Z23 IMMUNIZATION DUE: ICD-10-CM

## 2024-08-15 DIAGNOSIS — L65.8 FEMALE PATTERN HAIR LOSS: ICD-10-CM

## 2024-08-15 DIAGNOSIS — M54.50 LOW BACK PAIN, UNSPECIFIED BACK PAIN LATERALITY, UNSPECIFIED CHRONICITY, UNSPECIFIED WHETHER SCIATICA PRESENT: ICD-10-CM

## 2024-08-15 DIAGNOSIS — R73.03 PREDIABETES: ICD-10-CM

## 2024-08-15 DIAGNOSIS — G25.81 RESTLESS LEGS SYNDROME (RLS): ICD-10-CM

## 2024-08-15 PROBLEM — E83.119 HEMOCHROMATOSIS: Status: ACTIVE | Noted: 2024-08-15

## 2024-08-15 LAB
ALBUMIN UR-MCNC: 100 MG/DL
APPEARANCE UR: ABNORMAL
BACTERIA #/AREA URNS HPF: ABNORMAL /HPF
BILIRUB UR QL STRIP: ABNORMAL
COLOR UR AUTO: ABNORMAL
GLUCOSE UR STRIP-MCNC: 100 MG/DL
HBA1C MFR BLD: 11.6 % (ref 0–5.6)
HGB UR QL STRIP: NEGATIVE
KETONES UR STRIP-MCNC: 15 MG/DL
LEUKOCYTE ESTERASE UR QL STRIP: ABNORMAL
NITRATE UR QL: POSITIVE
PH UR STRIP: 6.5 [PH] (ref 5–8)
RBC #/AREA URNS AUTO: ABNORMAL /HPF
SP GR UR STRIP: 1.02 (ref 1–1.03)
SQUAMOUS #/AREA URNS AUTO: ABNORMAL /LPF
UROBILINOGEN UR STRIP-ACNC: 2 E.U./DL
WBC #/AREA URNS AUTO: ABNORMAL /HPF

## 2024-08-15 PROCEDURE — 81001 URINALYSIS AUTO W/SCOPE: CPT | Mod: 59 | Performed by: FAMILY MEDICINE

## 2024-08-15 PROCEDURE — 87186 SC STD MICRODIL/AGAR DIL: CPT | Performed by: FAMILY MEDICINE

## 2024-08-15 PROCEDURE — 87086 URINE CULTURE/COLONY COUNT: CPT | Performed by: FAMILY MEDICINE

## 2024-08-15 PROCEDURE — 36415 COLL VENOUS BLD VENIPUNCTURE: CPT | Performed by: FAMILY MEDICINE

## 2024-08-15 PROCEDURE — 80053 COMPREHEN METABOLIC PANEL: CPT | Performed by: FAMILY MEDICINE

## 2024-08-15 PROCEDURE — 99214 OFFICE O/P EST MOD 30 MIN: CPT | Mod: 25 | Performed by: FAMILY MEDICINE

## 2024-08-15 PROCEDURE — 84443 ASSAY THYROID STIM HORMONE: CPT | Performed by: FAMILY MEDICINE

## 2024-08-15 PROCEDURE — 80061 LIPID PANEL: CPT | Performed by: FAMILY MEDICINE

## 2024-08-15 PROCEDURE — 80307 DRUG TEST PRSMV CHEM ANLYZR: CPT | Performed by: FAMILY MEDICINE

## 2024-08-15 PROCEDURE — 83036 HEMOGLOBIN GLYCOSYLATED A1C: CPT | Performed by: FAMILY MEDICINE

## 2024-08-15 PROCEDURE — 99396 PREV VISIT EST AGE 40-64: CPT | Performed by: FAMILY MEDICINE

## 2024-08-15 RX ORDER — ROPINIROLE 0.5 MG/1
TABLET, FILM COATED ORAL
Qty: 180 TABLET | Refills: 3 | Status: SHIPPED | OUTPATIENT
Start: 2024-08-15

## 2024-08-15 RX ORDER — BUPROPION HYDROCHLORIDE 300 MG/1
300 TABLET ORAL EVERY MORNING
Qty: 90 TABLET | Refills: 3 | Status: SHIPPED | OUTPATIENT
Start: 2024-08-15

## 2024-08-15 RX ORDER — SPIRONOLACTONE 100 MG/1
100 TABLET, FILM COATED ORAL DAILY
Qty: 90 TABLET | Refills: 3 | Status: SHIPPED | OUTPATIENT
Start: 2024-08-15

## 2024-08-15 RX ORDER — CELECOXIB 100 MG/1
100 CAPSULE ORAL 2 TIMES DAILY
Qty: 180 CAPSULE | Refills: 1 | Status: SHIPPED | OUTPATIENT
Start: 2024-08-15

## 2024-08-15 RX ORDER — PROPRANOLOL HYDROCHLORIDE 80 MG/1
80 CAPSULE, EXTENDED RELEASE ORAL DAILY
Qty: 90 CAPSULE | Refills: 3 | Status: SHIPPED | OUTPATIENT
Start: 2024-08-15

## 2024-08-15 RX ORDER — TIZANIDINE 2 MG/1
2 TABLET ORAL AT BEDTIME
Qty: 90 TABLET | Refills: 1 | Status: SHIPPED | OUTPATIENT
Start: 2024-08-15

## 2024-08-15 RX ORDER — LEVOFLOXACIN 750 MG/1
750 TABLET, FILM COATED ORAL DAILY
Qty: 7 TABLET | Refills: 0 | Status: SHIPPED | OUTPATIENT
Start: 2024-08-15 | End: 2024-08-22

## 2024-08-15 RX ORDER — MINOXIDIL 2 %
SOLUTION, NON-ORAL TOPICAL 2 TIMES DAILY
Qty: 60 ML | Refills: 11 | Status: SHIPPED | OUTPATIENT
Start: 2024-08-15

## 2024-08-15 RX ORDER — HYDROCHLOROTHIAZIDE 25 MG/1
25 TABLET ORAL DAILY
Qty: 90 TABLET | Refills: 3 | Status: SHIPPED | OUTPATIENT
Start: 2024-08-15

## 2024-08-15 SDOH — HEALTH STABILITY: PHYSICAL HEALTH: ON AVERAGE, HOW MANY MINUTES DO YOU ENGAGE IN EXERCISE AT THIS LEVEL?: 0 MIN

## 2024-08-15 SDOH — HEALTH STABILITY: PHYSICAL HEALTH: ON AVERAGE, HOW MANY DAYS PER WEEK DO YOU ENGAGE IN MODERATE TO STRENUOUS EXERCISE (LIKE A BRISK WALK)?: 0 DAYS

## 2024-08-15 ASSESSMENT — SOCIAL DETERMINANTS OF HEALTH (SDOH): HOW OFTEN DO YOU GET TOGETHER WITH FRIENDS OR RELATIVES?: NEVER

## 2024-08-15 ASSESSMENT — PATIENT HEALTH QUESTIONNAIRE - PHQ9
10. IF YOU CHECKED OFF ANY PROBLEMS, HOW DIFFICULT HAVE THESE PROBLEMS MADE IT FOR YOU TO DO YOUR WORK, TAKE CARE OF THINGS AT HOME, OR GET ALONG WITH OTHER PEOPLE: VERY DIFFICULT
SUM OF ALL RESPONSES TO PHQ QUESTIONS 1-9: 14
SUM OF ALL RESPONSES TO PHQ QUESTIONS 1-9: 14

## 2024-08-15 NOTE — PROGRESS NOTES
Preventive Care Visit  [facility]  Damaris Son DO, Family Medicine  Aug 15, 2024      Assessment & Plan     1. Annual physical exam  Reviewed health history and health maintenance recommendations.       2. Acute cystitis without hematuria  - UA Macroscopic with reflex to Microscopic and Culture - Clinic Collect  - UA Microscopic with Reflex to Culture  - Urine Culture  - levofloxacin (LEVAQUIN) 750 MG tablet; Take 1 tablet (750 mg) by mouth daily for 7 days  Dispense: 7 tablet; Refill: 0    Urine appears infected. She has been on many antibiotics recently, she has intermittent symptoms that haven't fully resolved. Will plan to treat with Levaquin. Await culture results.     3. Controlled substance agreement signed - 8/15/24  4. Insomnia, unspecified type  - PRIMARY CARE FOLLOW-UP SCHEDULING; Future  - Urine Drug Screen; Future  - Urine Drug Screen    Continue Ambien for sleep, clonazepam and lyrica for RLS symptoms.   Updated UDS, PHQ9, GAD7, and CSA today.   She is interested in adding tramadol for fibro flares. Given number of other sedating substances, recommend she consult her pain clinic first for recommendations. Once stabilized, I can take over regimen.     5. Knee pain, unspecified chronicity, unspecified laterality  6. Low back pain, unspecified back pain laterality, unspecified chronicity, unspecified whether sciatica present  - celecoxib (CELEBREX) 100 MG capsule; Take 1 capsule (100 mg) by mouth 2 times daily  Dispense: 180 capsule; Refill: 1  - Comprehensive metabolic panel (BMP + Alb, Alk Phos, ALT, AST, Total. Bili, TP)    Continue Celebrex 100mg BID. Follow up kidney function. She is taking PPI for GERD symptoms, adequately controlled.     7. Morbid obesity (H)  - buPROPion (WELLBUTRIN XL) 300 MG 24 hr tablet; Take 1 tablet (300 mg) by mouth every morning  Dispense: 90 tablet; Refill: 3  - Comprehensive metabolic panel (BMP + Alb, Alk Phos, ALT, AST, Total. Bili,  TP)    Is losing weight with lifestyle modifications. Has sought outside clinic for GLP-1 management for weight loss though hasn't started yet with numerous recent illnesses. Will increase wellbutrin from 150mg daily to 300mg daily which should help with both mood and weight management.     8. Hypertension, unspecified type  - hydrochlorothiazide (HYDRODIURIL) 25 MG tablet; Take 1 tablet (25 mg) by mouth daily  Dispense: 90 tablet; Refill: 3  - Comprehensive metabolic panel (BMP + Alb, Alk Phos, ALT, AST, Total. Bili, TP)    Blood pressure has been lower recently, suspect positive change from weight loss. Recommend we reduce dose of hydrochlorothiazide from 50mg daily to 25mg daily. Continue propranolol and spironolactone for other indications.     9. Female pattern hair loss  - minoxidil (ROGAINE WOMENS) 2 % external solution; Apply topically 2 times daily  Dispense: 60 mL; Refill: 11  - spironolactone (ALDACTONE) 100 MG tablet; Take 1 tablet (100 mg) by mouth daily  Dispense: 90 tablet; Refill: 3    Noticing improvements, continue current treatment regimen.     10. Migraine without status migrainosus, not intractable, unspecified migraine type  - propranolol ER (INDERAL LA) 80 MG 24 hr capsule; Take 1 capsule (80 mg) by mouth daily  Dispense: 90 capsule; Refill: 3    Continue propranolol for prophylaxis. Neuro managing Ubrelvy, nortriptyline, and Maxalt.     11. Restless legs syndrome (RLS)  - rOPINIRole (REQUIP) 0.5 MG tablet; Take 1 tablet  (0.5mg) 2.5 hours before bedtime. Take second tablet (0.5mg) at bedtime.  Dispense: 180 tablet; Refill: 3    Continue ropinirole, lyrica, and clonazepam.     12. Fibromyalgia  - tiZANidine (ZANAFLEX) 2 MG tablet; Take 1 tablet (2 mg) by mouth at bedtime Take 1/2 to 1 tablet by mouth every night at bedtime as needed for muscle spasms.  Dispense: 90 tablet; Refill: 1    Continue venlafaxine (managed by outside provider), tizanidine as needed (cautious use with baclofen),  "healthy lifestyle habits. Interested in further pain management for fibro flares as needed, see above.     13. Prediabetes  - Hemoglobin A1c    Due for surveillance A1c.     High serum ferritin  Diagnosis list from [facility] states hemochromatosis though patient states testing ruled this out. I do not see results in her chart. She does continue to follow annually with [facility] given elevated ferritin levels. Recheck CBC and CMP today.     15. Subclinical hypothyroidism  - TSH with free T4 reflex    Due surveillance labs.     16. Mixed hyperlipidemia  - Lipid panel reflex to direct LDL Non-fasting    Surveillance labs, update ASCVD risk.     17. SOB (shortness of breath)  - fluticasone (FLOVENT DISKUS) 100 MCG/ACT inhaler; Inhale 1 puff into the lungs every 12 hours  Dispense: 60 each; Refill: 0    Onset symptoms s/p covid and pneumonia. Albuterol not helping. Trial Flovent ICS. If symptoms not improving, consider PFTs.     18. Gastroesophageal reflux disease, unspecified whether esophagitis present  Continues PPI, symptoms under good control.     19. Immunization due  Plan for PCV-20 and Hep B vaccination, though deferred today due to recent steroid administration.        Patient has been advised of split billing requirements and indicates understanding: Yes        BMI  Estimated body mass index is 52.33 kg/m  as calculated from the following:    Height as of this encounter: 1.676 m (5' 6\").    Weight as of this encounter: 147.1 kg (324 lb 3.2 oz).   Weight management plan: Discussed healthy diet and exercise guidelines    Depression Screening Follow Up        8/15/2024    11:43 AM   PHQ   PHQ-9 Total Score 14   Q9: Thoughts of better off dead/self-harm past 2 weeks Not at all         Subjective   Naheed is a 48 year old, presenting for the following:  Physical (Has fibromyalgia attacks due to weather and wondering if there is any thing else she can take for the pain as it takes her down.  With a hx of pneumonia " twice, she would like to discuss getting the pneumonia vaccine.  Slow to wake up in the morning, is there anything she can do for more energy in the morning.  ), UTI (Recurrent UTI), and Follow Up (Follow up COVID.  Was dx with pneumonia.  Still very fatigued and lightheaded with activity.  Feels like she is going to black out.  Also unable to walk in a straight line.  Seems like walls and doors have magnets in them.  Was seen by a home care facility that gave her IV of vitamins and supplements, which did help, but not back to normal. )        8/15/2024     2:38 PM   Additional Questions   Roomed by QUE Downey CMA([facility])        Health Care Directive  Patient does not have a Health Care Directive or Living Will: Discussed advance care planning with patient; however, patient declined at this time.    Annual physical exam  - Hep B: will do today    - Pap: plans to see GYN for pap     Dysuria  Has had some lingering UTI symptoms, would improve slightly with antibiotics for dental abscess. Did virtual visit, treated something else, maybe not long enough?     Amoxicillin, augmentin, doxycycline, penicillin, cephalexin, clindamycin, nitrofurantoin, terconazole.     Urine sample checked today.    Recent Results (from the past 24 hour(s))   UA Macroscopic with reflex to Microscopic and Culture - Clinic Collect    Collection Time: 08/15/24  3:17 PM    Specimen: Urine, Clean Catch   Result Value Ref Range    Color Urine Dark Yellow (A) Colorless, Straw, Light Yellow, Yellow    Appearance Urine Slightly Cloudy (A) Clear    Glucose Urine 100 (A) Negative mg/dL    Bilirubin Urine Small (A) Negative    Ketones Urine 15 (A) Negative mg/dL    Specific Gravity Urine 1.020 1.005 - 1.030    Blood Urine Negative Negative    pH Urine 6.5 5.0 - 8.0    Protein Albumin Urine 100 (A) Negative mg/dL    Urobilinogen Urine 2.0 (A) 0.2, 1.0 E.U./dL    Nitrite Urine Positive (A) Negative    Leukocyte Esterase Urine Trace (A) Negative   UA  Microscopic with Reflex to Culture    Collection Time: 08/15/24  3:17 PM   Result Value Ref Range    Bacteria Urine Many (A) None Seen /HPF    RBC Urine None Seen 0-2 /HPF /HPF    WBC Urine 5-10 (A) 0-5 /HPF /HPF    Squamous Epithelials Urine Few (A) None Seen /LPF          Controlled substance agreement signed - 8/15/24  Insomnia, unspecified type  Knee pain, unspecified chronicity, unspecified laterality  Low back pain, unspecified back pain laterality, unspecified chronicity, unspecified whether sciatica present  Ambien: continues to take    Clonazepam: clonazepam   Lyrica: 1 tab BID    Tizanidine: daily  Baclofen: as needed   Methocarbamol: not currently taking, has some supply at home still.    Celexa: using twice a day consistently, reflux under decent control   Tylenol/Tylenol PM: yes          8/9/2023     9:47 AM 3/5/2024     4:52 PM 8/15/2024    11:43 AM   PHQ   PHQ-9 Total Score 8 14 14   Q9: Thoughts of better off dead/self-harm past 2 weeks Not at all Not at all Not at all         4/11/2023    12:30 PM 8/9/2023     9:47 AM 3/5/2024     4:52 PM   CHANCE-7 SCORE   Total Score 3 (minimal anxiety) 4 (minimal anxiety) 4 (minimal anxiety)   Total Score 3 4 4     Wellbutrin 150mg, venlafaxine 37.5.   Nortriptyline prescribed by someone different then venlafaxine.       Morbid obesity (H)  Body mass index is 52.33 kg/m .  Wt Readings from Last 4 Encounters:   08/15/24 147.1 kg (324 lb 3.2 oz)   07/25/24 149.7 kg (330 lb)   03/05/24 (!) 162.5 kg (358 lb 4 oz)   08/09/23 (!) 164.2 kg (362 lb)       Hypertension, unspecified type  BP Readings from Last 6 Encounters:   08/15/24 102/85   07/25/24 125/75   04/23/24 136/63   03/05/24 133/87   08/09/23 134/84   04/11/23 120/78     GFR Estimate   Date Value Ref Range Status   07/25/2024 62 >60 mL/min/1.73m2 Final     Comment:     eGFR calculated using 2021 CKD-EPI equation.   06/28/2019 >60 >60 mL/min/1.73m2 Final   11/23/2015 75 >60 mL/min/1.7m2 Final     Comment:     Non   GFR Calc     Propranolol 80 mg once daily, spironolactone 100 mg once daily, hydrochlorothiazide 50mg daily.      Female pattern hair loss  Spironolactone, Rogaine women.  Feels like is responding, still has some thin areas.       Migraine without status migrainosus, not intractable, unspecified migraine type  Ubrelvy and rizatriptan.      Restless legs syndrome (RLS)  Lyrica, ropinirole  Feels like overall stable, maybe more ramped up with stress.       Fibromyalgia  Off Robaxin, currently using baclofen, venlafaxine, Lyrica.    Concerned with fibro storms - has been on tramadol in the past for this, found it helpful when took an old one.   Will check in with the pain clinic.       Prediabetes  Hemoglobin A1C   Date Value Ref Range Status   08/09/2023 5.7 (H) 0.0 - 5.6 % Final     Comment:     Normal <5.7%   Prediabetes 5.7-6.4%    Diabetes 6.5% or higher     Note: Adopted from ADA consensus guidelines.   08/25/2009 5.7 4.3 - 6.0 % Final         High ferritin  Elevated ferritin, genetic testing with [facility] reveals hemochromatosis.  States was actually ruled out by genetic testing. Is seeing them annually.    Subclinical hypothyroidism  Lab Results   Component Value Date    TSH 3.04 08/09/2023    TSH 4.57 08/25/2009       Mixed hyperlipidemia  The 10-year ASCVD risk score (Eleonora ZAMORA, et al., 2019) is: 1.4%    Values used to calculate the score:      Age: 48 years      Sex: Female      Is Non- : No      Diabetic: No      Tobacco smoker: No      Systolic Blood Pressure: 102 mmHg      Is BP treated: Yes      HDL Cholesterol: 43 mg/dL      Total Cholesterol: 222 mg/dL     Lab Results   Component Value Date    CHOL 222 08/09/2023    CHOL 204 04/29/2016     Lab Results   Component Value Date    HDL 43 08/09/2023    HDL 41 04/29/2016     Lab Results   Component Value Date     08/09/2023     04/29/2016     Lab Results   Component Value Date    TRIG 160  08/09/2023    TRIG 120 04/29/2016         SOB (shortness of breath)  Albuterol as needed, doesn't think it is helping.   Recent covid.   No wheeze, slight chest tightness with exertion.   Is starting to notice improvements.       Gastroesophageal reflux disease, unspecified whether esophagitis present  Omeprazole, symptoms under good control.            8/15/2024   General Health   How would you rate your overall physical health? (!) FAIR   Feel stress (tense, anxious, or unable to sleep) To some extent      (!) STRESS CONCERN      8/15/2024   Nutrition   Three or more servings of calcium each day? Yes   Diet: Regular (no restrictions)   How many servings of fruit and vegetables per day? (!) 0-1   How many sweetened beverages each day? 0-1            8/15/2024   Exercise   Days per week of moderate/strenous exercise 0 days   Average minutes spent exercising at this level 0 min      (!) EXERCISE CONCERN      8/15/2024   Social Factors   Frequency of gathering with friends or relatives Never   Worry food won't last until get money to buy more No   Food not last or not have enough money for food? No   Do you have housing? (Housing is defined as stable permanent housing and does not include staying ouside in a car, in a tent, in an abandoned building, in an overnight shelter, or couch-surfing.) Yes   Are you worried about losing your housing? No   Lack of transportation? No   Unable to get utilities (heat,electricity)? No      (!) SOCIAL CONNECTIONS CONCERN      8/15/2024   Dental   Dentist two times every year? Yes            8/15/2024   TB Screening   Were you born outside of the [place]? No          Today's PHQ-9 Score:       8/15/2024    11:43 AM   PHQ-9 SCORE   PHQ-9 Total Score MyChart 14 (Moderate depression)   PHQ-9 Total Score 14         8/15/2024   Substance Use   Alcohol more than 3/day or more than 7/wk No   Do you use any other substances recreationally? No        Social History     Tobacco Use    Smoking  status: Never     Passive exposure: Never    Smokeless tobacco: Never   Vaping Use    Vaping status: Some Days   Substance Use Topics    Alcohol use: Never    Drug use: Yes     Types: Marijuana     Comment: Was prescribed medical cannabis in 2020 for fibro pain             8/15/2024   Breast Cancer Screening   Family history of breast, colon, or ovarian cancer? Yes          8/15/2024   LAST FHS-7 RESULTS   1st degree relative breast or ovarian cancer No   Any relative bilateral breast cancer No   Any male have breast cancer No   Any ONE woman have BOTH breast AND ovarian cancer No   Any woman with breast cancer before 50yrs No   2 or more relatives with breast AND/OR ovarian cancer No   2 or more relatives with breast AND/OR bowel cancer No           Mammogram Screening - Mammogram every 1-2 years updated in Health Maintenance based on mutual decision making        8/15/2024   STI Screening   New sexual partner(s) since last STI/HIV test? No        History of abnormal Pap smear: No - age 30- 64 PAP with HPV every 5 years recommended          7/25/2019     4:24 PM   PAP / HPV   PAP-ABSTRACT See Scanned Document           This result is from an external source.     ASCVD Risk   The 10-year ASCVD risk score (Eleonora ZAMORA, et al., 2019) is: 1.4%    Values used to calculate the score:      Age: 48 years      Sex: Female      Is Non- : No      Diabetic: No      Tobacco smoker: No      Systolic Blood Pressure: 102 mmHg      Is BP treated: Yes      HDL Cholesterol: 43 mg/dL      Total Cholesterol: 222 mg/dL        8/15/2024   Contraception/Family Planning   Questions about contraception or family planning No           Reviewed and updated as needed this visit by Provider   Tobacco  Allergies  Meds  Problems  Med Hx  Surg Hx  Fam Hx                Review of Systems  Constitutional, HEENT, cardiovascular, pulmonary, GI, , musculoskeletal, neuro, skin, endocrine and psych systems are  "negative, except as otherwise noted.     Objective    Exam  /85   Pulse 105   Temp 98.9  F (37.2  C) (Oral)   Wt 147.1 kg (324 lb 3.2 oz)   BMI 52.33 kg/m     Estimated body mass index is 52.33 kg/m  as calculated from the following:    Height as of 7/25/24: 1.676 m (5' 6\").    Weight as of this encounter: 147.1 kg (324 lb 3.2 oz).    Physical Exam    GENERAL: alert, obese, and no distress  EYES: Eyes grossly normal to inspection, PERRL and conjunctivae and sclerae normal  HENT: ear canals and TM's normal, nose and mouth without ulcers or lesions  NECK: no adenopathy, no asymmetry, masses, or scars  RESP: lungs clear to auscultation - no rales, rhonchi or wheezes  CV: regular rate and rhythm, no murmur, click or rub, no peripheral edema  ABDOMEN: soft, nontender, no hepatosplenomegaly, no masses and bowel sounds normal  MS: no gross musculoskeletal defects noted, no edema  SKIN: no suspicious lesions or rashes  NEURO: Normal strength and tone, mentation intact and speech normal  PSYCH: mentation appears normal, affect normal/bright        Signed Electronically by: Damaris Son, DO    Answers submitted by the patient for this visit:  Patient Health Questionnaire (Submitted on 8/15/2024)  If you checked off any problems, how difficult have these problems made it for you to do your work, take care of things at home, or get along with other people?: Very difficult  PHQ9 TOTAL SCORE: 14    "

## 2024-08-15 NOTE — LETTER
[facility]  08/15/24  Patient: Estephania Wong  YOB: 1975  Medical Record Number: [id number]                                                                                  Non-Opioid Controlled Substance Agreement    This is an agreement between you and your provider regarding safe and appropriate use of controlled substances prescribed by your care team. Controlled substances are?medicines that can cause physical and mental dependence (abuse).     There are strict laws about having and using these medicines. We here at [facility] are  committed to working with you in your efforts to get better. To support you in this work, we'll help you schedule regular office appointments for medicine refills. If we must cancel or change your appointment for any reason, we'll make sure you have enough medicine to last until your next appointment.     As a Provider, I will:   Listen carefully to your concerns while treating you with respect.   Recommend a treatment plan that I believe is in your best interest and may involve therapies other than medicine.    Talk with you often about the possible benefits and the risk of harm of any medicine that we prescribe for you.  Assess the safety of this medicine and check how well it works.    Provide a plan on how to taper (discontinue or go off) using this medicine if the decision is made to stop its use.      ::  As a Patient, I understand controlled substances:     Are prescribed by my care provider to help me function or work and manage my condition(s).?  Are strong medicines and can cause serious side effects.     Need to be taken exactly as prescribed.?Combining controlled substances with certain medicines or chemicals (such as illegal drugs, alcohol, sedatives, sleeping pills, and benzodiazepines) can be dangerous or even fatal.? If I stop taking my medicines suddenly, I may have severe withdrawal symptoms.     The risks,  benefits, and side effects of these medicine(s) were explained to me. I agree that:    I will take part in other treatments as advised by my care team. This may be psychiatry or counseling, physical therapy, behavioral therapy, group treatment or a referral to specialist.    I will keep all my appointments and understand this is part of the monitoring of controlled substances.?My care team may require an office visit for EVERY controlled substance refill. If I miss appointments or don t follow instructions, my care team may stop my medicine    I will take my medicines as prescribed. I will not change the dose or schedule unless my care team tells me to. There will be no refills if I run out early.      I may be asked to come to the clinic and complete a urine drug test or complete a pill count. If I don t give a urine sample or participate in a pill count, the care team may stop my medicine.    I will only receive controlled substance prescriptions from this clinic. If I am treated by another provider, I will tell them that I am taking controlled substances and that I have a treatment agreement with this provider. I will inform my [facility] care team within one business day if I am given a prescription for any controlled substance by another healthcare provider. My [facility] care team can contact other providers and pharmacists about my use of any medicines.    It is up to me to make sure that I don't run out of my medicines on weekends or holidays.?If my care team is willing to refill my prescription without a visit, I must request refills only during office hours. Refills may take up to 3 business days to process. I will use one pharmacy to fill all my controlled substance prescriptions. I will notify the clinic about any changes to my insurance or medicine availability.    I am responsible for my prescriptions. If the medicine/prescription is lost, stolen or destroyed, it will not be replaced.?I  also agree not to share controlled substance medicines with anyone.     I am aware I should not use any illegal or recreational drugs. I agree not to drink alcohol unless my care team says I can.     If I enroll in the [place] Medical Cannabis program, I will tell my care team before my next refill.    I will tell my care team right away if I become pregnant, have a new medical problem treated outside of my regular clinic, or have a change in my medicines.     I understand that this medicine can affect my thinking, judgment and reaction time.? Alcohol and drugs affect the brain and body, which can affect the safety of my driving. Being under the influence of alcohol or drugs can affect my decision-making, behaviors, personal safety and the safety of others. Driving while impaired (DWI) can occur if a person is driving, operating or in physical control of a car, motorcycle, boat, snowmobile, ATV, motorbike, off-road vehicle or any other motor vehicle ([place] Statute 169A.20). I understand the risk if I choose to drive or operate any vehicle or machinery.    I understand that if I do not follow any of the conditions above, my prescriptions or treatment may be stopped or changed.   I agree that my provider, clinic care team and pharmacy may work with any city, state or federal law enforcement agency that investigates the misuse, sale or other diversion of my controlled medicine. I will allow my provider to discuss my care with, or share a copy of, this agreement with any other treating provider, pharmacy or emergency room where I receive care.     I have read this agreement and have asked questions about anything I did not understand.    ________________________________________________________  Patient Signature - Estephania Wong     ___________________                   Date     ________________________________________________________  Provider Signature - Damaris Son, DO       ___________________                    Date     ________________________________________________________  Witness Signature (required if provider not present while patient signing)          ___________________                   Date

## 2024-08-15 NOTE — PROGRESS NOTES
Prior to immunization administration, verified patients identity using patient s name and date of birth. Please see Immunization Activity for additional information.     Screening Questionnaire for Adult Immunization    Are you sick today?   No   Do you have allergies to medications, food, a vaccine component or latex?   No   Have you ever had a serious reaction after receiving a vaccination?   No   Do you have a long-term health problem with heart, lung, kidney, or metabolic disease (e.g., diabetes), asthma, a blood disorder, no spleen, complement component deficiency, a cochlear implant, or a spinal fluid leak?  Are you on long-term aspirin therapy?   No   Do you have cancer, leukemia, HIV/AIDS, or any other immune system problem?   No   Do you have a parent, brother, or sister with an immune system problem?   No   In the past 3 months, have you taken medications that affect  your immune system, such as prednisone, other steroids, or anticancer drugs; drugs for the treatment of rheumatoid arthritis, Crohn s disease, or psoriasis; or have you had radiation treatments?   Yes   Have you had a seizure, or a brain or other nervous system problem?   No   During the past year, have you received a transfusion of blood or blood    products, or been given immune (gamma) globulin or antiviral drug?   No   For women: Are you pregnant or is there a chance you could become       pregnant during the next month?   No   Have you received any vaccinations in the past 4 weeks?   No     Immunization questionnaire was positive for at least one answer.  Notified Dr. Son.      Patient instructed to remain in clinic for 15 minutes afterwards, and to report any adverse reactions.     Screening performed by Arleth Downey MA on 8/15/2024 at 4:30 PM.

## 2024-08-16 ENCOUNTER — TELEPHONE (OUTPATIENT)
Dept: FAMILY MEDICINE | Facility: CLINIC | Age: 49
End: 2024-08-16

## 2024-08-16 DIAGNOSIS — E11.65 TYPE 2 DIABETES MELLITUS WITH HYPERGLYCEMIA, WITHOUT LONG-TERM CURRENT USE OF INSULIN (H): Primary | ICD-10-CM

## 2024-08-16 PROBLEM — E11.9 DIABETES MELLITUS, TYPE 2 (H): Status: ACTIVE | Noted: 2024-08-16

## 2024-08-16 LAB
ALBUMIN SERPL BCG-MCNC: 3.9 G/DL (ref 3.5–5.2)
ALP SERPL-CCNC: 67 U/L (ref 40–150)
ALT SERPL W P-5'-P-CCNC: 35 U/L (ref 0–50)
AMPHETAMINES UR QL SCN: ABNORMAL
ANION GAP SERPL CALCULATED.3IONS-SCNC: 13 MMOL/L (ref 7–15)
AST SERPL W P-5'-P-CCNC: 20 U/L (ref 0–45)
BARBITURATES UR QL SCN: ABNORMAL
BENZODIAZ UR QL SCN: ABNORMAL
BILIRUB SERPL-MCNC: 1 MG/DL
BUN SERPL-MCNC: 14.7 MG/DL (ref 6–20)
BZE UR QL SCN: ABNORMAL
CALCIUM SERPL-MCNC: 9.7 MG/DL (ref 8.8–10.4)
CANNABINOIDS UR QL SCN: ABNORMAL
CHLORIDE SERPL-SCNC: 93 MMOL/L (ref 98–107)
CHOLEST SERPL-MCNC: 245 MG/DL
CREAT SERPL-MCNC: 1.05 MG/DL (ref 0.51–0.95)
EGFRCR SERPLBLD CKD-EPI 2021: 66 ML/MIN/1.73M2
FASTING STATUS PATIENT QL REPORTED: YES
FASTING STATUS PATIENT QL REPORTED: YES
FENTANYL UR QL: ABNORMAL
GLUCOSE SERPL-MCNC: 261 MG/DL (ref 70–99)
HCO3 SERPL-SCNC: 29 MMOL/L (ref 22–29)
HDLC SERPL-MCNC: 57 MG/DL
LDLC SERPL CALC-MCNC: 150 MG/DL
NONHDLC SERPL-MCNC: 188 MG/DL
OPIATES UR QL SCN: ABNORMAL
PCP QUAL URINE (ROCHE): ABNORMAL
POTASSIUM SERPL-SCNC: 4 MMOL/L (ref 3.4–5.3)
PROT SERPL-MCNC: 7.4 G/DL (ref 6.4–8.3)
SODIUM SERPL-SCNC: 135 MMOL/L (ref 135–145)
TRIGL SERPL-MCNC: 189 MG/DL
TSH SERPL DL<=0.005 MIU/L-ACNC: 3.98 UIU/ML (ref 0.3–4.2)

## 2024-08-16 RX ORDER — ROSUVASTATIN CALCIUM 10 MG/1
10 TABLET, COATED ORAL DAILY
Qty: 90 TABLET | Refills: 3 | Status: SHIPPED | OUTPATIENT
Start: 2024-08-16

## 2024-08-16 NOTE — PROGRESS NOTES
Type 2 diabetes mellitus with hyperglycemia, without long-term current use of insulin (H)  - Adult Diabetes Education  Referral; Future  - rosuvastatin (CRESTOR) 10 MG tablet; Take 1 tablet (10 mg) by mouth daily  Dispense: 90 tablet; Refill: 3     Damaris Son DO

## 2024-08-16 NOTE — TELEPHONE ENCOUNTER
----- Message from Damaris Son sent at 8/16/2024  1:51 PM CDT -----  Attempted to call patient but patient did not answer phone.  Please try again later.  A1c has returned quite elevated at 11.6.  I think this is part of the reason she is having urinary frequency and feeling unwell.  Please help coordinate an appointment with diabetic educator.  I will place referral in the chart.  Please clarify what GLP-1 product she has received for weight management.  This will also be a great tool for diabetes management.  I can attempt to prescribe for insurance coverage now that we have diagnosed her with diabetes.  Please help coordinate a med check/diabetes follow up visit with me in 1 month.   Damaris Son, DO

## 2024-08-16 NOTE — RESULT ENCOUNTER NOTE
Attempted to call patient but patient did not answer phone.  Please try again later.  A1c has returned quite elevated at 11.6.  I think this is part of the reason she is having urinary frequency and feeling unwell.  Please help coordinate an appointment with diabetic educator.  I will place referral in the chart.  Please clarify what GLP-1 product she has received for weight management.  This will also be a great tool for diabetes management.  I can attempt to prescribe for insurance coverage now that we have diagnosed her with diabetes.  Please help coordinate a med check/diabetes follow up visit with me in 1 month.   Damaris Son, DO

## 2024-08-17 LAB — BACTERIA UR CULT: ABNORMAL

## 2024-08-17 NOTE — RESULT ENCOUNTER NOTE
Patient updated by [employer] message with lab results.       The e coli infection is showing resistance to several antibiotics, though the Levaquin prescribed should treat this infection. I hope you start to feel better soon.  Damaris Son, DO

## 2024-08-17 NOTE — RESULT ENCOUNTER NOTE
Patient updated by [employer] message with lab results.       Dawit Farfan,  1. Your urine is growing E coli. The final culture is still in process. Continue treatment with Levaquin.   2. Urine drug screen as anticipated.  3. Metabolic panel consistent with elevated blood sugars, otherwise reassuring.   4. Cholesterol labs are elevated. Now with diabetes, we will recommend treatment with a statin (cholesterol lowering medication) to reduce your risk of heart disease. I will send a prescription for crestor to the pharmacy.  5. Your A1c is quite elevated, consistent with uncontrolled diabetes. I asked the team to reach out to you earlier today with an update, to help coordinate an appointment with diabetes education, and schedule a follow up appointment with me. I would start the GLP-1 medication you have been prescribed.   6. Thyroid normal.   Damaris Son, DO

## 2024-08-19 ENCOUNTER — TELEPHONE (OUTPATIENT)
Dept: FAMILY MEDICINE | Facility: CLINIC | Age: 49
End: 2024-08-19
Payer: COMMERCIAL

## 2024-08-19 NOTE — TELEPHONE ENCOUNTER
PRIOR AUTHORIZATION DENIED    Medication: FLUTICASONE PROPIONATE (INHAL) 100 MCG/ACT IN AEPB  Insurance Company: [employer] [place] - Phone [phone] Fax [phone]  Denial Date: 8/17/2024  Denial Reason(s): Diagnosis is not FDA Approved for this medication      Appeal Information:   Patient Notified: No

## 2024-08-19 NOTE — TELEPHONE ENCOUNTER
Called and spoke with patient, relayed results and PCP recommendations. Provided patient with diabetic ed scheduling number, she will reach out now. Scheduled for 1 month follow up on 9/13.     Patient reports being prescribed compounded semaglutide that she has not yet started. Discussed that PCP could send prescription over to pharmacy to check on insurance coverage with type 2 DM diagnosis. Patient agreeable, pharmacy pended.    Jeni Oscar RN

## 2024-08-21 ENCOUNTER — TRANSFERRED RECORDS (OUTPATIENT)
Dept: HEALTH INFORMATION MANAGEMENT | Facility: CLINIC | Age: 49
End: 2024-08-21
Payer: COMMERCIAL

## 2024-08-25 DIAGNOSIS — G47.00 INSOMNIA, UNSPECIFIED TYPE: ICD-10-CM

## 2024-08-25 DIAGNOSIS — Z79.899 CONTROLLED SUBSTANCE AGREEMENT SIGNED: Primary | ICD-10-CM

## 2024-08-25 DIAGNOSIS — G25.81 RESTLESS LEGS SYNDROME (RLS): ICD-10-CM

## 2024-08-25 NOTE — TELEPHONE ENCOUNTER
Can patient check with insurance plan to see if there is a covered formulary alternative? I do not see one listed in the denial.  Damaris Son, DO

## 2024-08-26 RX ORDER — ZOLPIDEM TARTRATE 5 MG/1
TABLET ORAL
Qty: 30 TABLET | Refills: 0 | Status: SHIPPED | OUTPATIENT
Start: 2024-08-26 | End: 2024-09-23

## 2024-08-26 RX ORDER — CLONAZEPAM 1 MG/1
1 TABLET ORAL AT BEDTIME
Qty: 30 TABLET | Refills: 0 | Status: SHIPPED | OUTPATIENT
Start: 2024-08-26 | End: 2024-09-23

## 2024-08-26 NOTE — TELEPHONE ENCOUNTER
1. Controlled substance agreement signed - 8/15/24  2. Insomnia, unspecified type  3. Restless legs syndrome (RLS)  - zolpidem (AMBIEN) 5 MG tablet; TAKE ONE TABLET BY MOUTH EVERY NIGHT AT BEDTIME AS NEEDED FOR SLEEP  Dispense: 30 tablet; Refill: 0  - clonazePAM (KLONOPIN) 1 MG tablet; Take 1 tablet (1 mg) by mouth at bedtime.  Dispense: 30 tablet; Refill: 0    [place]ed [place] . Due for refills. CSA up to date. Refill sent to pharmacy.    Damaris Son DO

## 2024-08-28 NOTE — TELEPHONE ENCOUNTER
Called and spoke with patient and . Asked that the information be sent via [employer] and they will look into it with insurance.

## 2024-09-06 ENCOUNTER — TRANSFERRED RECORDS (OUTPATIENT)
Dept: MULTI SPECIALTY CLINIC | Facility: CLINIC | Age: 49
End: 2024-09-06
Payer: COMMERCIAL

## 2024-09-06 LAB — RETINOPATHY: NORMAL

## 2024-09-13 ENCOUNTER — OFFICE VISIT (OUTPATIENT)
Dept: FAMILY MEDICINE | Facility: CLINIC | Age: 49
End: 2024-09-13
Payer: COMMERCIAL

## 2024-09-13 VITALS
RESPIRATION RATE: 24 BRPM | SYSTOLIC BLOOD PRESSURE: 123 MMHG | OXYGEN SATURATION: 95 % | DIASTOLIC BLOOD PRESSURE: 71 MMHG | HEIGHT: 66 IN | WEIGHT: 293 LBS | TEMPERATURE: 98.3 F | HEART RATE: 88 BPM | BODY MASS INDEX: 47.09 KG/M2

## 2024-09-13 DIAGNOSIS — Z23 IMMUNIZATION DUE: ICD-10-CM

## 2024-09-13 DIAGNOSIS — E11.65 TYPE 2 DIABETES MELLITUS WITH HYPERGLYCEMIA, WITHOUT LONG-TERM CURRENT USE OF INSULIN (H): Primary | ICD-10-CM

## 2024-09-13 PROCEDURE — 99214 OFFICE O/P EST MOD 30 MIN: CPT | Mod: 25 | Performed by: FAMILY MEDICINE

## 2024-09-13 PROCEDURE — 90471 IMMUNIZATION ADMIN: CPT | Performed by: FAMILY MEDICINE

## 2024-09-13 PROCEDURE — 90677 PCV20 VACCINE IM: CPT | Performed by: FAMILY MEDICINE

## 2024-09-13 PROCEDURE — 82570 ASSAY OF URINE CREATININE: CPT | Performed by: FAMILY MEDICINE

## 2024-09-13 PROCEDURE — 90656 IIV3 VACC NO PRSV 0.5 ML IM: CPT | Performed by: FAMILY MEDICINE

## 2024-09-13 PROCEDURE — 90472 IMMUNIZATION ADMIN EACH ADD: CPT | Performed by: FAMILY MEDICINE

## 2024-09-13 PROCEDURE — 82043 UR ALBUMIN QUANTITATIVE: CPT | Performed by: FAMILY MEDICINE

## 2024-09-13 RX ORDER — KETOROLAC TROMETHAMINE 30 MG/ML
1 INJECTION, SOLUTION INTRAMUSCULAR; INTRAVENOUS ONCE
Qty: 1 EACH | Refills: 0 | Status: SHIPPED | OUTPATIENT
Start: 2024-09-13 | End: 2024-09-13

## 2024-09-13 RX ORDER — BLOOD-GLUCOSE SENSOR
EACH MISCELLANEOUS
Qty: 6 EACH | Refills: 3 | Status: SHIPPED | OUTPATIENT
Start: 2024-09-13

## 2024-09-13 NOTE — PROGRESS NOTES
Assessment & Plan     1. Type 2 diabetes mellitus with hyperglycemia, without long-term current use of insulin (H)  - Albumin Random Urine Quantitative with Creat Ratio  - Hemoglobin A1c; Future  - Continuous Glucose  (FREESTYLE POWER 3 READER) FLOR; 1 each once for 1 dose. Use to read blood sugars per 's instructions.  Dispense: 1 each; Refill: 0  - Continuous Glucose Sensor (FREESTYLE POWER 3 PLUS SENSOR) MISC; Use 1 sensor every 15 days. Use to read blood sugars per 's instructions.  Dispense: 6 each; Refill: 3    Spent our visit today reviewing diagnosis of diabetes and treatment strategies.  Intolerant to metformin.  Currently titrating up Ozempic.  Request she reach out before due for refill for blood sugar readings, tolerance of medications, status of weight loss, and desire to either continue dose or titrate upward.  Reviewed blood sugar monitoring.  Interested in trial of CGM.  If this is not covered by insurance, we will discuss glucometer.  Up-to-date diabetic eye exam.  Currently taking statin medication.  Normal diabetic foot exam today.  Update urine microalbumin testing.    Plan for lab only A1c on November 15.    2. Immunization due  - Pneumococcal 20 Valent Conjugate (Prevnar 20)  - INFLUENZA VACCINE,SPLIT VIRUS,TRIVALENT,PF(FLUZONE)        The longitudinal plan of care for the diagnosis(es)/condition(s) as documented were addressed during this visit. Due to the added complexity in care, I will continue to support Naheed in the subsequent management and with ongoing continuity of care.       Subjective   Naheed is a 48 year old, presenting for the following health issues:  Follow Up        9/13/2024     3:27 PM   Additional Questions   Roomed by Malinda ALVARADO CMA   Accompanied by spouse     Via the Health Maintenance questionnaire, the patient has reported the following services have been completed -Eye Exam: [facility] [place], [place] 2024-09-06, this information has been sent  "to the abstraction team.  History of Present Illness       Diabetes:   She presents for follow up of diabetes.    She is not checking blood glucose.         She has no concerns regarding her diabetes at this time.  She is having blurry vision.  The patient has had a diabetic eye exam in the last 12 months. Eye exam performed on 9/6/2024. [place]        She eats 2-3 servings of fruits and vegetables daily.She consumes 0 sweetened beverage(s) daily.She exercises with enough effort to increase her heart rate 10 to 19 minutes per day.  She exercises with enough effort to increase her heart rate 4 days per week.   She is taking medications regularly.       DIABETES:   Hemoglobin A1C   Date Value Ref Range Status   08/15/2024 11.6 (H) 0.0 - 5.6 % Final     Comment:     Normal <5.7%   Prediabetes 5.7-6.4%    Diabetes 6.5% or higher     Note: Adopted from ADA consensus guidelines.   08/25/2009 5.7 4.3 - 6.0 % Final         Semaglutide: should be on 0.5mg, delayed injections by a few weeks with wedding reception. Still 0.25 weekly, feeling okay. Intermittent nausea, no other major side effects.     Metformin: didn't previously tolerate - nausea/diarrhea.      Crestor 10mg daily.     9/6/24 - eye appt  9/19 - diabetic ed appt        Review of Systems  See HPI above.         Objective    /71 (BP Location: Left arm, Patient Position: Sitting, Cuff Size: Adult Large)   Pulse 88   Temp 98.3  F (36.8  C) (Oral)   Resp 24   Ht 1.676 m (5' 6\")   Wt 147.3 kg (324 lb 12.8 oz)   SpO2 95%   BMI 52.42 kg/m    Body mass index is 52.42 kg/m .  Physical Exam   GENERAL: alert and no distress  NECK: no adenopathy, no asymmetry, masses, or scars  RESP: lungs clear to auscultation - no rales, rhonchi or wheezes  CV: regular rate and rhythm, normal S1 S2, no S3 or S4, no murmur, click or rub, no peripheral edema  ABDOMEN: soft, nontender, no hepatosplenomegaly, no masses and bowel " sounds normal  MS: no gross musculoskeletal defects noted, no edema    Diabetic foot exam: normal DP and PT pulses, no trophic changes or ulcerative lesions, normal sensory exam, and normal monofilament exam          Signed Electronically by: Damaris Son DO

## 2024-09-16 LAB
CREAT UR-MCNC: 257 MG/DL
MICROALBUMIN UR-MCNC: 52.7 MG/L
MICROALBUMIN/CREAT UR: 20.51 MG/G CR (ref 0–25)

## 2024-09-16 NOTE — RESULT ENCOUNTER NOTE
Patient updated by [employer] message with lab results.       Dawit Farfan,  Thank you for completing the urine test for protein. This has returned in the normal range.  Damaris Son, DO

## 2024-09-17 ENCOUNTER — TRANSFERRED RECORDS (OUTPATIENT)
Dept: HEALTH INFORMATION MANAGEMENT | Facility: CLINIC | Age: 49
End: 2024-09-17
Payer: COMMERCIAL

## 2024-09-22 DIAGNOSIS — G47.00 INSOMNIA, UNSPECIFIED TYPE: ICD-10-CM

## 2024-09-22 DIAGNOSIS — G25.81 RESTLESS LEGS SYNDROME (RLS): ICD-10-CM

## 2024-09-22 DIAGNOSIS — Z79.899 CONTROLLED SUBSTANCE AGREEMENT SIGNED: ICD-10-CM

## 2024-09-23 RX ORDER — CLONAZEPAM 1 MG/1
1 TABLET ORAL AT BEDTIME
Qty: 30 TABLET | Refills: 0 | Status: SHIPPED | OUTPATIENT
Start: 2024-09-25

## 2024-09-23 RX ORDER — ZOLPIDEM TARTRATE 5 MG/1
TABLET ORAL
Qty: 30 TABLET | Refills: 0 | Status: SHIPPED | OUTPATIENT
Start: 2024-09-25

## 2024-09-23 NOTE — TELEPHONE ENCOUNTER
1. Controlled substance agreement signed - 8/15/24  2. Insomnia, unspecified type  3. Restless legs syndrome (RLS)  - clonazePAM (KLONOPIN) 1 MG tablet; Take 1 tablet (1 mg) by mouth at bedtime.  Dispense: 30 tablet; Refill: 0  - zolpidem (AMBIEN) 5 MG tablet; TAKE ONE TABLET BY MOUTH EVERY NIGHT AT BEDTIME AS NEEDED FOR SLEEP  Dispense: 30 tablet; Refill: 0    [place] . Due for refills. CSA up to date. Refill sent to pharmacy.    Damaris Son DO

## 2024-10-26 DIAGNOSIS — G47.00 INSOMNIA, UNSPECIFIED TYPE: ICD-10-CM

## 2024-10-26 DIAGNOSIS — Z79.899 CONTROLLED SUBSTANCE AGREEMENT SIGNED: ICD-10-CM

## 2024-10-26 DIAGNOSIS — G25.81 RESTLESS LEGS SYNDROME (RLS): ICD-10-CM

## 2024-10-28 RX ORDER — CLONAZEPAM 1 MG/1
1 TABLET ORAL AT BEDTIME
Qty: 30 TABLET | Refills: 0 | Status: SHIPPED | OUTPATIENT
Start: 2024-11-02

## 2024-10-28 RX ORDER — ZOLPIDEM TARTRATE 5 MG/1
TABLET ORAL
Qty: 30 TABLET | Refills: 0 | Status: SHIPPED | OUTPATIENT
Start: 2024-11-02

## 2024-10-28 NOTE — TELEPHONE ENCOUNTER
Controlled substance agreement signed - 8/15/24  Insomnia, unspecified type  Restless legs syndrome (RLS)  - clonazePAM (KLONOPIN) 1 MG tablet; Take 1 tablet (1 mg) by mouth at bedtime.  Dispense: 30 tablet; Refill: 0  - zolpidem (AMBIEN) 5 MG tablet; TAKE ONE TABLET BY MOUTH EVERY NIGHT AT BEDTIME AS NEEDED FOR SLEEP  Dispense: 30 tablet; Refill: 0    [place]ed [place] . Coming due for refill. CSA is up to date. Next appointment scheduled.   Refills sent to pharmacy.    Damaris Son DO

## 2024-10-31 ENCOUNTER — E-VISIT (OUTPATIENT)
Dept: FAMILY MEDICINE | Facility: CLINIC | Age: 49
End: 2024-10-31
Payer: COMMERCIAL

## 2024-10-31 DIAGNOSIS — R30.0 DYSURIA: Primary | ICD-10-CM

## 2024-10-31 PROCEDURE — 99421 OL DIG E/M SVC 5-10 MIN: CPT | Performed by: FAMILY MEDICINE

## 2024-10-31 NOTE — PATIENT INSTRUCTIONS
Dear Estephania Wong,     After reviewing your responses, I would like you to come in for a urine test to make sure we treat you correctly. This urine test is to evaluate you for a possible urinary tract infection, and should be scheduled for today or tomorrow. Schedule a Lab Only appointment here.     Lab appointments are not available at most locations on the weekends. If no Lab Only appointment is available, you should be seen in any of our convenient Walk-in or Urgent Care Centers, which can be found on our website here.     You will receive instructions with your results in [facility] once they are available.     If your symptoms worsen, you develop pain in your back or stomach, develop fevers, or are not improving in 5 days, please contact your primary care provider for an appointment or visit a Walk-in or Urgent Care Center to be seen.     Thanks again for choosing us as your health care partner,     Damaris Son, DO

## 2024-11-14 ENCOUNTER — TRANSFERRED RECORDS (OUTPATIENT)
Dept: HEALTH INFORMATION MANAGEMENT | Facility: CLINIC | Age: 49
End: 2024-11-14
Payer: COMMERCIAL

## 2024-11-24 DIAGNOSIS — G47.00 INSOMNIA, UNSPECIFIED TYPE: ICD-10-CM

## 2024-11-24 DIAGNOSIS — G25.81 RESTLESS LEGS SYNDROME (RLS): ICD-10-CM

## 2024-11-24 DIAGNOSIS — Z79.899 CONTROLLED SUBSTANCE AGREEMENT SIGNED: ICD-10-CM

## 2024-11-25 RX ORDER — ZOLPIDEM TARTRATE 5 MG/1
TABLET ORAL
Qty: 30 TABLET | Refills: 0 | Status: SHIPPED | OUTPATIENT
Start: 2024-11-30

## 2024-11-25 RX ORDER — CLONAZEPAM 1 MG/1
TABLET ORAL
Qty: 30 TABLET | Refills: 0 | Status: SHIPPED | OUTPATIENT
Start: 2024-11-30

## 2024-11-25 NOTE — TELEPHONE ENCOUNTER
1. Controlled substance agreement signed - 8/15/24  2. Insomnia, unspecified type  3. Restless legs syndrome (RLS)  - clonazePAM (KLONOPIN) 1 MG tablet; TAKE ONE TABLET BY MOUTH EVERY EVENING AT BEDTIME  Dispense: 30 tablet; Refill: 0  - zolpidem (AMBIEN) 5 MG tablet; TAKE ONE TABLET BY MOUTH EVERY NIGHT AT BEDTIME AS NEEDED FOR SLEEP  Dispense: 30 tablet; Refill: 0    [place] . Coming due for [place]ill. CSA up to date. Next med check scheduled.   Refills sent to pharmacy.    Damaris Son DO

## 2024-12-12 ENCOUNTER — ANCILLARY PROCEDURE (OUTPATIENT)
Dept: MAMMOGRAPHY | Facility: CLINIC | Age: 49
End: 2024-12-12
Attending: FAMILY MEDICINE
Payer: COMMERCIAL

## 2024-12-12 DIAGNOSIS — Z12.31 VISIT FOR SCREENING MAMMOGRAM: ICD-10-CM

## 2024-12-12 PROCEDURE — 77063 BREAST TOMOSYNTHESIS BI: CPT

## 2024-12-12 PROCEDURE — 77067 SCR MAMMO BI INCL CAD: CPT

## 2024-12-24 ENCOUNTER — TELEPHONE (OUTPATIENT)
Dept: FAMILY MEDICINE | Facility: CLINIC | Age: 49
End: 2024-12-24
Payer: COMMERCIAL

## 2024-12-24 NOTE — TELEPHONE ENCOUNTER
FYI - Status Update    Who is Calling: patient    Update: patient called to schedule on 12/24/24 for her med refills, I could not get her in the office till 2/4/25 she was worried about her med refills in the mean time    Does caller want a call/response back: Yes     Could we send this information to you in [facility] or would you prefer to receive a phone call?:   Patient would prefer a phone call   Okay to leave a detailed message?: Yes at Cell number on file:    Telephone Information:   Mobile [phone]

## 2024-12-26 NOTE — TELEPHONE ENCOUNTER
Patient doesn't need refills right now. She will let us know if she does before her upcoming appt.

## 2024-12-29 ENCOUNTER — HEALTH MAINTENANCE LETTER (OUTPATIENT)
Age: 49
End: 2024-12-29

## 2025-01-09 DIAGNOSIS — M54.50 LOW BACK PAIN, UNSPECIFIED BACK PAIN LATERALITY, UNSPECIFIED CHRONICITY, UNSPECIFIED WHETHER SCIATICA PRESENT: ICD-10-CM

## 2025-01-09 DIAGNOSIS — M25.569 KNEE PAIN, UNSPECIFIED CHRONICITY, UNSPECIFIED LATERALITY: ICD-10-CM

## 2025-01-09 RX ORDER — CELECOXIB 100 MG/1
100 CAPSULE ORAL 2 TIMES DAILY
Qty: 60 CAPSULE | Refills: 0 | Status: SHIPPED | OUTPATIENT
Start: 2025-01-09

## 2025-01-09 NOTE — TELEPHONE ENCOUNTER
1. Knee pain, unspecified chronicity, unspecified laterality  2. Low back pain, unspecified back pain laterality, unspecified chronicity, unspecified whether sciatica present  - celecoxib (CELEBREX) 100 MG capsule; Take 1 capsule by mouth twice daily.  Dispense: 60 capsule; Refill: 0     Bridging refill sent to pharmacy.  Need to recheck labs.    Damaris Son, DO

## 2025-01-17 ENCOUNTER — MYC MEDICAL ADVICE (OUTPATIENT)
Dept: FAMILY MEDICINE | Facility: CLINIC | Age: 50
End: 2025-01-17
Payer: COMMERCIAL

## 2025-01-17 DIAGNOSIS — Z79.899 CONTROLLED SUBSTANCE AGREEMENT SIGNED: ICD-10-CM

## 2025-01-17 DIAGNOSIS — G47.00 INSOMNIA, UNSPECIFIED TYPE: ICD-10-CM

## 2025-01-17 DIAGNOSIS — G25.81 RESTLESS LEGS SYNDROME (RLS): ICD-10-CM

## 2025-01-20 RX ORDER — ZOLPIDEM TARTRATE 5 MG/1
TABLET ORAL
Qty: 30 TABLET | Refills: 0 | Status: SHIPPED | OUTPATIENT
Start: 2025-02-05

## 2025-01-20 RX ORDER — CLONAZEPAM 1 MG/1
TABLET ORAL
Qty: 30 TABLET | Refills: 0 | Status: SHIPPED | OUTPATIENT
Start: 2025-02-05

## 2025-01-20 NOTE — TELEPHONE ENCOUNTER
1. Controlled substance agreement signed - 8/15/24  2. Insomnia, unspecified type  3. Restless legs syndrome (RLS)  - zolpidem (AMBIEN) 5 MG tablet; TAKE ONE TABLET BY MOUTH EVERY NIGHT AT BEDTIME AS NEEDED FOR SLEEP  Dispense: 30 tablet; Refill: 0  - clonazePAM (KLONOPIN) 1 MG tablet; TAKE ONE TABLET BY MOUTH EVERY EVENING AT BEDTIME  Dispense: 30 tablet; Refill: 0    [place] . Not yet due for refills. Prescriptions prepped for 2/5/25.     Damaris Son, DO

## 2025-01-26 DIAGNOSIS — Z79.899 CONTROLLED SUBSTANCE AGREEMENT SIGNED: ICD-10-CM

## 2025-01-26 DIAGNOSIS — G47.00 INSOMNIA, UNSPECIFIED TYPE: ICD-10-CM

## 2025-01-27 ENCOUNTER — TELEPHONE (OUTPATIENT)
Dept: FAMILY MEDICINE | Facility: CLINIC | Age: 50
End: 2025-01-27
Payer: COMMERCIAL

## 2025-01-27 RX ORDER — ZOLPIDEM TARTRATE 5 MG/1
TABLET ORAL
Qty: 30 TABLET | OUTPATIENT
Start: 2025-01-27

## 2025-01-27 NOTE — TELEPHONE ENCOUNTER
"Called and spoke with pt, pt explained with [facility] prescription it showed up as \"out of refills\", pt was unsure if she would get next month's refills on time or not hence the refill order. She wasn't ordering for more she was setting up for the next month.   "

## 2025-01-27 NOTE — TELEPHONE ENCOUNTER
Please see Dr. Son's note:    This prescription was just filled 1/22 and was filled prematurely by review of [place] . Can you check in with patient to see why this is being requested again already?   Damaris Son, DO         Called PT and she said she wasn't requesting more, she was unsure of refill process with medication and wanted to make sure she would get next month's refill on time

## 2025-01-28 RX ORDER — ZOLPIDEM TARTRATE 5 MG/1
TABLET ORAL
Qty: 30 TABLET | Refills: 0 | Status: SHIPPED | OUTPATIENT
Start: 2025-03-06

## 2025-01-28 NOTE — TELEPHONE ENCOUNTER
1. Controlled substance agreement signed - 8/15/24  2. Insomnia, unspecified type  - zolpidem (AMBIEN) 5 MG tablet; TAKE ONE TABLET BY MOUTH EVERY NIGHT AT BEDTIME AS NEEDED FOR SLEEP  Dispense: 30 tablet; Refill: 0     We have been filling her controlled substances monthly.  We will continue to refill controlled substances monthly as outlined by the controlled substance agreement policy.  As her last prescription was filled 2 weeks early, this 1 will be set to fill early March.  Prescription sent to the pharmacy for future refill.    Damaris Son, DO

## 2025-02-01 DIAGNOSIS — M25.569 KNEE PAIN, UNSPECIFIED CHRONICITY, UNSPECIFIED LATERALITY: ICD-10-CM

## 2025-02-01 DIAGNOSIS — M54.50 LOW BACK PAIN, UNSPECIFIED BACK PAIN LATERALITY, UNSPECIFIED CHRONICITY, UNSPECIFIED WHETHER SCIATICA PRESENT: ICD-10-CM

## 2025-02-04 ENCOUNTER — OFFICE VISIT (OUTPATIENT)
Dept: FAMILY MEDICINE | Facility: CLINIC | Age: 50
End: 2025-02-04
Payer: COMMERCIAL

## 2025-02-04 VITALS
TEMPERATURE: 99 F | OXYGEN SATURATION: 97 % | BODY MASS INDEX: 47.09 KG/M2 | DIASTOLIC BLOOD PRESSURE: 76 MMHG | WEIGHT: 293 LBS | RESPIRATION RATE: 14 BRPM | HEIGHT: 66 IN | SYSTOLIC BLOOD PRESSURE: 127 MMHG | HEART RATE: 76 BPM

## 2025-02-04 DIAGNOSIS — E66.01 MORBID OBESITY (H): ICD-10-CM

## 2025-02-04 DIAGNOSIS — Z79.899 CONTROLLED SUBSTANCE AGREEMENT SIGNED: ICD-10-CM

## 2025-02-04 DIAGNOSIS — G47.00 INSOMNIA, UNSPECIFIED TYPE: ICD-10-CM

## 2025-02-04 DIAGNOSIS — M54.50 LOW BACK PAIN, UNSPECIFIED BACK PAIN LATERALITY, UNSPECIFIED CHRONICITY, UNSPECIFIED WHETHER SCIATICA PRESENT: ICD-10-CM

## 2025-02-04 DIAGNOSIS — E11.65 TYPE 2 DIABETES MELLITUS WITH HYPERGLYCEMIA, WITHOUT LONG-TERM CURRENT USE OF INSULIN (H): Primary | ICD-10-CM

## 2025-02-04 DIAGNOSIS — G25.81 RESTLESS LEGS SYNDROME (RLS): ICD-10-CM

## 2025-02-04 DIAGNOSIS — R30.0 DYSURIA: ICD-10-CM

## 2025-02-04 DIAGNOSIS — M25.569 KNEE PAIN, UNSPECIFIED CHRONICITY, UNSPECIFIED LATERALITY: ICD-10-CM

## 2025-02-04 LAB
ALBUMIN UR-MCNC: NEGATIVE MG/DL
APPEARANCE UR: CLEAR
BACTERIA #/AREA URNS HPF: ABNORMAL /HPF
BILIRUB UR QL STRIP: NEGATIVE
COLOR UR AUTO: YELLOW
EST. AVERAGE GLUCOSE BLD GHB EST-MCNC: 189 MG/DL
GLUCOSE UR STRIP-MCNC: NEGATIVE MG/DL
HBA1C MFR BLD: 8.2 % (ref 0–5.6)
HGB UR QL STRIP: ABNORMAL
HOLD SPECIMEN: NORMAL
KETONES UR STRIP-MCNC: NEGATIVE MG/DL
LEUKOCYTE ESTERASE UR QL STRIP: ABNORMAL
NITRATE UR QL: NEGATIVE
PH UR STRIP: 5.5 [PH] (ref 5–8)
RBC #/AREA URNS AUTO: ABNORMAL /HPF
SP GR UR STRIP: 1.02 (ref 1–1.03)
SQUAMOUS #/AREA URNS AUTO: ABNORMAL /LPF
UROBILINOGEN UR STRIP-ACNC: 0.2 E.U./DL
WBC #/AREA URNS AUTO: ABNORMAL /HPF

## 2025-02-04 PROCEDURE — 36415 COLL VENOUS BLD VENIPUNCTURE: CPT | Performed by: FAMILY MEDICINE

## 2025-02-04 PROCEDURE — 81001 URINALYSIS AUTO W/SCOPE: CPT | Performed by: FAMILY MEDICINE

## 2025-02-04 PROCEDURE — 83036 HEMOGLOBIN GLYCOSYLATED A1C: CPT | Performed by: FAMILY MEDICINE

## 2025-02-04 PROCEDURE — 80048 BASIC METABOLIC PNL TOTAL CA: CPT | Performed by: FAMILY MEDICINE

## 2025-02-04 PROCEDURE — G2211 COMPLEX E/M VISIT ADD ON: HCPCS | Performed by: FAMILY MEDICINE

## 2025-02-04 PROCEDURE — 99214 OFFICE O/P EST MOD 30 MIN: CPT | Performed by: FAMILY MEDICINE

## 2025-02-04 RX ORDER — CELECOXIB 100 MG/1
100 CAPSULE ORAL 2 TIMES DAILY
Qty: 180 CAPSULE | Refills: 1 | Status: SHIPPED | OUTPATIENT
Start: 2025-02-04

## 2025-02-04 RX ORDER — CELECOXIB 100 MG/1
100 CAPSULE ORAL 2 TIMES DAILY
Qty: 60 CAPSULE | Refills: 0 | OUTPATIENT
Start: 2025-02-04

## 2025-02-04 RX ORDER — CLONAZEPAM 1 MG/1
1 TABLET ORAL
Qty: 7 TABLET | Refills: 0 | Status: SHIPPED | OUTPATIENT
Start: 2025-02-04 | End: 2025-02-11

## 2025-02-04 NOTE — PROGRESS NOTES
Assessment & Plan     1. Type 2 diabetes mellitus with hyperglycemia, without long-term current use of insulin (H) (Primary)  2. Morbid obesity (H)  - Hemoglobin A1c  - semaglutide (OZEMPIC) 2 MG/3ML pen; Inject 0.25 mg subcutaneously every 7 days.  Dispense: 3 mL; Refill: 6  - Basic metabolic panel  (Ca, Cl, CO2, Creat, Gluc, K, Na, BUN)    Due for surveillance labs. Didn't tolerate dose increase with Ozempic. Continue current dose. Update monitoring labs. Will discuss options pending A1c result.     GLP1 also helping with weight management, limited due to intolerance to higher dose.       - clonazePAM (KLONOPIN) 1 MG tablet; Take 1 tablet (1 mg) by mouth nightly as needed for anxiety.  Dispense: 7 tablet; Refill: 0    3. Knee pain, unspecified chronicity, unspecified laterality  4. Low back pain, unspecified back pain laterality, unspecified chronicity, unspecified whether sciatica present  - celecoxib (CELEBREX) 100 MG capsule; Take 1 capsule (100 mg) by mouth 2 times daily.  Dispense: 180 capsule; Refill: 1    Due for refill, due for medication monitoring labs. Continue Celebrex.     5. Controlled substance agreement signed - 8/15/24  6. Insomnia, unspecified type  7. Restless legs syndrome (RLS)  - clonazePAM (KLONOPIN) 1 MG tablet; Take 1 tablet (1 mg) by mouth nightly as needed for anxiety.  Dispense: 7 tablet; Refill: 0    Transferring prescriptions to mail order pharmacy, needs bridging prescription due to mail time.   Continues to help with sleep and RLS. Dose has been stable for a long time.     8. Dysuria  - UA Macroscopic with reflex to Microscopic and Culture - Lab Collect  - UA Microscopic with Reflex to Culture      Reports symptoms of dysuria, evaluate with UA.       The longitudinal plan of care for the diagnosis(es)/condition(s) as documented were addressed during this visit. Due to the added complexity in care, I will continue to support Naheed in the subsequent management and with ongoing  continuity of care.       Adolph Farfan is a 49 year old, presenting for the following health issues:  Recheck Medication      2/4/2025    12:36 PM   Additional Questions   Roomed by Rodri MARSHALL MA     History of Present Illness       Diabetes:   She presents for follow up of diabetes.   She is checking home blood glucose with a continuous glucose monitor.   She checks blood glucose before and after meals and at bedtime.  Blood glucose is sometimes over 200 and sometimes under 70.  When her blood glucose is low, the patient is asymptomatic for confusion, blurred vision, lethargy and reports not feeling dizzy, shaky, or weak.   She has no concerns regarding her diabetes at this time.   She is not experiencing numbness or burning in feet, excessive thirst, blurry vision, weight changes or redness, sores or blisters on feet.           She eats 2-3 servings of fruits and vegetables daily.She consumes 0 sweetened beverage(s) daily.She exercises with enough effort to increase her heart rate 9 or less minutes per day.  She exercises with enough effort to increase her heart rate 6 days per week.   She is taking medications regularly.       Type 2 diabetes mellitus with hyperglycemia, without long-term current use of insulin (H) (Primary)  Hemoglobin A1C   Date Value Ref Range Status   08/15/2024 11.6 (H) 0.0 - 5.6 % Final     Comment:     Normal <5.7%   Prediabetes 5.7-6.4%    Diabetes 6.5% or higher     Note: Adopted from ADA consensus guidelines.   08/25/2009 5.7 4.3 - 6.0 % Final     Currently taking Ozempic 0.25 mg weekly. Previously intolerant to metformin.  Has been checking her sugars, are improving into range.   Didn't tolerate the ozempic 0.5mg dose due to diarrhea, doing well on the 0.25mg dose.         Controlled substance agreement signed - 8/15/24  Continues on clonazepam and Ambien       Weight management  Body mass index is 52.13 kg/m .  Wt Readings from Last 4 Encounters:   02/04/25 (!) 146.5 kg (323 lb)  "  09/13/24 147.3 kg (324 lb 12.8 oz)   08/15/24 147.1 kg (324 lb 3.2 oz)   07/25/24 149.7 kg (330 lb)     Continuing to work on healthy habits, GLP-1 side effects limiting dose titration.       Dysuria  Burning with urination, trying cranberry, symptoms worsening.       Review of Systems  See HPI above.         Objective    /76   Pulse 76   Temp 99  F (37.2  C) (Oral)   Resp 14   Ht 1.676 m (5' 6\")   Wt (!) 146.5 kg (323 lb)   SpO2 97%   BMI 52.13 kg/m    Body mass index is 52.13 kg/m .  Physical Exam   GENERAL: alert and no distress  NECK: no adenopathy, no asymmetry, masses, or scars  RESP: lungs clear to auscultation - no rales, rhonchi or wheezes  CV: regular rate and rhythm, normal S1 S2, no S3 or S4, no murmur, click or rub, no peripheral edema  ABDOMEN: soft, nontender, no hepatosplenomegaly, no masses and bowel sounds normal  MS: no gross musculoskeletal defects noted, no edema          Signed Electronically by: Damaris Son, DO    "

## 2025-02-05 ENCOUNTER — TELEPHONE (OUTPATIENT)
Dept: FAMILY MEDICINE | Facility: CLINIC | Age: 50
End: 2025-02-05

## 2025-02-05 ENCOUNTER — MYC MEDICAL ADVICE (OUTPATIENT)
Dept: FAMILY MEDICINE | Facility: CLINIC | Age: 50
End: 2025-02-05
Payer: COMMERCIAL

## 2025-02-05 LAB
ANION GAP SERPL CALCULATED.3IONS-SCNC: 13 MMOL/L (ref 7–15)
BUN SERPL-MCNC: 16.4 MG/DL (ref 6–20)
CALCIUM SERPL-MCNC: 9.7 MG/DL (ref 8.8–10.4)
CHLORIDE SERPL-SCNC: 101 MMOL/L (ref 98–107)
CREAT SERPL-MCNC: 1.16 MG/DL (ref 0.51–0.95)
EGFRCR SERPLBLD CKD-EPI 2021: 58 ML/MIN/1.73M2
GLUCOSE SERPL-MCNC: 149 MG/DL (ref 70–99)
HCO3 SERPL-SCNC: 25 MMOL/L (ref 22–29)
POTASSIUM SERPL-SCNC: 4.4 MMOL/L (ref 3.4–5.3)
SODIUM SERPL-SCNC: 139 MMOL/L (ref 135–145)

## 2025-02-05 NOTE — RESULT ENCOUNTER NOTE
Patient updated by Zayo message with lab results.       Dawit Farfan,  Your labs have returned.  Overall labs look good.  Your blood sugar was mildly elevated consistent with your diabetes.  A1c is much improved from last check.  We will continue working on medication management.  We were waiting to make any other adjustments until we confirm whether or not your current prescription for Ozempic is still covered by insurance.  Please send me an update.  If readings continue to be above goal, let me know.  We can discuss other options for medication adjustment.  Your kidney function was slightly worse from last check.  We will continue to monitor this.  Again, lets continue working on good blood sugar control to help protect her kidney function.  Your urine did not meet criteria for culture.  It looks slightly contaminated with skin cells.  Let me know if symptoms are worsening and we can reevaluate.  Please reach out by Zayo with any follow-up questions or concerns.  Damaris Son, DO

## 2025-02-06 ENCOUNTER — TELEPHONE (OUTPATIENT)
Dept: FAMILY MEDICINE | Facility: CLINIC | Age: 50
End: 2025-02-06
Payer: COMMERCIAL

## 2025-02-06 DIAGNOSIS — E11.65 TYPE 2 DIABETES MELLITUS WITH HYPERGLYCEMIA, WITHOUT LONG-TERM CURRENT USE OF INSULIN (H): Primary | ICD-10-CM

## 2025-02-06 DIAGNOSIS — E66.01 MORBID OBESITY (H): ICD-10-CM

## 2025-02-06 NOTE — TELEPHONE ENCOUNTER
Please begin prior authorization for semaglutide (ozempic) 2mg/3ml pen     Clemencia Mclaughlin RN

## 2025-02-07 ENCOUNTER — TRANSFERRED RECORDS (OUTPATIENT)
Dept: HEALTH INFORMATION MANAGEMENT | Facility: CLINIC | Age: 50
End: 2025-02-07

## 2025-02-07 PROBLEM — L68.0 HIRSUTISM: Status: ACTIVE | Noted: 2025-02-07

## 2025-02-07 PROBLEM — Z30.431 ENCOUNTER FOR ROUTINE CHECKING OF INTRAUTERINE CONTRACEPTIVE DEVICE (IUD): Status: ACTIVE | Noted: 2025-02-07

## 2025-02-07 PROBLEM — G43.909 MIGRAINES: Status: ACTIVE | Noted: 2018-10-01

## 2025-02-10 DIAGNOSIS — E66.01 MORBID OBESITY (H): ICD-10-CM

## 2025-02-10 DIAGNOSIS — E11.65 TYPE 2 DIABETES MELLITUS WITH HYPERGLYCEMIA, WITHOUT LONG-TERM CURRENT USE OF INSULIN (H): ICD-10-CM

## 2025-02-10 NOTE — TELEPHONE ENCOUNTER
PRIOR AUTHORIZATION DENIED    Medication: OZEMPIC (0.25 OR 0.5 MG/DOSE) 2 MG/3ML SC SOPN  Insurance Company: [employer] - Phone [phone] Fax [phone]  Denial Date: 2/8/2025  Denial Reason(s): Must try Trulicity first      Appeal Information:     PA # [id number]    Patient Notified: No

## 2025-02-11 NOTE — TELEPHONE ENCOUNTER
Please reach out to pharmacy.    My preference would be ozempic, though I received notification of a denied prior authorization stating she needs to try and fail Trulicity first. This is why I sent in Trulicity to replace Ozempic.  Did I receive an error in the PA denial?     Damaris Son, DO

## 2025-02-11 NOTE — TELEPHONE ENCOUNTER
PRIOR AUTHORIZATION DENIED    Medication: OZEMPIC (0.25 OR 0.5 MG/DOSE) 2 MG/1.5ML SC SOPN  Insurance Company: [employer] Non-Specialty PA's - Phone [phone] Fax [phone]  Denial Date: 2/8/2025  Denial Reason(s): Pt must try fail Trulicity      Appeal Information:       Patient Notified: No

## 2025-02-11 NOTE — TELEPHONE ENCOUNTER
Type 2 diabetes mellitus with hyperglycemia, without long-term current use of insulin (H) (Primary)  Morbid obesity (H)  - dulaglutide (TRULICITY) 0.75 MG/0.5ML pen; Inject 0.75 mg subcutaneously every 7 days for 28 days.  Dispense: 2 mL; Refill: 0     Per denied PA, will trial Trulicity.    Damaris Son, DO

## 2025-02-14 NOTE — TELEPHONE ENCOUNTER
Unable to speak to a person, so I left a message for pharmacy to return call to clinic regarding patient.

## 2025-02-17 ENCOUNTER — TELEPHONE (OUTPATIENT)
Dept: FAMILY MEDICINE | Facility: CLINIC | Age: 50
End: 2025-02-17
Payer: COMMERCIAL

## 2025-02-17 RX ORDER — DULAGLUTIDE 0.75 MG/.5ML
INJECTION, SOLUTION SUBCUTANEOUS
Qty: 2 ML | Refills: 0 | OUTPATIENT
Start: 2025-02-17

## 2025-02-17 NOTE — TELEPHONE ENCOUNTER
Pharmacy called asking if the patient should be using Trulicity or Ozempic. Message relayed that patient needs to try Trulicity first, ozempic is not covered by insurance. Pharmacy will dispense the Trulicity. Med is already on file, canceling pended med.     Cristhian Fried RN

## 2025-02-17 NOTE — TELEPHONE ENCOUNTER
Please initiate PA.   dulaglutide (TRULICITY) 0.75 MG/0.5ML pen 2 mL 0 2/10/2025 3/10/2025 --   Sig - Route: Inject 0.75 mg subcutaneously every 7 days for 28 days. - Subcutaneous   Sent to pharmacy as: Dulaglutide 0.75 MG/0.5ML Subcutaneous Solution Auto-injector (TRULICITY)   Class: E-Prescribe   Order: [id number]   E-Prescribing Status: Receipt confirmed by pharmacy (2/10/2025  8:05 PM CST)     Type 2 diabetes mellitus with hyperglycemia, without long-term current use of insulin (H) [E11.65]  - Primary      Morbid obesity (H) [E66.01]

## 2025-02-19 NOTE — TELEPHONE ENCOUNTER
Prior Authorization Approval    Medication: TRULICITY 0.75 MG/0.5ML SC SOAJ  Authorization Effective Date: 1/20/2025  Authorization Expiration Date: 2/19/2026  Approved Dose/Quantity:   Reference #:     Insurance Company: [employer] Non-Specialty PA's - Phone [phone] Fax [phone]  Expected CoPay: $    CoPay Card Available:      Financial Assistance Needed:   Which Pharmacy is filling the prescription: [facility] - [place], [place] - [address]  Pharmacy Notified: YES  Patient Notified: **Instructed pharmacy to notify patient when script is ready to /ship.**

## 2025-02-19 NOTE — TELEPHONE ENCOUNTER
PA Initiation    Medication: TRULICITY 0.75 MG/0.5ML SC SOAJ  Insurance Company: [employer] Non-Specialty PA's - Phone [phone] Fax [phone]  Pharmacy Filling the Rx: [facility] - [place], [place] - [address]  Filling Pharmacy Phone: [phone]  Filling Pharmacy Fax: [phone]  Start Date: 2/19/2025

## 2025-03-14 ENCOUNTER — TRANSFERRED RECORDS (OUTPATIENT)
Dept: HEALTH INFORMATION MANAGEMENT | Facility: CLINIC | Age: 50
End: 2025-03-14
Payer: COMMERCIAL

## 2025-03-24 DIAGNOSIS — E11.65 TYPE 2 DIABETES MELLITUS WITH HYPERGLYCEMIA, WITHOUT LONG-TERM CURRENT USE OF INSULIN (H): ICD-10-CM

## 2025-03-24 DIAGNOSIS — E66.01 MORBID OBESITY (H): ICD-10-CM

## 2025-03-25 NOTE — TELEPHONE ENCOUNTER
Patient did call back and stated that she did request this refill and she is tolerating this dosage well. She wants to get this from [facility] pharmacy now. She needs the trulicity refilled.    New pharmacy attached for this refill.

## 2025-03-25 NOTE — TELEPHONE ENCOUNTER
A1c not at goal.  Does she have any updates on home blood sugar readings?  Any side effects?  Is she open to increasing the dose for the next month?  Damaris Son, DO

## 2025-03-25 NOTE — TELEPHONE ENCOUNTER
Called patient left message callback.  Please clarify which injectable GLP-1 she is taking there is a prescription active for Ozempic and this Trulicity prescription referenced here was sent in in February 2025 as well    Haris Gardner RN     [facility]

## 2025-03-27 NOTE — TELEPHONE ENCOUNTER
Type 2 diabetes mellitus with hyperglycemia, without long-term current use of insulin (H)  Morbid obesity (H)  - dulaglutide (TRULICITY) 1.5 MG/0.5ML pen; Inject 1.5 mg subcutaneously every 7 days.  Dispense: 6 mL; Refill: 0     Increase dose of Trulicity from 0.75 mg weekly to 1.5 mg weekly.    Damaris Son, DO

## 2025-03-27 NOTE — TELEPHONE ENCOUNTER
Patient reports her freestyle cookie reports an average BG reading of 182 within the last 7 days. She is having trouble pulling up exact readings. She reports NO side effects. She is willing to increase the dose. Please advise     Cristhian Fried RN

## 2025-03-28 ENCOUNTER — TRANSFERRED RECORDS (OUTPATIENT)
Dept: HEALTH INFORMATION MANAGEMENT | Facility: CLINIC | Age: 50
End: 2025-03-28
Payer: COMMERCIAL

## 2025-04-06 DIAGNOSIS — G25.81 RESTLESS LEGS SYNDROME (RLS): ICD-10-CM

## 2025-04-06 DIAGNOSIS — G47.00 INSOMNIA, UNSPECIFIED TYPE: ICD-10-CM

## 2025-04-06 DIAGNOSIS — Z79.899 CONTROLLED SUBSTANCE AGREEMENT SIGNED: ICD-10-CM

## 2025-04-07 RX ORDER — CLONAZEPAM 1 MG/1
TABLET ORAL
Qty: 30 TABLET | Refills: 0 | Status: SHIPPED | OUTPATIENT
Start: 2025-04-09

## 2025-04-07 RX ORDER — ZOLPIDEM TARTRATE 5 MG/1
TABLET ORAL
Qty: 30 TABLET | Refills: 0 | Status: SHIPPED | OUTPATIENT
Start: 2025-04-16

## 2025-04-07 NOTE — TELEPHONE ENCOUNTER
1. Controlled substance agreement signed - 8/15/24  2. Insomnia, unspecified type  3. Restless legs syndrome (RLS)  - zolpidem (AMBIEN) 5 MG tablet; Take 1 tablet by mouth every night at bedtime as needed for sleep.  Dispense: 30 tablet; Refill: 0  - clonazePAM (KLONOPIN) 1 MG tablet; Take 1 tablet by mouth every evening at bedtime.  Dispense: 30 tablet; Refill: 0    Reviewed MN . Coming due for refills. Prescriptions sent to pharmacy.    Coming due for med check visit. No appointment scheduled. Follow up order signed.  - PRIMARY CARE FOLLOW-UP SCHEDULING; Future     Damaris Son, DO

## 2025-05-04 DIAGNOSIS — G47.00 INSOMNIA, UNSPECIFIED TYPE: ICD-10-CM

## 2025-05-04 DIAGNOSIS — Z79.899 CONTROLLED SUBSTANCE AGREEMENT SIGNED: ICD-10-CM

## 2025-05-05 RX ORDER — ZOLPIDEM TARTRATE 5 MG/1
TABLET ORAL
Qty: 30 TABLET | Refills: 0 | Status: SHIPPED | OUTPATIENT
Start: 2025-05-11

## 2025-05-05 NOTE — TELEPHONE ENCOUNTER
1. Controlled substance agreement signed - 8/15/24  2. Insomnia, unspecified type  - zolpidem (AMBIEN) 5 MG tablet; Take 1 tablet by mouth every night at bedtime as needed for sleep.  Dispense: 30 tablet; Refill: 0     Reviewed MN . Coming due for refill. CSA up to date. Next med check scheduled. Refill sent to pharmacy.    Damaris Son, DO

## 2025-05-08 ENCOUNTER — TRANSFERRED RECORDS (OUTPATIENT)
Dept: HEALTH INFORMATION MANAGEMENT | Facility: CLINIC | Age: 50
End: 2025-05-08
Payer: COMMERCIAL

## 2025-05-10 ENCOUNTER — HEALTH MAINTENANCE LETTER (OUTPATIENT)
Age: 50
End: 2025-05-10

## 2025-05-11 DIAGNOSIS — G25.81 RESTLESS LEGS SYNDROME (RLS): ICD-10-CM

## 2025-05-11 DIAGNOSIS — G47.00 INSOMNIA, UNSPECIFIED TYPE: ICD-10-CM

## 2025-05-11 DIAGNOSIS — Z79.899 CONTROLLED SUBSTANCE AGREEMENT SIGNED: ICD-10-CM

## 2025-05-12 RX ORDER — CLONAZEPAM 1 MG/1
TABLET ORAL
Qty: 30 TABLET | Refills: 0 | Status: SHIPPED | OUTPATIENT
Start: 2025-05-12

## 2025-05-13 NOTE — TELEPHONE ENCOUNTER
1. Controlled substance agreement signed - 8/15/24  2. Insomnia, unspecified type  3. Restless legs syndrome (RLS)  - clonazePAM (KLONOPIN) 1 MG tablet; Take 1 tablet by mouth every evening at bedtime.  Dispense: 30 tablet; Refill: 0     Reviewed MN . Due for refill. CSA up to date. Refill sent to pharmacy.    Damaris Son DO

## 2025-05-31 DIAGNOSIS — G25.81 RESTLESS LEGS SYNDROME (RLS): ICD-10-CM

## 2025-06-02 RX ORDER — ROPINIROLE 0.5 MG/1
TABLET, FILM COATED ORAL
Qty: 180 TABLET | Refills: 2 | OUTPATIENT
Start: 2025-06-02

## 2025-06-08 DIAGNOSIS — Z79.899 CONTROLLED SUBSTANCE AGREEMENT SIGNED: ICD-10-CM

## 2025-06-08 DIAGNOSIS — G25.81 RESTLESS LEGS SYNDROME (RLS): ICD-10-CM

## 2025-06-08 DIAGNOSIS — G47.00 INSOMNIA, UNSPECIFIED TYPE: ICD-10-CM

## 2025-06-08 RX ORDER — CLONAZEPAM 1 MG/1
TABLET ORAL
Qty: 30 TABLET | Refills: 0 | Status: SHIPPED | OUTPATIENT
Start: 2025-06-13

## 2025-06-09 NOTE — TELEPHONE ENCOUNTER
1. Controlled substance agreement signed - 8/15/24  2. Insomnia, unspecified type  3. Restless legs syndrome (RLS)  - clonazePAM (KLONOPIN) 1 MG tablet; Take 1 tablet by mouth every evening at bedtime.  Dispense: 30 tablet; Refill: 0     Reviewed MN . Coming due for refill. CSA up to date. Next med check scheduled. Refill sent to pharmacy.    Damaris Son DO

## 2025-06-12 ENCOUNTER — OFFICE VISIT (OUTPATIENT)
Dept: FAMILY MEDICINE | Facility: CLINIC | Age: 50
End: 2025-06-12
Payer: COMMERCIAL

## 2025-06-12 ENCOUNTER — RESULTS FOLLOW-UP (OUTPATIENT)
Dept: FAMILY MEDICINE | Facility: CLINIC | Age: 50
End: 2025-06-12

## 2025-06-12 VITALS
DIASTOLIC BLOOD PRESSURE: 81 MMHG | OXYGEN SATURATION: 97 % | WEIGHT: 293 LBS | TEMPERATURE: 98.3 F | HEIGHT: 66 IN | BODY MASS INDEX: 47.09 KG/M2 | HEART RATE: 85 BPM | RESPIRATION RATE: 16 BRPM | SYSTOLIC BLOOD PRESSURE: 120 MMHG

## 2025-06-12 DIAGNOSIS — E03.8 SUBCLINICAL HYPOTHYROIDISM: ICD-10-CM

## 2025-06-12 DIAGNOSIS — L65.8 FEMALE PATTERN HAIR LOSS: ICD-10-CM

## 2025-06-12 DIAGNOSIS — I10 PRIMARY HYPERTENSION: ICD-10-CM

## 2025-06-12 DIAGNOSIS — E11.9 TYPE 2 DIABETES MELLITUS WITHOUT COMPLICATION, WITHOUT LONG-TERM CURRENT USE OF INSULIN (H): Primary | ICD-10-CM

## 2025-06-12 DIAGNOSIS — Z79.899 CONTROLLED SUBSTANCE AGREEMENT SIGNED: ICD-10-CM

## 2025-06-12 DIAGNOSIS — G25.81 RESTLESS LEGS SYNDROME (RLS): ICD-10-CM

## 2025-06-12 DIAGNOSIS — G43.909 MIGRAINE WITHOUT STATUS MIGRAINOSUS, NOT INTRACTABLE, UNSPECIFIED MIGRAINE TYPE: ICD-10-CM

## 2025-06-12 DIAGNOSIS — M79.7 FIBROMYALGIA: ICD-10-CM

## 2025-06-12 DIAGNOSIS — E66.01 MORBID OBESITY (H): ICD-10-CM

## 2025-06-12 DIAGNOSIS — G47.00 INSOMNIA, UNSPECIFIED TYPE: ICD-10-CM

## 2025-06-12 DIAGNOSIS — L68.0 HIRSUTISM: ICD-10-CM

## 2025-06-12 LAB
ANION GAP SERPL CALCULATED.3IONS-SCNC: 12 MMOL/L (ref 7–15)
BUN SERPL-MCNC: 14.9 MG/DL (ref 6–20)
CALCIUM SERPL-MCNC: 9.6 MG/DL (ref 8.8–10.4)
CHLORIDE SERPL-SCNC: 100 MMOL/L (ref 98–107)
CHOLEST SERPL-MCNC: 151 MG/DL
CREAT SERPL-MCNC: 1.19 MG/DL (ref 0.51–0.95)
EGFRCR SERPLBLD CKD-EPI 2021: 56 ML/MIN/1.73M2
EST. AVERAGE GLUCOSE BLD GHB EST-MCNC: 212 MG/DL
FASTING STATUS PATIENT QL REPORTED: YES
FASTING STATUS PATIENT QL REPORTED: YES
FSH SERPL IRP2-ACNC: 21.9 MIU/ML
GLUCOSE SERPL-MCNC: 256 MG/DL (ref 70–99)
HBA1C MFR BLD: 9 % (ref 0–5.6)
HCO3 SERPL-SCNC: 26 MMOL/L (ref 22–29)
HDLC SERPL-MCNC: 41 MG/DL
LAB ORDER RESULT STATUS: NORMAL
LDLC SERPL CALC-MCNC: 73 MG/DL
Lab: NORMAL
NONHDLC SERPL-MCNC: 110 MG/DL
PERFORMING LABORATORY: NORMAL
POTASSIUM SERPL-SCNC: 4.2 MMOL/L (ref 3.4–5.3)
PROLACTIN SERPL 3RD IS-MCNC: 17 NG/ML (ref 5–23)
SODIUM SERPL-SCNC: 138 MMOL/L (ref 135–145)
T4 FREE SERPL-MCNC: 0.97 NG/DL (ref 0.9–1.7)
TEST NAME: NORMAL
TRIGL SERPL-MCNC: 187 MG/DL
TSH SERPL DL<=0.005 MIU/L-ACNC: 4.6 UIU/ML (ref 0.3–4.2)

## 2025-06-12 RX ORDER — ZOLPIDEM TARTRATE 5 MG/1
TABLET ORAL
Qty: 30 TABLET | Refills: 0 | Status: SHIPPED | OUTPATIENT
Start: 2025-06-12

## 2025-06-12 RX ORDER — ROPINIROLE 0.5 MG/1
TABLET, FILM COATED ORAL
Qty: 180 TABLET | Refills: 3 | Status: SHIPPED | OUTPATIENT
Start: 2025-06-12

## 2025-06-12 RX ORDER — MINOXIDIL 2 %
SOLUTION, NON-ORAL TOPICAL 2 TIMES DAILY
Qty: 60 ML | Refills: 11 | Status: SHIPPED | OUTPATIENT
Start: 2025-06-12

## 2025-06-12 RX ORDER — BUPROPION HYDROCHLORIDE 300 MG/1
300 TABLET ORAL EVERY MORNING
Qty: 90 TABLET | Refills: 3 | Status: SHIPPED | OUTPATIENT
Start: 2025-06-12

## 2025-06-12 RX ORDER — PROPRANOLOL HYDROCHLORIDE 80 MG/1
80 CAPSULE, EXTENDED RELEASE ORAL DAILY
Qty: 90 CAPSULE | Refills: 3 | Status: SHIPPED | OUTPATIENT
Start: 2025-06-12

## 2025-06-12 RX ORDER — ROSUVASTATIN CALCIUM 10 MG/1
10 TABLET, COATED ORAL DAILY
Qty: 90 TABLET | Refills: 3 | Status: SHIPPED | OUTPATIENT
Start: 2025-06-12

## 2025-06-12 RX ORDER — SPIRONOLACTONE 100 MG/1
100 TABLET, FILM COATED ORAL DAILY
Qty: 90 TABLET | Refills: 3 | Status: SHIPPED | OUTPATIENT
Start: 2025-06-12

## 2025-06-12 RX ORDER — TIZANIDINE 2 MG/1
2 TABLET ORAL AT BEDTIME
Qty: 90 TABLET | Refills: 1 | Status: SHIPPED | OUTPATIENT
Start: 2025-06-12

## 2025-06-12 RX ORDER — HYDROCHLOROTHIAZIDE 25 MG/1
25 TABLET ORAL DAILY
Qty: 90 TABLET | Refills: 3 | Status: SHIPPED | OUTPATIENT
Start: 2025-06-12

## 2025-06-12 RX ORDER — POTASSIUM CHLORIDE 1.5 G/1.58G
20 POWDER, FOR SOLUTION ORAL DAILY
Qty: 100 PACKET | Refills: 3 | Status: SHIPPED | OUTPATIENT
Start: 2025-06-12

## 2025-06-12 ASSESSMENT — ANXIETY QUESTIONNAIRES
IF YOU CHECKED OFF ANY PROBLEMS ON THIS QUESTIONNAIRE, HOW DIFFICULT HAVE THESE PROBLEMS MADE IT FOR YOU TO DO YOUR WORK, TAKE CARE OF THINGS AT HOME, OR GET ALONG WITH OTHER PEOPLE: SOMEWHAT DIFFICULT
1. FEELING NERVOUS, ANXIOUS, OR ON EDGE: SEVERAL DAYS
3. WORRYING TOO MUCH ABOUT DIFFERENT THINGS: NOT AT ALL
6. BECOMING EASILY ANNOYED OR IRRITABLE: NOT AT ALL
GAD7 TOTAL SCORE: 3
7. FEELING AFRAID AS IF SOMETHING AWFUL MIGHT HAPPEN: NOT AT ALL
GAD7 TOTAL SCORE: 3
5. BEING SO RESTLESS THAT IT IS HARD TO SIT STILL: SEVERAL DAYS
2. NOT BEING ABLE TO STOP OR CONTROL WORRYING: NOT AT ALL

## 2025-06-12 ASSESSMENT — PATIENT HEALTH QUESTIONNAIRE - PHQ9
5. POOR APPETITE OR OVEREATING: SEVERAL DAYS
SUM OF ALL RESPONSES TO PHQ QUESTIONS 1-9: 7

## 2025-06-12 NOTE — PROGRESS NOTES
Assessment & Plan     1. Type 2 diabetes mellitus without complication, without long-term current use of insulin (H) (Primary)  - rosuvastatin (CRESTOR) 10 MG tablet; Take 1 tablet (10 mg) by mouth daily.  Dispense: 90 tablet; Refill: 3  - HEMOGLOBIN A1C  - Lipid panel reflex to direct LDL Non-fasting  - empagliflozin (JARDIANCE) 10 MG TABS tablet; Take 1 tablet (10 mg) by mouth daily.  Dispense: 30 tablet; Refill: 0  - Adult Diabetes Education  Referral; Future    Naheed presents today for follow-up diabetes.  She is not taking medications due to side effects of GLP-1.  A1c is uncontrolled.  Recommend she meet with diabetic educator for dietary counseling, review CGM data, adjustment in treatment regimen.  We will trial Jardiance 10 mg daily, can increase to 25 mg in 1 month if tolerating well.  If cost prohibitive or side effects, recommend we trial Januvia as I anticipate she would tolerate this better than the GLP-1.  We can also trial glipizide, though I am hesitant with comorbid morbid obesity.    2. Morbid obesity (H)  - buPROPion (WELLBUTRIN XL) 300 MG 24 hr tablet; Take 1 tablet (300 mg) by mouth every morning.  Dispense: 90 tablet; Refill: 3    Continuing Wellbutrin, did not tolerate GLP-1.  Keep working on healthy habits for weight management.    3. Controlled substance agreement signed - 8/15/24  4. Restless legs syndrome (RLS)  5. Insomnia, unspecified type  - zolpidem (AMBIEN) 5 MG tablet; Take 1 tablet by mouth every night at bedtime as needed for sleep.  Dispense: 30 tablet; Refill: 0  - rOPINIRole (REQUIP) 0.5 MG tablet; Take 1 tablet  (0.5mg) 2.5 hours before bedtime. Take second tablet (0.5mg) at bedtime.  Dispense: 180 tablet; Refill: 3    Continue Ambien, clonazepam, ropinirole.  Update medication controlled substance agreement next visit.    6. Primary hypertension  - hydrochlorothiazide (HYDRODIURIL) 25 MG tablet; Take 1 tablet (25 mg) by mouth daily.  Dispense: 90 tablet; Refill: 3  -  Basic metabolic panel  (Ca, Cl, CO2, Creat, Gluc, K, Na, BUN)  - potassium chloride (KLOR-CON) 20 MEQ packet; Take 20 mEq by mouth daily.  Dispense: 100 packet; Refill: 3    Blood pressure in range, continue hydrochlorothiazide and spironolactone.  We will continue potassium supplementation but change from tablet to packet formulation.    7. Subclinical hypothyroidism  - TSH with free T4 reflex    Continue monitoring TSH.    8. Migraine without status migrainosus, not intractable, unspecified migraine type  - propranolol ER (INDERAL LA) 80 MG 24 hr capsule; Take 1 capsule (80 mg) by mouth daily.  Dispense: 90 capsule; Refill: 3    Continue following with neurology, I am prescribing her propranolol, other prescriptions coming from neurology.    9. Female pattern hair loss  - spironolactone (ALDACTONE) 100 MG tablet; Take 1 tablet (100 mg) by mouth daily.  Dispense: 90 tablet; Refill: 3  - minoxidil (ROGAINE WOMENS) 2 % external solution; Apply topically 2 times daily.  Dispense: 60 mL; Refill: 11    Continue spironolactone and minoxidil, updating medication monitoring labs.    10. Hirsutism  - [facility]; [id number]; 17-Hydroxyprogesterone Quantitative by HPLC-MS/MS, Serum or Plasma (Laboratory Miscellaneous Order); Future  - Testosterone total  - Prolactin  - Follicle stimulating hormone  - [facility]; [id number]; 17-Hydroxyprogesterone Quantitative by HPLC-MS/MS, Serum or Plasma (Laboratory Miscellaneous Order)  - [id number]; 17-Hydroxyprogesterone Quantitative by HPLC-MS/MS, Serum or Plasma: [facility] Miscellaneous Test    Labs per other provider.    11. Fibromyalgia  - tiZANidine (ZANAFLEX) 2 MG tablet; Take 1 tablet (2 mg) by mouth at bedtime. Take 1/2 to 1 tablet by mouth every night at bedtime as needed for muscle spasms.  Dispense: 90 tablet; Refill: 1      Continues tizanidine as needed, she does not take this with the baclofen.    The longitudinal plan of care for the diagnosis(es)/condition(s) as  documented were addressed during this visit. Due to the added complexity in care, I will continue to support Naheed in the subsequent management and with ongoing continuity of care.       Subjective   Naheed is a 49 year old, presenting for the following health issues:  Diabetes      6/12/2025     8:37 AM   Additional Questions   Roomed by Rodri MARSHALL MA     History of Present Illness       Diabetes:   She presents for follow up of diabetes.   She is checking home blood glucose with a continuous glucose monitor.   She checks blood glucose before meals, after meals, before and after meals and at bedtime.  Blood glucose is sometimes over 200 and never under 70.  When her blood glucose is low, the patient is asymptomatic for confusion, blurred vision, lethargy and reports not feeling dizzy, shaky, or weak.  She is concerned about blood sugar frequently over 200.   She is having burning in feet.            She eats 2-3 servings of fruits and vegetables daily.She consumes 0 sweetened beverage(s) daily.She exercises with enough effort to increase her heart rate 9 or less minutes per day.  She exercises with enough effort to increase her heart rate 3 or less days per week.   She is taking medications regularly.        Type 2 diabetes mellitus without complication, without long-term current use of insulin (H) (Primary)  Hemoglobin A1C   Date Value Ref Range Status   06/12/2025 9.0 (H) 0.0 - 5.6 % Final     Comment:     Normal <5.7%   Prediabetes 5.7-6.4%    Diabetes 6.5% or higher     Note: Adopted from ADA consensus guidelines.   08/25/2009 5.7 4.3 - 6.0 % Final     Currently using CGM.  On Trulicity 1.5 mg weekly - not taking due to side effects.   Statin: Crestor 10         The 10-year ASCVD risk score (Eleonora ZAMORA, et al., 2019) is: 3%    Values used to calculate the score:      Age: 49 years      Sex: Female      Is Non- : No      Diabetic: Yes      Tobacco smoker: No      Systolic Blood Pressure: 120  mmHg      Is BP treated: Yes      HDL Cholesterol: 57 mg/dL      Total Cholesterol: 245 mg/dL     Lab Results   Component Value Date    CHOL 245 08/15/2024    CHOL 204 04/29/2016     Lab Results   Component Value Date    HDL 57 08/15/2024    HDL 41 04/29/2016     Lab Results   Component Value Date     08/15/2024     04/29/2016     Lab Results   Component Value Date    TRIG 189 08/15/2024    TRIG 120 04/29/2016       Morbid obesity (H)  Body mass index is 50.84 kg/m .  Wt Readings from Last 4 Encounters:   06/12/25 (!) 142.9 kg (315 lb)   02/07/25 (!) 144.7 kg (319 lb)   02/04/25 (!) 146.5 kg (323 lb)   09/13/24 147.3 kg (324 lb 12.8 oz)     Didn't tolerate GLP-1s.        Controlled substance agreement signed - 8/15/24  Restless legs syndrome (RLS)  Insomnia, unspecified type  Ambien, clonazepam, lyrica         8/9/2023     9:47 AM 3/5/2024     4:52 PM 8/15/2024    11:43 AM   PHQ   PHQ-9 Total Score 8 14 14   Q9: Thoughts of better off dead/self-harm past 2 weeks Not at all  Not at all Not at all       Proxy-reported         4/11/2023    12:30 PM 8/9/2023     9:47 AM 3/5/2024     4:52 PM   CHANCE-7 SCORE   Total Score 3 (minimal anxiety) 4 (minimal anxiety) 4 (minimal anxiety)   Total Score 3 4 4     Last UDS: August 2024    Primary hypertension  BP Readings from Last 6 Encounters:   06/12/25 120/81   02/07/25 120/80   02/04/25 127/76   09/13/24 123/71   08/15/24 102/85   07/25/24 125/75     Last Comprehensive Metabolic Panel:  Lab Results   Component Value Date     02/04/2025    POTASSIUM 4.4 02/04/2025    CHLORIDE 101 02/04/2025    CO2 25 02/04/2025    ANIONGAP 13 02/04/2025     (H) 02/04/2025    BUN 16.4 02/04/2025    CR 1.16 (H) 02/04/2025    GFRESTIMATED 58 (L) 02/04/2025    MABEL 9.7 02/04/2025     Hydrochlorothiazide 25 mg daily, propranolol 80 mg extended release daily, spironolactone 100 mg daily.      Subclinical hypothyroidism  Lab Results   Component Value Date    TSH 3.98 08/15/2024  "   TSH 4.57 08/25/2009       Migraine without status migrainosus, not intractable, unspecified migraine type  Propranolol 80 mg daily.  Working with neurology.  Ubrelvy prescription, nortriptyline, Maxalt PRN      Female pattern hair loss  Spironolactone, minoxidil.      Review of Systems  See HPI above.         Objective    /81   Pulse 85   Temp 98.3  F (36.8  C) (Oral)   Resp 16   Ht 1.676 m (5' 6\")   Wt (!) 142.9 kg (315 lb)   SpO2 97%   BMI 50.84 kg/m    Body mass index is 50.84 kg/m .  Physical Exam     GENERAL: alert and no distress  NECK: no adenopathy, no asymmetry, masses, or scars  RESP: lungs clear to auscultation - no rales, rhonchi or wheezes  CV: regular rate and rhythm, normal S1 S2, no S3 or S4, no murmur, click or rub, no peripheral edema  ABDOMEN: soft, nontender, no hepatosplenomegaly, no masses and bowel sounds normal  MS: no gross musculoskeletal defects noted, no edema          Signed Electronically by: Damaris Son, DO    "

## 2025-06-13 ENCOUNTER — RESULTS FOLLOW-UP (OUTPATIENT)
Dept: OBGYN | Facility: CLINIC | Age: 50
End: 2025-06-13

## 2025-06-13 DIAGNOSIS — M79.7 FIBROMYALGIA: ICD-10-CM

## 2025-06-14 LAB
MISCELLANEOUS TEST 1 (ARUP): NORMAL
TESTOST SERPL-MCNC: 10 NG/DL (ref 8–60)

## 2025-06-14 NOTE — TELEPHONE ENCOUNTER
I am aware of her baclofen from another provider, but we do not have robaxin on her list. Who is prescribing this?  Damaris Son, DO

## 2025-06-16 ENCOUNTER — PATIENT OUTREACH (OUTPATIENT)
Dept: CARE COORDINATION | Facility: CLINIC | Age: 50
End: 2025-06-16
Payer: COMMERCIAL

## 2025-06-17 ENCOUNTER — TELEPHONE (OUTPATIENT)
Dept: FAMILY MEDICINE | Facility: CLINIC | Age: 50
End: 2025-06-17
Payer: COMMERCIAL

## 2025-06-17 NOTE — TELEPHONE ENCOUNTER
LMTCB informing pt we had a question regarding her medications, if pt calls back see message from Dr. Son:         Damaris Son, DO to [facility]      6/17/25  2:18 PM  Please connect with patient to clarify baclofen and if she is continuing to take this?

## 2025-06-17 NOTE — TELEPHONE ENCOUNTER
LMTCB informing patient we had a question regarding her medications, if she calls back please see message from provider below

## 2025-06-19 ENCOUNTER — MYC MEDICAL ADVICE (OUTPATIENT)
Dept: FAMILY MEDICINE | Facility: CLINIC | Age: 50
End: 2025-06-19
Payer: COMMERCIAL

## 2025-06-19 NOTE — TELEPHONE ENCOUNTER
Attempt 2: Sent [employer] message to pt asking if she is still taking Baclofen and if she happens to know who prescribed Robaxin to her as Dr. Son was unaware

## 2025-06-19 NOTE — TELEPHONE ENCOUNTER
Please let the [facility] pharmacy know that we will not be refilling this prescription.  Damaris Son, DO

## 2025-06-19 NOTE — TELEPHONE ENCOUNTER
Pharmacy called in regards to this and would also like to clarify - please advise and call [facility]

## 2025-06-20 RX ORDER — TIZANIDINE 2 MG/1
TABLET ORAL
Qty: 90 TABLET | Refills: 1 | OUTPATIENT
Start: 2025-06-20

## 2025-06-26 ENCOUNTER — TELEPHONE (OUTPATIENT)
Dept: FAMILY MEDICINE | Facility: CLINIC | Age: 50
End: 2025-06-26
Payer: COMMERCIAL

## 2025-06-26 NOTE — TELEPHONE ENCOUNTER
We have discussed this in detail. Patient is taking 2 other muscle relaxants and her preference is to continue Baclofen, so I declined the prescription. They can cancel the tizanidine order.  Damaris Son, DO

## 2025-06-26 NOTE — TELEPHONE ENCOUNTER
Medication Question or Refill        What medication are you calling about (include dose and sig)?:   tiZANidine (ZANAFLEX) 2 MG tablet 90 tablet 1 6/12/2025 -- No   Sig - Route: Take 1 tablet (2 mg) by mouth at bedtime. Take 1/2 to 1 tablet by mouth every night at bedtime as needed for muscle spasms. - Oral     Do you have any questions or concerns?  Pharmacy is asking for clarification on directions for this medication.    Preferred Pharmacy:  [facility] - [place], [place] - [address]  [address]  [facility] [address]  Phone: [phone] Fax: [phone]      Controlled Substance Agreement on file:   CSA -- Patient Level:    CSA: None found at the patient level.

## 2025-07-06 DIAGNOSIS — Z79.899 CONTROLLED SUBSTANCE AGREEMENT SIGNED: ICD-10-CM

## 2025-07-06 DIAGNOSIS — G25.81 RESTLESS LEGS SYNDROME (RLS): ICD-10-CM

## 2025-07-06 DIAGNOSIS — G47.00 INSOMNIA, UNSPECIFIED TYPE: ICD-10-CM

## 2025-07-07 RX ORDER — CLONAZEPAM 1 MG/1
TABLET ORAL
Qty: 30 TABLET | Refills: 0 | Status: SHIPPED | OUTPATIENT
Start: 2025-07-11

## 2025-07-07 NOTE — TELEPHONE ENCOUNTER
1. Controlled substance agreement signed - 8/15/24  2. Insomnia, unspecified type  3. Restless legs syndrome (RLS)  - clonazePAM (KLONOPIN) 1 MG tablet; Take 1 tablet by mouth every evening at bedtime.  Dispense: 30 tablet; Refill: 0     Reviewed [place] . Coming due for refill. CSA up todate. Recent med check appointment. Next appointment scheduled. Refill sent to pharmacy.    Damaris Son, DO

## 2025-07-08 ENCOUNTER — TRANSFERRED RECORDS (OUTPATIENT)
Dept: HEALTH INFORMATION MANAGEMENT | Facility: CLINIC | Age: 50
End: 2025-07-08
Payer: COMMERCIAL

## 2025-07-19 DIAGNOSIS — Z79.899 CONTROLLED SUBSTANCE AGREEMENT SIGNED: ICD-10-CM

## 2025-07-19 DIAGNOSIS — G47.00 INSOMNIA, UNSPECIFIED TYPE: ICD-10-CM

## 2025-07-21 RX ORDER — ZOLPIDEM TARTRATE 5 MG/1
5 TABLET ORAL
Qty: 30 TABLET | Refills: 0 | Status: SHIPPED | OUTPATIENT
Start: 2025-07-21

## 2025-07-21 NOTE — TELEPHONE ENCOUNTER
No, we will bridge until upcoming appt. She should request refills when needed until that appt.    Jade Sanchez PA-C

## 2025-07-21 NOTE — TELEPHONE ENCOUNTER
Will give 30 day supply in PCP absence. Per last note will be due for updated CSA at next visit.    Jade Sanchez PA-C

## 2025-07-21 NOTE — TELEPHONE ENCOUNTER
1st attempt  spoke with pt noted she has an appt scheduled  for 09/18/2025 for a CSA and med check appt. The refill allowed is only a 30 day supply which would not get her to the time of her appt.  In speaking with the pt she stated the LAST fill of her medication was only a 15 day supply as well so that is why she is needing a refill a bit soon. Pt does have a CSA agreement signed from 08/15/2024  I relayed to pt that would reach out to the provider / care team to confirm the signed agreement and if she does need to come in sooner than the 09/18/2025 appt.  In order to not delay another refill before she comes for her Sept appt.

## 2025-07-21 NOTE — TELEPHONE ENCOUNTER
ANTICOAGULATION FOLLOW-UP CLINIC VISIT    Patient Name:  Damari Bryant  Date:  2/27/2017  Contact Type:  Telephone/ with [facility], patients     SUBJECTIVE:     Patient Findings     Positives Antibiotic use or infection    Comments Sinus infection started ceftin on 2/23/17 for 10 days (3/5/17).             OBJECTIVE    INR   Date Value Ref Range Status   02/27/2017 3.8  Final       ASSESSMENT / PLAN  INR assessment SUPRA    Recheck INR In: 3 DAYS    INR Location Clinic      Anticoagulation Summary as of 2/27/2017     INR goal 2.0-3.0   Today's INR 3.8!   Maintenance plan 10 mg (5 mg x 2) on Mon, Thu; 5 mg (5 mg x 1) all other days   Full instructions 2/27: 2.5 mg; 2/28: 2.5 mg; Otherwise 10 mg on Mon, Thu; 5 mg all other days   Weekly total 45 mg   Plan last modified Mary Berry RN (2/17/2017)   Next INR check 3/2/2017   Priority INR   Target end date 8/14/2017    Indications   Deep vein thrombosis (DVT) (H) [I82.409] [I82.409]  Long-term (current) use of anticoagulants [Z79.01] [Z79.01]         Anticoagulation Episode Summary     INR check location     Preferred lab [facility]    Send INR reminders to [facility] INR    Comments Pateint has chemo infusion pump for 48 hours every 2 weeks.  Her INR rises during this infusion. Her next scheduled infusion is scheduled 2/21/17 and 3/7/17. Call [employer] cell phone for INR results [phone]  Per Esther Greene MD- Goal of 2-3.      Anticoagulation Care Providers     Provider Role Specialty Phone number    Esther Greene MD Responsible Hematology [phone]            See the Encounter Report to view Anticoagulation Flowsheet and Dosing Calendar (Go to Encounters tab in chart review, and find the Anticoagulation Therapy Visit)    Sinus infection started ceftin on 2/23/17 for 10 days (3/5/17).      Mary Berry RN                Closed encounter  pt does not need to come in sooner than her already scheduled appt.   per Jade Augustine, we will bridge until upcoming appt. She should request refills when needed until that appt.  Closed encounter/

## 2025-08-01 ENCOUNTER — TRANSFERRED RECORDS (OUTPATIENT)
Dept: HEALTH INFORMATION MANAGEMENT | Facility: CLINIC | Age: 50
End: 2025-08-01
Payer: COMMERCIAL

## 2025-08-16 DIAGNOSIS — Z79.899 CONTROLLED SUBSTANCE AGREEMENT SIGNED: ICD-10-CM

## 2025-08-16 DIAGNOSIS — G25.81 RESTLESS LEGS SYNDROME (RLS): ICD-10-CM

## 2025-08-16 DIAGNOSIS — G47.00 INSOMNIA, UNSPECIFIED TYPE: ICD-10-CM

## 2025-08-17 RX ORDER — ZOLPIDEM TARTRATE 5 MG/1
5 TABLET ORAL
Qty: 30 TABLET | Refills: 0 | Status: SHIPPED | OUTPATIENT
Start: 2025-08-22

## 2025-08-17 RX ORDER — CLONAZEPAM 1 MG/1
TABLET ORAL
Qty: 30 TABLET | Refills: 0 | Status: SHIPPED | OUTPATIENT
Start: 2025-08-17

## 2025-08-25 DIAGNOSIS — E11.65 TYPE 2 DIABETES MELLITUS WITH HYPERGLYCEMIA, WITHOUT LONG-TERM CURRENT USE OF INSULIN (H): ICD-10-CM

## 2025-08-25 RX ORDER — HYDROCHLOROTHIAZIDE 12.5 MG/1
CAPSULE ORAL
Qty: 6 EACH | Refills: 2 | Status: SHIPPED | OUTPATIENT
Start: 2025-08-25

## (undated) DEVICE — BUR ARTHREX COOLCUT BONE CUTTER 4.0MMX13CM AR-8400BC

## (undated) DEVICE — POSITIONER SCHLEIN KIT BC-1000X

## (undated) DEVICE — BUR ARTHREX COOLCUT OVAL FLUSHCUT 6 FLUTE 5.0MM AR-8500FOS

## (undated) DEVICE — Device

## (undated) DEVICE — DRSG GAUZE 4X4" 3033

## (undated) DEVICE — PLATE GROUNDING ADULT W/CORD 9165L

## (undated) DEVICE — MAT FLOOR SURGICAL 40X38 0702140238

## (undated) DEVICE — SOLUTION IRRIG 2B7127 .9NS 3000ML BAG

## (undated) DEVICE — TAPE ADH POROUS 3IN CURITY STD 7046C

## (undated) DEVICE — TUBING SUCTION MEDI-VAC 1/4"X20' N620A

## (undated) DEVICE — BLADE KNIFE SURG 11 371111

## (undated) DEVICE — COUNTER NEEDLE ADH & FOAM 20CT 9106

## (undated) DEVICE — DRSG XEROFORM 1X8"

## (undated) DEVICE — TUBING SUCTION MEDI-VAC 1/4"X20' N620A - HE

## (undated) DEVICE — HOLDER LIMB VELCRO OR 0814-1533

## (undated) DEVICE — KIT SHOULDER POSITIONING BEACH CHAIR ARM HOLDER AR-1644

## (undated) DEVICE — TUBING SYSTEM ARTHREX PATIENT REDEUCE AR-6421

## (undated) DEVICE — SYR 50ML LL W/O NDL 309653

## (undated) DEVICE — SYR 30ML LL W/O NDL 302832

## (undated) DEVICE — PREP CHLORAPREP W/ORANGE TINT 10.5ML 930715

## (undated) DEVICE — DRAPE STERI TOWEL LG 1010

## (undated) DEVICE — DRSG ABD TNDRSRB WET PRUF 8IN X 10IN STRL  9194A

## (undated) DEVICE — GLOVE BIOGEL PI ORTHOPRO SZ 8.5 47685

## (undated) DEVICE — SU ETHILON 3-0 PS-1 18" 1663G

## (undated) DEVICE — TUBING SYSTEM ARTHREX PUMP REDEUCE AR-6411

## (undated) DEVICE — GLOVE UNDER INDICATOR PI SZ 8.5 LF 41685

## (undated) DEVICE — GOWN IMPERVIOUS BREATHABLE SMART LG 89015

## (undated) DEVICE — NEEDLE SPINAL DISP 18X3 QUINCKE BD 5174

## (undated) DEVICE — GOWN IMPERVIOUS BREATHABLE 2XL/XLONG

## (undated) DEVICE — ABLATOR ARTHREX APOLLO RF MP90 ASPIRATING 90DEG AR-9811

## (undated) DEVICE — GLOVE UNDER INDICATOR PI SZ 6.5 LF 41665

## (undated) DEVICE — DRAPE SHEET SHOULDER ARTHROSCOPY 89070

## (undated) DEVICE — SOL WATER IRRIG 1000ML BOTTLE 2F7114

## (undated) DEVICE — SUCTION MANIFOLD NEPTUNE 2 SYS 4 PORT 0702-020-000

## (undated) DEVICE — GLOVE BIOGEL PI SZ 6.5 40865

## (undated) RX ORDER — GLYCOPYRROLATE 0.2 MG/ML
INJECTION INTRAMUSCULAR; INTRAVENOUS
Status: DISPENSED
Start: 2022-10-06

## (undated) RX ORDER — LIDOCAINE HYDROCHLORIDE 10 MG/ML
INJECTION, SOLUTION EPIDURAL; INFILTRATION; INTRACAUDAL; PERINEURAL
Status: DISPENSED
Start: 2022-10-06

## (undated) RX ORDER — FENTANYL CITRATE 50 UG/ML
INJECTION, SOLUTION INTRAMUSCULAR; INTRAVENOUS
Status: DISPENSED
Start: 2022-10-06

## (undated) RX ORDER — DEXMEDETOMIDINE HYDROCHLORIDE 4 UG/ML
INJECTION, SOLUTION INTRAVENOUS
Status: DISPENSED
Start: 2022-10-06

## (undated) RX ORDER — NEOSTIGMINE METHYLSULFATE 1 MG/ML
VIAL (ML) INJECTION
Status: DISPENSED
Start: 2022-10-06

## (undated) RX ORDER — PROPOFOL 10 MG/ML
INJECTION, EMULSION INTRAVENOUS
Status: DISPENSED
Start: 2022-10-06

## (undated) RX ORDER — DEXAMETHASONE SODIUM PHOSPHATE 10 MG/ML
INJECTION, EMULSION INTRAMUSCULAR; INTRAVENOUS
Status: DISPENSED
Start: 2022-10-06